# Patient Record
Sex: MALE | Race: WHITE | NOT HISPANIC OR LATINO | Employment: FULL TIME | URBAN - METROPOLITAN AREA
[De-identification: names, ages, dates, MRNs, and addresses within clinical notes are randomized per-mention and may not be internally consistent; named-entity substitution may affect disease eponyms.]

---

## 2017-01-16 ENCOUNTER — GENERIC CONVERSION - ENCOUNTER (OUTPATIENT)
Dept: OTHER | Facility: OTHER | Age: 52
End: 2017-01-16

## 2017-02-02 ENCOUNTER — GENERIC CONVERSION - ENCOUNTER (OUTPATIENT)
Dept: OTHER | Facility: OTHER | Age: 52
End: 2017-02-02

## 2017-02-21 ENCOUNTER — TRANSCRIBE ORDERS (OUTPATIENT)
Dept: ADMINISTRATIVE | Facility: HOSPITAL | Age: 52
End: 2017-02-21

## 2017-02-21 ENCOUNTER — HOSPITAL ENCOUNTER (OUTPATIENT)
Dept: NON INVASIVE DIAGNOSTICS | Facility: HOSPITAL | Age: 52
Discharge: HOME | End: 2017-02-21
Payer: COMMERCIAL

## 2017-02-21 ENCOUNTER — APPOINTMENT (OUTPATIENT)
Dept: LAB | Facility: HOSPITAL | Age: 52
End: 2017-02-21
Payer: COMMERCIAL

## 2017-02-21 DIAGNOSIS — M17.12 PRIMARY OSTEOARTHRITIS OF LEFT KNEE: ICD-10-CM

## 2017-02-21 DIAGNOSIS — M25.561 RIGHT KNEE PAIN, UNSPECIFIED CHRONICITY: ICD-10-CM

## 2017-02-21 DIAGNOSIS — S83.232A COMPLEX TEAR OF MEDIAL MENISCUS OF LEFT KNEE AS CURRENT INJURY, INITIAL ENCOUNTER: Primary | ICD-10-CM

## 2017-02-21 DIAGNOSIS — S83.232A COMPLEX TEAR OF MEDIAL MENISCUS OF LEFT KNEE AS CURRENT INJURY, INITIAL ENCOUNTER: ICD-10-CM

## 2017-02-21 LAB
ANION GAP SERPL CALCULATED.3IONS-SCNC: 11 MMOL/L (ref 4–13)
ATRIAL RATE: 70 BPM
BASOPHILS # BLD AUTO: 0.08 THOUSANDS/ΜL (ref 0–0.1)
BASOPHILS NFR BLD AUTO: 1 % (ref 0–1)
BUN SERPL-MCNC: 17 MG/DL (ref 5–25)
CALCIUM SERPL-MCNC: 9.7 MG/DL (ref 8.3–10.1)
CHLORIDE SERPL-SCNC: 102 MMOL/L (ref 100–108)
CO2 SERPL-SCNC: 27 MMOL/L (ref 21–32)
CREAT SERPL-MCNC: 0.92 MG/DL (ref 0.6–1.3)
EOSINOPHIL # BLD AUTO: 0.26 THOUSAND/ΜL (ref 0–0.61)
EOSINOPHIL NFR BLD AUTO: 3 % (ref 0–6)
ERYTHROCYTE [DISTWIDTH] IN BLOOD BY AUTOMATED COUNT: 13.9 % (ref 11.6–15.1)
GFR SERPL CREATININE-BSD FRML MDRD: >60 ML/MIN/1.73SQ M
GLUCOSE SERPL-MCNC: 113 MG/DL (ref 65–140)
HCT VFR BLD AUTO: 48.8 % (ref 36.5–49.3)
HGB BLD-MCNC: 16.7 G/DL (ref 12–17)
LYMPHOCYTES # BLD AUTO: 1.64 THOUSANDS/ΜL (ref 0.6–4.47)
LYMPHOCYTES NFR BLD AUTO: 21 % (ref 14–44)
MCH RBC QN AUTO: 31.3 PG (ref 26.8–34.3)
MCHC RBC AUTO-ENTMCNC: 34.2 G/DL (ref 31.4–37.4)
MCV RBC AUTO: 92 FL (ref 82–98)
MONOCYTES # BLD AUTO: 0.91 THOUSAND/ΜL (ref 0.17–1.22)
MONOCYTES NFR BLD AUTO: 12 % (ref 4–12)
NEUTROPHILS # BLD AUTO: 4.93 THOUSANDS/ΜL (ref 1.85–7.62)
NEUTS SEG NFR BLD AUTO: 63 % (ref 43–75)
P AXIS: 39 DEGREES
PLATELET # BLD AUTO: 233 THOUSANDS/UL (ref 149–390)
PMV BLD AUTO: 11.4 FL (ref 8.9–12.7)
POTASSIUM SERPL-SCNC: 4.5 MMOL/L (ref 3.5–5.3)
PR INTERVAL: 170 MS
QRS AXIS: 6 DEGREES
QRSD INTERVAL: 80 MS
QT INTERVAL: 394 MS
QTC INTERVAL: 425 MS
RBC # BLD AUTO: 5.33 MILLION/UL (ref 3.88–5.62)
SODIUM SERPL-SCNC: 140 MMOL/L (ref 136–145)
T WAVE AXIS: -7 DEGREES
VENTRICULAR RATE: 70 BPM
WBC # BLD AUTO: 7.82 THOUSAND/UL (ref 4.31–10.16)

## 2017-02-21 PROCEDURE — 80048 BASIC METABOLIC PNL TOTAL CA: CPT

## 2017-02-21 PROCEDURE — 93005 ELECTROCARDIOGRAM TRACING: CPT

## 2017-02-21 PROCEDURE — 85025 COMPLETE CBC W/AUTO DIFF WBC: CPT

## 2017-02-21 PROCEDURE — 36415 COLL VENOUS BLD VENIPUNCTURE: CPT

## 2017-02-28 ENCOUNTER — ALLSCRIPTS OFFICE VISIT (OUTPATIENT)
Dept: OTHER | Facility: OTHER | Age: 52
End: 2017-02-28

## 2017-03-09 ENCOUNTER — GENERIC CONVERSION - ENCOUNTER (OUTPATIENT)
Dept: OTHER | Facility: OTHER | Age: 52
End: 2017-03-09

## 2017-04-18 ENCOUNTER — GENERIC CONVERSION - ENCOUNTER (OUTPATIENT)
Dept: OTHER | Facility: OTHER | Age: 52
End: 2017-04-18

## 2017-05-01 ENCOUNTER — GENERIC CONVERSION - ENCOUNTER (OUTPATIENT)
Dept: OTHER | Facility: OTHER | Age: 52
End: 2017-05-01

## 2017-07-01 ENCOUNTER — OFFICE VISIT (OUTPATIENT)
Dept: URGENT CARE | Facility: CLINIC | Age: 52
End: 2017-07-01
Payer: COMMERCIAL

## 2017-07-01 PROCEDURE — 99213 OFFICE O/P EST LOW 20 MIN: CPT

## 2017-07-07 ENCOUNTER — ALLSCRIPTS OFFICE VISIT (OUTPATIENT)
Dept: OTHER | Facility: OTHER | Age: 52
End: 2017-07-07

## 2017-09-13 ENCOUNTER — GENERIC CONVERSION - ENCOUNTER (OUTPATIENT)
Dept: OTHER | Facility: OTHER | Age: 52
End: 2017-09-13

## 2017-09-14 ENCOUNTER — GENERIC CONVERSION - ENCOUNTER (OUTPATIENT)
Dept: OTHER | Facility: OTHER | Age: 52
End: 2017-09-14

## 2017-10-09 ENCOUNTER — APPOINTMENT (OUTPATIENT)
Dept: PHYSICAL THERAPY | Facility: CLINIC | Age: 52
End: 2017-10-09
Payer: COMMERCIAL

## 2017-10-09 PROCEDURE — G8990 OTHER PT/OT CURRENT STATUS: HCPCS

## 2017-10-09 PROCEDURE — 97162 PT EVAL MOD COMPLEX 30 MIN: CPT

## 2017-10-09 PROCEDURE — G8991 OTHER PT/OT GOAL STATUS: HCPCS

## 2017-10-09 PROCEDURE — 97140 MANUAL THERAPY 1/> REGIONS: CPT

## 2017-10-09 PROCEDURE — 97010 HOT OR COLD PACKS THERAPY: CPT

## 2017-10-09 PROCEDURE — 97110 THERAPEUTIC EXERCISES: CPT

## 2017-10-11 ENCOUNTER — APPOINTMENT (OUTPATIENT)
Dept: PHYSICAL THERAPY | Facility: CLINIC | Age: 52
End: 2017-10-11
Payer: COMMERCIAL

## 2017-10-11 PROCEDURE — 97140 MANUAL THERAPY 1/> REGIONS: CPT

## 2017-10-11 PROCEDURE — 97110 THERAPEUTIC EXERCISES: CPT

## 2017-10-12 ENCOUNTER — APPOINTMENT (OUTPATIENT)
Dept: PHYSICAL THERAPY | Facility: CLINIC | Age: 52
End: 2017-10-12
Payer: COMMERCIAL

## 2017-10-12 PROCEDURE — 97140 MANUAL THERAPY 1/> REGIONS: CPT

## 2017-10-12 PROCEDURE — 97110 THERAPEUTIC EXERCISES: CPT

## 2017-10-13 ENCOUNTER — APPOINTMENT (OUTPATIENT)
Dept: PHYSICAL THERAPY | Facility: CLINIC | Age: 52
End: 2017-10-13
Payer: COMMERCIAL

## 2017-10-16 ENCOUNTER — APPOINTMENT (OUTPATIENT)
Dept: PHYSICAL THERAPY | Facility: CLINIC | Age: 52
End: 2017-10-16
Payer: COMMERCIAL

## 2017-10-16 PROCEDURE — 97110 THERAPEUTIC EXERCISES: CPT

## 2017-10-16 PROCEDURE — 97140 MANUAL THERAPY 1/> REGIONS: CPT

## 2017-10-18 ENCOUNTER — APPOINTMENT (OUTPATIENT)
Dept: PHYSICAL THERAPY | Facility: CLINIC | Age: 52
End: 2017-10-18
Payer: COMMERCIAL

## 2017-10-18 PROCEDURE — 97140 MANUAL THERAPY 1/> REGIONS: CPT

## 2017-10-18 PROCEDURE — 97110 THERAPEUTIC EXERCISES: CPT

## 2017-10-20 ENCOUNTER — APPOINTMENT (OUTPATIENT)
Dept: PHYSICAL THERAPY | Facility: CLINIC | Age: 52
End: 2017-10-20
Payer: COMMERCIAL

## 2017-10-20 PROCEDURE — 97110 THERAPEUTIC EXERCISES: CPT

## 2017-10-30 ENCOUNTER — APPOINTMENT (OUTPATIENT)
Dept: PHYSICAL THERAPY | Facility: CLINIC | Age: 52
End: 2017-10-30
Payer: COMMERCIAL

## 2017-10-30 PROCEDURE — 97140 MANUAL THERAPY 1/> REGIONS: CPT

## 2017-10-30 PROCEDURE — 97110 THERAPEUTIC EXERCISES: CPT

## 2017-11-01 ENCOUNTER — APPOINTMENT (OUTPATIENT)
Dept: PHYSICAL THERAPY | Facility: CLINIC | Age: 52
End: 2017-11-01
Payer: COMMERCIAL

## 2017-11-03 ENCOUNTER — GENERIC CONVERSION - ENCOUNTER (OUTPATIENT)
Dept: OTHER | Facility: OTHER | Age: 52
End: 2017-11-03

## 2017-11-03 ENCOUNTER — APPOINTMENT (OUTPATIENT)
Dept: PHYSICAL THERAPY | Facility: CLINIC | Age: 52
End: 2017-11-03
Payer: COMMERCIAL

## 2017-11-03 PROCEDURE — 97110 THERAPEUTIC EXERCISES: CPT

## 2017-11-22 ENCOUNTER — ALLSCRIPTS OFFICE VISIT (OUTPATIENT)
Dept: OTHER | Facility: OTHER | Age: 52
End: 2017-11-22

## 2017-11-25 NOTE — PROGRESS NOTES
Assessment    1  Acute upper respiratory infection (465 9) (J06 9)   2  Headache (784 0) (R51)    Plan  Acute upper respiratory infection    · Amoxicillin 500 MG Oral Capsule; TAKE 1 CAPSULE 3 TIMES DAILY  Colon cancer screening    · COLONOSCOPY; Status:Temporary Deferral - Pt requests deferral;    11/22/2018  Headache    · SUMAtriptan Succinate 50 MG Oral Tablet; TAKE 1 TABLET FOR MIGRAINERELIEF  MAY REPEAT EVERY 2 HOURS  MAX 200MG/DAY  Increased BMI    · We recommend that you bring your body mass index down to 26 ; Status:Complete;  Done: [id number]  Need for immunization against influenza    · Fluzone Quadrivalent Intramuscular Suspension    Discussion/Summary  The patient was counseled regarding instructions for management,-- risk factor reductions,-- prognosis,-- patient and family education,-- risks and benefits of treatment options,-- importance of compliance with treatment  Possible side effects of new medications were reviewed with the patient/guardian today  The treatment plan was reviewed with the patient/guardian  The patient/guardian understands and agrees with the treatment plan      Chief Complaint    1  Cold Symptoms  Luann Rashid is here today with cold symptoms x 1 week  He also has noticed an increase in his migraine headaches  He used to take OTC Excedrin migraine, but it doesn't offer him the same relief as it used to  He is asking for an Rx for sumatriptan  History of Present Illness  HPI: See chief compliant  Review of Systems   Constitutional: no fever-- and-- no chills  ENT: sore throat,-- nasal discharge-- and-- hoarseness, but-- no earache  Cardiovascular: no chest pain-- and-- no palpitations  Respiratory: cough-- and-- PND  Gastrointestinal: no abdominal pain,-- no constipation-- and-- no diarrhea  Neurological: headache, but-- as noted in HPI  ROS reviewed  Active Problems  1  Colon cancer screening (V76 51) (Z12 11)   2  Hyperlipidemia (272 4) (E78 5)   3   Increased BMI (783 9) (R63 8)   4  Need for immunization against influenza (V04 81) (Z23)    Past Medical History    1  History of Abdominal pain, epigastric (789 06) (R10 13)   2  History of Abdominal pain, left upper quadrant (789 02) (R10 12)   3  History of Bee allergy status (V15 06) (Z91 030)   4  History of Bee Sting (E905 3)   5  History of Benign lipomatous neoplasm (229 9) (D17 9)   6  History of Cellulitis of mid back region (682 2) (L03 312)   7  History of Common cold (460) (J00)   8  History of Encounter for general adult medical examination without abnormal findings (V70 9) (Z00 00)   9  History of Hand pain, unspecified laterality   10  History of High risk medication use (V58 69) (Z79 899)   11  History of Hip pain, unspecified laterality   12  History of acute sinusitis (V12 69) (Z87 09)   13  History of low back pain (V13 59) (Z87 39)   14  History of sebaceous cyst (V13 3) (Z87 2)   15  History of Internal derangement of left knee (717 9) (M23 92)   16  History of Nerve pain (729 2) (M79 2)   17  History of Pain in wrist, unspecified laterality (719 43) (M25 539)   18  History of Pelvic cramping (625 9) (R10 2)   19  History of Prostatism (600 90) (N40 0)   20  History of Screening for colon cancer (V76 51) (Z12 11)   21  History of Screening for depression (V79 0) (Z13 89)   22  History of Sinus pain (478 19) (J34 89)   23  History of Sinusitis (473 9) (J32 9)   24  Wrist Pain Elicited By Motion  Active Problems And Past Medical History Reviewed: The active problems and past medical history were reviewed and updated today  Family History  Mother    1  Family history of asthma (V17 5) (Z82 5)  Maternal Grandfather    2  Family history of Type 2 diabetes mellitus (250 00) (E11 9)  Family History Reviewed: The family history was reviewed and updated today  Social History   · Being A Social Drinker   · Never a smoker   · No living will  The social history was reviewed and updated today   The social history was reviewed and is unchanged  Surgical History    1  History of Arthroscopy Elbow   2  History of Arthroscopy Knee Right   3  History of Arthroscopy Shoulder Left   4  History of Arthroscopy Shoulder Right  Surgical History Reviewed: The surgical history was reviewed and updated today  Current Meds   1  EpiPen 2-Compa 0 3 MG/0 3ML Injection Solution Auto-injector; USE AS DIRECTED; Therapy: [id number] to (21 [phone])  Requested for: 86MMM4011; Last Rx:20Oct2017 Ordered   2  Multivitamins TABS; Take 1 tablet daily Recorded    The medication list was reviewed and updated today  Allergies  1  Iodinated Contrast Media  2  Bee sting    Vitals   Recorded: 22Nov2017 02:32PM   Temperature 51 9 F   Systolic 330, LUE, Sitting   Diastolic 84, LUE, Sitting   Height 5 ft 10 in   Weight 269 lb 4 0 oz   BMI Calculated 38 63   BSA Calculated 2 37       Physical Exam   Constitutional  General appearance: No acute distress, well appearing and well nourished  Ears, Nose, Mouth, and Throat  External inspection of ears and nose: Normal    Otoscopic examination: Tympanic membrance translucent with normal light reflex  Canals patent without erythema  Oropharynx: Abnormal   The posterior pharynx was erythematous, but-- did not have an exudate  Pulmonary  Respiratory effort: No increased work of breathing or signs of respiratory distress  Auscultation of lungs: Abnormal  -- coarse BS  Cardiovascular  Auscultation of heart: Normal rate and rhythm, normal S1 and S2, without murmurs           Signatures   Electronically signed by : Gavino Connolly, [facility]; Nov 22 2017  2:51PM EST                       (Author)    Electronically signed by : Ar Shoemaker DO; Nov 24 2017  6:44AM EST                       (Author)

## 2018-01-10 NOTE — RESULT NOTES
Verified Results  * CT ABDOMEN PELVIS W CONTRAST [id number] 10:54AM Katelynn Lux   TW Order Number: [id number]    - Patient Instructions: To schedule this appointment, please contact Central Scheduling at [phone]  TW Order Number: [id number]    - Patient Instructions: To schedule this appointment, please contact Central Scheduling at [phone]  Test Name Result Flag Reference   CT ABDOMEN PELVIS W CONTRAST (Report)     CT ABDOMEN AND PELVIS WITH IV CONTRAST     INDICATION: Enlarged prostate  Suprapubic pain x2 months with disruption while urinating  Microhematuria  Status post umbilical herniorrhaphy and Nissen fundoplication  COMPARISON: April 6, 2012     TECHNIQUE: CT examination of the abdomen and pelvis was performed after the administration of intravenous contrast  This examination, like all CT scans performed in the [facility], was performed utilizing techniques to minimize    radiation dose exposure, including the use of iterative reconstruction and automated exposure control  Axial, sagittal, and coronal reformatted images were submitted for interpretation  100 cc of intravenous Omnipaque 350 was administered for this    examination  Enteric contrast was not given  FINDINGS:     ABDOMEN     LOWER CHEST: No significant abnormalities identified in the lower chest      LIVER/BILIARY TREE: The liver is mildly enlarged measuring 17 cm in height, moderately and diffusely diminished in attenuation without focal masses or dilated biliary ducts  GALLBLADDER: No calcified gallstones  No pericholecystic inflammatory change  SPLEEN: Normal in size and contour  Small accessory splenules noted in the stomach inferior splenic hilum  PANCREAS: Unremarkable  ADRENAL GLANDS: Unremarkable  KIDNEYS/URETERS: Unremarkable  No hydronephrosis  STOMACH AND BOWEL: Unremarkable  APPENDIX: No findings to suggest appendicitis       ABDOMINOPELVIC CAVITY: No ascites or free intraperitoneal air  No lymphadenopathy  VESSELS: Unremarkable for patient's age  PELVIS     REPRODUCTIVE ORGANS: The prostate measures 4 7 x 4 6 x 3 0 cm  URINARY BLADDER: Unremarkable  ABDOMINAL WALL/INGUINAL REGIONS: Fat is noted in the umbilical hernia with numerous periumbilical anterior abdominal wall surgical clips, similar in position to prior study  OSSEOUS STRUCTURES: Old left S1 laminectomy  IMPRESSION:     Mild hepatomegaly and moderate hepatic steatosis  Stable fat-containing umbilical hernia status post abdominoplasty  Mildly enlarged prostate gland  Workstation performed: [id number]     Signed by:   Claribel Cueto MD   9/7/16     (1) CBC/PLT/DIFF [id number] 07:56AM Lisandra Neither   TW Order Number: [id number]     Test Name Result Flag Reference   WBC COUNT 6 43 Thousand/uL  4 31-10 16   RBC COUNT 5 16 Million/uL  3 88-5 62   HEMOGLOBIN 16 4 g/dL  12 0-17 0   HEMATOCRIT 47 5 %  36 5-49 3   MCV 92 fL  82-98   MCH 31 8 pg  26 8-34 3   MCHC 34 5 g/dL  31 4-37 4   RDW 13 9 %  11 6-15 1   MPV 12 6 fL  8 9-12 7   PLATELET COUNT 136 Thousands/uL  149-390   nRBC AUTOMATED 0 /100 WBCs     NEUTROPHILS RELATIVE PERCENT 61 %  43-75   LYMPHOCYTES RELATIVE PERCENT 28 %  14-44   MONOCYTES RELATIVE PERCENT 8 %  4-12   EOSINOPHILS RELATIVE PERCENT 2 %  0-6   BASOPHILS RELATIVE PERCENT 1 %  0-1   NEUTROPHILS ABSOLUTE COUNT 3 94 Thousands/?L  1 85-7 62   LYMPHOCYTES ABSOLUTE COUNT 1 77 Thousands/?L  0 60-4 47   MONOCYTES ABSOLUTE COUNT 0 52 Thousand/?L  0 17-1 22   EOSINOPHILS ABSOLUTE COUNT 0 13 Thousand/?L  0 00-0 61   BASOPHILS ABSOLUTE COUNT 0 06 Thousands/?L  0 00-0 10   - Patient Instructions: This bloodwork is non-fasting  Please drink two glasses of water morning of bloodwork  - Patient Instructions: This bloodwork is non-fasting  Please drink two glasses of water morning of bloodwork       (1) URINALYSIS w URINE C/S REFLEX (will reflex a microscopy if leukocytes, occult blood, or nitrites are not within normal limits) [id number] 07:56AM Darold Blizzard Order Number: [id number]     Test Name Result Flag Reference   COLOR Yellow     CLARITY Clear     PH UA 5 5  4 5-8 0   LEUKOCYTE ESTERASE UA Negative  Negative   NITRITE UA Negative  Negative   PROTEIN UA Negative mg/dl  Negative   GLUCOSE  (1/10%) mg/dl A Negative   KETONES UA Negative mg/dl  Negative   UROBILINOGEN UA 0 2 E U /dl  0 2, 1 0 E U /dl   BILIRUBIN UA Negative  Negative   BLOOD UA Negative  Negative   SPECIFIC GRAVITY UA 1 029  1 003-1 030     (1) PSA FREE & TOTAL 06Sep2016 07:56AM Michelle VALENZUELA Order Number: [id number]     Test Name Result Flag Reference   PSA, FREE 0 12 ng/mL  N/A   Roche ECLIA methodology  % FREE PSA 15 0 %     The table below lists the probability of prostate cancer for  men with non-suspicious CADENCE results and total PSA between  4 and 10 ng/mL, by patient age Tremaine Luna, [address] [place] [address],  [id number])  % Free PSA       50-64 yr        65-75 yr                    0 00-10 00%        56%             55%                   10 01-15 00%        24%             35%                   15 01-20 00%        17%             23%                   20 01-25 00%        10%             20%                        >25 00%         5%              9%  Please note:  Son et al did not make specific                recommendations regarding the use of                percent free PSA for any other population                of men  Performed at:  [address] [place] [address]  [id number]  : Edda Lr MD, Phone:  [phone]   PROSTATE SPECIFIC ANTIGEN TOTAL 0 8 ng/mL  0 0 - 4 0   Roche ECLIA methodology  According to the American Urological Association, Serum PSA should  decrease and remain at undetectable levels after radical  prostatectomy   The AUA defines biochemical recurrence as an initial  PSA value 0 2 ng/mL or greater followed by a subsequent confirmatory  PSA value 0 2 ng/mL or greater  Values obtained with different assay methods or kits cannot be used  interchangeably  Results cannot be interpreted as absolute evidence  of the presence or absence of malignant disease  (1) COMPREHENSIVE METABOLIC PANEL [id number] 80:27VT Arnaldo Crook    Order Number: [id number]     Test Name Result Flag Reference   GLUCOSE,RANDM 124 mg/dL     If the patient is fasting, the ADA then defines impaired fasting glucose as > 100 mg/dL and diabetes as > or equal to 123 mg/dL  SODIUM 140 mmol/L  136-145   POTASSIUM 4 0 mmol/L  3 5-5 3   CHLORIDE 107 mmol/L  100-108   CARBON DIOXIDE 24 mmol/L  21-32   ANION GAP (CALC) 9 mmol/L  4-13   BLOOD UREA NITROGEN 20 mg/dL  5-25   CREATININE 1 03 mg/dL  0 60-1 30   Standardized to IDMS reference method   CALCIUM 9 6 mg/dL  8 3-10 1   BILI, TOTAL 0 49 mg/dL  0 20-1 00   ALK PHOSPHATAS 92 U/L     ALT (SGPT) 36 U/L  12-78   AST(SGOT) 20 U/L  5-45   ALBUMIN 3 8 g/dL  3 5-5 0   TOTAL PROTEIN 7 1 g/dL  6 4-8 2   eGFR Non-African American      >60 0 ml/min/1 73sq m   [facility] Disease Education Program recommendations are as follows:  GFR calculation is accurate only with a steady state creatinine  Chronic Kidney disease less than 60 ml/min/1 73 sq  meters  Kidney failure less than 15 ml/min/1 73 sq  meters

## 2018-01-10 NOTE — RESULT NOTES
Verified Results  (1) LIPID PANEL, FASTING [id number] 07:52AM Arnaldo Crook    Order Number: [id number]  TW Order Number: [id number]     Test Name Result Flag Reference   CHOLESTEROL 206 mg/dL H    HDL,DIRECT 48 mg/dL  40-60   Specimen collection should occur prior to Metamizole administration due to the potential for falsely depressed results  LDL CHOLESTEROL CALCULATED 130 mg/dL H 0-100   - Patient Instructions: This is a fasting blood test  Water,black tea or black  coffee only after 9:00pm the night before test   Drink 2 glasses of water the morning of test    - Patient Instructions: This is a fasting blood test  Water,black tea or black  coffee only after 9:00pm the night before test   Drink 2 glasses of water the morning of test     - Patient Instructions: This is a fasting blood test  Water,black tea or black  coffee only after 9:00pm the night before test Drink 2 glasses of water the morning of test - Patient Instructions: This is a fasting blood test  Water,black tea or black    coffee only after 9:00pm the night before test Drink 2 glasses of water the morning of test   Triglyceride:         Normal              <150 mg/dl       Borderline High    150-199 mg/dl       High               200-499 mg/dl       Very High          >499 mg/dl  Cholesterol:         Desirable        <200 mg/dl      Borderline High  200-239 mg/dl      High             >239 mg/dl  HDL Cholesterol:        High    >59 mg/dL      Low     <41 mg/dL  LDL CALCULATED:    This screening LDL is a calculated result  It does not have the accuracy of the Direct Measured LDL in the monitoring of patients with hyperlipidemia and/or statin therapy  Direct Measure LDL (QRX046) must be ordered separately in these patients  TRIGLYCERIDES 142 mg/dL  <=150   Specimen collection should occur prior to N-Acetylcysteine or Metamizole administration due to the potential for falsely depressed results       (1) AST (SGOT) [id number] 07:52AM Rekha Woodson     Test Name Result Flag Reference   AST(SGOT) 22 U/L  5-45   - Patient Instructions: This is a fasting blood test  Water,black tea or black  coffee only after 9:00pm the night before test Drink 2 glasses of water the morning of test - Patient Instructions: This is a fasting blood test  Water,black tea or black    coffee only after 9:00pm the night before test Drink 2 glasses of water the morning of test      (1) ALT (SGPT) [id number] 07:52AM Rekha Woodson     Test Name Result Flag Reference   ALT (SGPT) 34 U/L  12-78   - Patient Instructions: This is a fasting blood test  Water,black tea or black  coffee only after 9:00pm the night before test Drink 2 glasses of water the morning of test - Patient Instructions:  This is a fasting blood test  Water,black tea or black    coffee only after 9:00pm the night before test Drink 2 glasses of water the morning of test

## 2018-01-12 VITALS
WEIGHT: 268 LBS | SYSTOLIC BLOOD PRESSURE: 100 MMHG | HEIGHT: 70 IN | DIASTOLIC BLOOD PRESSURE: 76 MMHG | BODY MASS INDEX: 38.37 KG/M2

## 2018-01-13 VITALS
HEIGHT: 70 IN | SYSTOLIC BLOOD PRESSURE: 132 MMHG | DIASTOLIC BLOOD PRESSURE: 88 MMHG | WEIGHT: 276 LBS | TEMPERATURE: 97.1 F | HEART RATE: 88 BPM | BODY MASS INDEX: 39.51 KG/M2

## 2018-01-14 VITALS
BODY MASS INDEX: 38.55 KG/M2 | HEIGHT: 70 IN | WEIGHT: 269.25 LBS | TEMPERATURE: 96.8 F | DIASTOLIC BLOOD PRESSURE: 84 MMHG | SYSTOLIC BLOOD PRESSURE: 124 MMHG

## 2018-01-14 NOTE — MISCELLANEOUS
Message  Return to work or school:   Brodie Chavis is under my professional care  He was seen in my office on 11/22/2017       Patient was seen in my office today          Signatures   Electronically signed by : Mena Schwab, [facility]; Nov 22 2017  2:53PM EST                       (Author)

## 2018-01-17 NOTE — MISCELLANEOUS
Message  Return to work or school:        Please excuse from work on 5/11/2016 and 5/12/2016 for gastroenteritis          Signatures   Electronically signed by : Jeni Cervantes DO; May 12 2016  5:09PM EST                       (Author)

## 2018-02-28 ENCOUNTER — TRANSCRIBE ORDERS (OUTPATIENT)
Dept: ADMINISTRATIVE | Facility: HOSPITAL | Age: 53
End: 2018-02-28

## 2018-02-28 ENCOUNTER — HOSPITAL ENCOUNTER (OUTPATIENT)
Dept: NON INVASIVE DIAGNOSTICS | Facility: HOSPITAL | Age: 53
Discharge: HOME | End: 2018-02-28
Payer: OTHER MISCELLANEOUS

## 2018-02-28 DIAGNOSIS — Z01.812 PRE-OPERATIVE LABORATORY EXAMINATION: Primary | ICD-10-CM

## 2018-02-28 DIAGNOSIS — Z01.812 PRE-OPERATIVE LABORATORY EXAMINATION: ICD-10-CM

## 2018-02-28 PROCEDURE — 93005 ELECTROCARDIOGRAM TRACING: CPT

## 2018-03-01 LAB
ATRIAL RATE: 70 BPM
P AXIS: 24 DEGREES
PR INTERVAL: 166 MS
QRS AXIS: -4 DEGREES
QRSD INTERVAL: 82 MS
QT INTERVAL: 384 MS
QTC INTERVAL: 414 MS
T WAVE AXIS: 0 DEGREES
VENTRICULAR RATE: 70 BPM

## 2018-03-01 PROCEDURE — 93010 ELECTROCARDIOGRAM REPORT: CPT | Performed by: INTERNAL MEDICINE

## 2018-03-06 ENCOUNTER — OFFICE VISIT (OUTPATIENT)
Dept: FAMILY MEDICINE CLINIC | Facility: CLINIC | Age: 53
End: 2018-03-06
Payer: OTHER MISCELLANEOUS

## 2018-03-06 VITALS
OXYGEN SATURATION: 98 % | WEIGHT: 278.8 LBS | DIASTOLIC BLOOD PRESSURE: 88 MMHG | TEMPERATURE: 97.7 F | HEART RATE: 88 BPM | HEIGHT: 70 IN | SYSTOLIC BLOOD PRESSURE: 132 MMHG | BODY MASS INDEX: 39.91 KG/M2 | RESPIRATION RATE: 16 BRPM

## 2018-03-06 DIAGNOSIS — J20.9 ACUTE TRACHEOBRONCHITIS: Primary | ICD-10-CM

## 2018-03-06 PROCEDURE — 99213 OFFICE O/P EST LOW 20 MIN: CPT | Performed by: FAMILY MEDICINE

## 2018-03-06 RX ORDER — MULTIVIT-MINERALS/FA/LYCOPENE 0.4 MG-6
1 TABLET ORAL DAILY
COMMUNITY

## 2018-03-06 RX ORDER — AMOXICILLIN AND CLAVULANATE POTASSIUM 875; 125 MG/1; MG/1
1 TABLET, FILM COATED ORAL EVERY 12 HOURS SCHEDULED
Qty: 20 TABLET | Refills: 0 | Status: SHIPPED | OUTPATIENT
Start: 2018-03-06 | End: 2018-03-16

## 2018-03-06 RX ORDER — SUMATRIPTAN 50 MG/1
1 TABLET, FILM COATED ORAL
COMMUNITY
Start: 2017-11-22 | End: 2018-06-13 | Stop reason: SDUPTHER

## 2018-03-06 RX ORDER — OSELTAMIVIR PHOSPHATE 75 MG/1
75 CAPSULE ORAL EVERY 12 HOURS SCHEDULED
Qty: 10 CAPSULE | Refills: 0 | Status: SHIPPED | OUTPATIENT
Start: 2018-03-06 | End: 2018-03-11

## 2018-03-06 RX ORDER — EPINEPHRINE 0.3 MG/.3ML
INJECTION SUBCUTANEOUS 2 TIMES DAILY
COMMUNITY
Start: 2013-04-09 | End: 2020-07-08 | Stop reason: SDUPTHER

## 2018-03-06 NOTE — PROGRESS NOTES
Assessment/Plan:    No problem-specific Assessment & Plan notes found for this encounter  Diagnoses and all orders for this visit:    Acute tracheobronchitis    Other orders  -     EPINEPHrine (EPIPEN 2-HONAG) 0 3 mg/0 3 mL SOAJ; Inject as directed Twice daily  -     Multiple Vitamins-Minerals (PX MENS MULTIVITAMINS) TABS; Take 1 tablet by mouth daily  -     SUMAtriptan (IMITREX) 50 mg tablet; Take 1 tablet by mouth          Subjective:      Patient ID: Leeann Forrest  is a 46 y o  male  Patient supposed to have knee replacement surgery on Friday and he is sick and has upper respiratory and lower respiratory symptoms he had some wheezing he has a very gravelly voice is family have all come down with a respiratory symptoms I think he is in the on there early stages of this        The following portions of the patient's history were reviewed and updated as appropriate:   He  has a past medical history of Bee allergy status; Benign lipomatous neoplasm; Cellulitis of mid back region; High risk medication use; Internal derangement of knee, left; Pelvic cramping; Prostatism; and Sebaceous cyst   He There are no active problems to display for this patient  He  has a past surgical history that includes ARTHROSCOPY ELBOW (Left, 2003); ARTHROSCOPY KNEE (Right); Shoulder arthroscopy (Left, 2003); Shoulder arthroscopy (Right, 04/2012); TONSILLECTOMY; Wrist surgery (Left); Knee surgery; and Hernia repair  His family history includes Asthma in his mother; Diabetes type II in his maternal grandfather  He  reports that he has never smoked  He quit smokeless tobacco use about 8 years ago  He reports that he drinks alcohol  He reports that he does not use drugs    Current Outpatient Prescriptions   Medication Sig Dispense Refill    EPINEPHrine (EPIPEN 2-HOANG) 0 3 mg/0 3 mL SOAJ Inject as directed Twice daily      Multiple Vitamins-Minerals (PX MENS MULTIVITAMINS) TABS Take 1 tablet by mouth daily      SUMAtriptan (IMITREX) 50 mg tablet Take 1 tablet by mouth       No current facility-administered medications for this visit  No current outpatient prescriptions on file prior to visit  No current facility-administered medications on file prior to visit  He is allergic to bee venom and contrast  [iodinated diagnostic agents]       Review of Systems   Constitutional: Negative for activity change, appetite change, diaphoresis, fatigue and fever  HENT: Negative  Eyes: Negative  Respiratory: Positive for cough, shortness of breath and wheezing  Negative for apnea and chest tightness  Cardiovascular: Negative for chest pain, palpitations and leg swelling  Gastrointestinal: Negative for abdominal distention, abdominal pain, anal bleeding, constipation, diarrhea, nausea and vomiting  Endocrine: Negative for cold intolerance, heat intolerance, polydipsia, polyphagia and polyuria  Genitourinary: Negative for difficulty urinating, dysuria, flank pain, hematuria and urgency  Musculoskeletal: Negative for arthralgias, back pain, gait problem, joint swelling and myalgias  Skin: Negative for color change, rash and wound  Allergic/Immunologic: Negative for environmental allergies, food allergies and immunocompromised state  Neurological: Negative for dizziness, seizures, syncope, speech difficulty, numbness and headaches  Hematological: Negative for adenopathy  Does not bruise/bleed easily  Psychiatric/Behavioral: Negative for agitation, behavioral problems, hallucinations, sleep disturbance and suicidal ideas  Objective:      /88 (BP Location: Left arm, Patient Position: Sitting, Cuff Size: Standard)   Pulse 88   Temp 97 7 °F (36 5 °C)   Resp 16   Ht 5' 10" (1 778 m)   Wt 126 kg (278 lb 12 8 oz)   SpO2 98%   BMI 40 00 kg/m²          Physical Exam   Constitutional: He is oriented to person, place, and time  He appears well-developed and well-nourished  No distress     HENT:   Head: Normocephalic  Right Ear: External ear normal    Left Ear: External ear normal    Mouth/Throat: Oropharyngeal exudate present  Nasal passages red hyperemic throat is getting red   Eyes: Conjunctivae and EOM are normal  Pupils are equal, round, and reactive to light  Right eye exhibits no discharge  Left eye exhibits no discharge  No scleral icterus  Neck: Normal range of motion  No tracheal deviation present  No thyromegaly present  Cardiovascular: Normal rate, regular rhythm and normal heart sounds  Exam reveals no gallop and no friction rub  No murmur heard  Pulmonary/Chest: Effort normal  No respiratory distress  He has no wheezes  Course bronchial breath sounds   Abdominal: Soft  Bowel sounds are normal  He exhibits no mass  There is no tenderness  There is no guarding  Musculoskeletal: He exhibits no edema or deformity  Lymphadenopathy:     He has no cervical adenopathy  Neurological: He is alert and oriented to person, place, and time  No cranial nerve deficit  Skin: Skin is warm and dry  No rash noted  He is not diaphoretic  No erythema  Psychiatric: He has a normal mood and affect   Thought content normal

## 2018-03-08 ENCOUNTER — TELEPHONE (OUTPATIENT)
Dept: FAMILY MEDICINE CLINIC | Facility: CLINIC | Age: 53
End: 2018-03-08

## 2018-03-08 ENCOUNTER — DOCUMENTATION (OUTPATIENT)
Dept: FAMILY MEDICINE CLINIC | Facility: CLINIC | Age: 53
End: 2018-03-08

## 2018-04-23 ENCOUNTER — TRANSCRIBE ORDERS (OUTPATIENT)
Dept: PHYSICAL THERAPY | Facility: CLINIC | Age: 53
End: 2018-04-23

## 2018-04-23 ENCOUNTER — EVALUATION (OUTPATIENT)
Dept: PHYSICAL THERAPY | Facility: CLINIC | Age: 53
End: 2018-04-23
Payer: OTHER MISCELLANEOUS

## 2018-04-23 DIAGNOSIS — Z98.890 STATUS POST ARTHROSCOPIC SURGERY OF LEFT KNEE: Primary | ICD-10-CM

## 2018-04-23 DIAGNOSIS — S83.242D ACUTE MEDIAL MENISCUS TEAR, LEFT, SUBSEQUENT ENCOUNTER: ICD-10-CM

## 2018-04-23 PROCEDURE — G8979 MOBILITY GOAL STATUS: HCPCS | Performed by: PHYSICAL THERAPIST

## 2018-04-23 PROCEDURE — G8978 MOBILITY CURRENT STATUS: HCPCS | Performed by: PHYSICAL THERAPIST

## 2018-04-23 PROCEDURE — 97110 THERAPEUTIC EXERCISES: CPT | Performed by: PHYSICAL THERAPIST

## 2018-04-23 PROCEDURE — 97161 PT EVAL LOW COMPLEX 20 MIN: CPT | Performed by: PHYSICAL THERAPIST

## 2018-04-23 PROCEDURE — 97535 SELF CARE MNGMENT TRAINING: CPT | Performed by: PHYSICAL THERAPIST

## 2018-04-23 PROCEDURE — 97140 MANUAL THERAPY 1/> REGIONS: CPT | Performed by: PHYSICAL THERAPIST

## 2018-04-23 NOTE — PROGRESS NOTES
PT Evaluation     Today's date: 2018  Patient name: Myesha Castaneda  : 1965  MRN: [id number]  Referring provider: Fili Lzaaro MD  Dx:   Encounter Diagnosis     ICD-10-CM    1  Status post arthroscopic surgery of left knee Z98 890    2  Acute medial meniscus tear, left, subsequent encounter E16 860D                   Assessment  Understanding of Dx/Px/POC: good   Prognosis: good    Goals  STGs: To be complete within 4 weeks  - Decrease pain to < 2/10 at worst  - Increase AROM to WNL  - Increase strength to > 4+/5  - Improve gait to WNL for distances < 6 blocks    LTGs: To be complete within 6 weeks  - Able to walk for any extended amount of time/distance without pain or limitation for increased safety and functional capacity with ADLs and work-related duty  - Able to repetitively squat without pain or limitation for increased safety and functional capacity with ADLs and work-related duty  - Able to repetitively ascend/descend a full flight of stairs without pain or limitation for increased safety and functional capacity with ADLs and work-related duty  - Able to repetitively complete transfers without pain or limitation for increased safety and functional capacity with ADLs and work-related duty  - Able to keel for any extended amount of time without pain or limitation for increased safety and functional capacity with ADLs and work-related duty    Plan  Frequency: 3x week  Duration in weeks: 4     Pt is a 46 y o  male with L knee pain and is s/p L knee meniscectomy 18, who presents with functional deficits including decreased capacity with walking, squatting, kneeling, stairs, and transfers  Upon completion of today's initial evaluation, Ancelmo's sx remain consistent with being s/p L knee meniscectomy  Pt will benefit from skilled physical therapy to address current deficits      Subjective Evaluation    Pain  Current pain ratin  At best pain ratin  At worst pain ratin  Location: L knee       Pt reports he is s/p L knee arthroscopic meniscectomy 4/6/18      Objective Tests: Isa's (-)  Pain level ranges 2-8/10  AROM: L knee -2 - 110 degrees; R knee 0-125 degrees  Strength: L quad and hamstring 3-/5  Gait: Mod lateral limp, L knee extension lag  Incision: Clean and heeling well, no signs/sx of infection (continue to monitor)  Swelling: Mod (continue to monitor)  Unable to walk without pain and limitation  Unable to squat without pain and limitation  Unable complete transfers without pain and limitation  Unable to ascend/descend stairs without pain and limitation  Unable to kneel without pain and limitation    Flowsheet Rows    Flowsheet Row Most Recent Value   PT/OT G-Codes   Current Score  30   Projected Score  56   FOTO information reviewed  Yes   Assessment Type  Evaluation   G code set  Mobility: Walking & Moving Around   Mobility: Walking and Moving Around Current Status ()  CL   Mobility: Walking and Moving Around Goal Status ()  CH          Precautions: MD protocol for L knee meniscectomy    Daily Treatment Diary    HEP: Sheet provided and discussed    Manual  4/23/18            ART x25'            IASTM             JM             PROM and LE stretch             STM               Exercise Diary  4/23/18            TM             Bike             NuStep             Standing 1/2 Roll calf stretch 6x20''            SL Ecc HR             HR/TR 3x10            TB TKE 3x10 L4            TB Heel to Toes             Forward/backward heel to toe walk             Sit to stand             No shoe AE [employer] with Add squeeze             SL Bridge             Clam shells             SL Sit to Stand             BOSU SLB             Upside down BOSU SL squat holds             TB Lat Walks             Step ups/downs             HS into QS 3x10            Ankle pumps 3x10            QS back and forth 3x10            Prone hip ext 3x10            Prone hip IR with TB 3x10 L3            Prone hip ER with ball 3x10            Standing HS stretch with Ant pelvic tilt 4x20''                Modalities  4/23/18            MHP             CP x12'            Stim

## 2018-04-23 NOTE — LETTER
2018    Abhay Alves MD  [address]  [facility] [address] [place] [address]    Patient: Brea Garcia  YOB: 1965   Date of Visit: 2018     Encounter Diagnosis     ICD-10-CM    1  Status post arthroscopic surgery of left knee Z98 890    2  Acute medial meniscus tear, left, subsequent encounter O34 116M        Dear Dr Story Argue:    Please review the attached Plan of Care from [facility] recent visit  Please verify that you agree therapy should continue by signing the attached document and sending it back to our office  If you have any questions or concerns, please don't hesitate to call  Sincerely,    Umu Forrest, PT, DPT, ATC, ART      Referring Provider:      I certify that I have read the below Plan of Care and certify the need for these services furnished under this plan of treatment while under my care  Abhay Alves MD  [address]  [place] [address] [id number]  VIA Facsimile: [phone]          PT Evaluation     Today's date: 2018  Patient name: Brea Garcia  : 1965  MRN: [id number]  Referring provider: Sophia Trejo MD  Dx:   Encounter Diagnosis     ICD-10-CM    1  Status post arthroscopic surgery of left knee Z98 890    2   Acute medial meniscus tear, left, subsequent encounter S87 028D                   Assessment  Understanding of Dx/Px/POC: good   Prognosis: good    Goals  STGs: To be complete within 4 weeks  - Decrease pain to < 2/10 at worst  - Increase AROM to WNL  - Increase strength to > 4+/5  - Improve gait to WNL for distances < 6 blocks    LTGs: To be complete within 6 weeks  - Able to walk for any extended amount of time/distance without pain or limitation for increased safety and functional capacity with ADLs and work-related duty  - Able to repetitively squat without pain or limitation for increased safety and functional capacity with ADLs and work-related duty  - Able to repetitively ascend/descend a full flight of stairs without pain or limitation for increased safety and functional capacity with ADLs and work-related duty  - Able to repetitively complete transfers without pain or limitation for increased safety and functional capacity with ADLs and work-related duty  - Able to keel for any extended amount of time without pain or limitation for increased safety and functional capacity with ADLs and work-related duty    Plan  Frequency: 3x week  Duration in weeks: 4     Pt is a 46 y o  male with L knee pain and is s/p L knee meniscectomy 18, who presents with functional deficits including decreased capacity with walking, squatting, kneeling, stairs, and transfers  Upon completion of today's initial evaluation, Ancelmo's sx remain consistent with being s/p L knee meniscectomy  Pt will benefit from skilled physical therapy to address current deficits  Subjective Evaluation    Pain  Current pain ratin  At best pain ratin  At worst pain ratin  Location: L knee       Pt reports he is s/p L knee arthroscopic meniscectomy 18      Objective Tests: Isa's (-)  Pain level ranges 2-8/10  AROM: L knee -2 - 110 degrees; R knee 0-125 degrees  Strength: L quad and hamstring 3-/5  Gait: Mod lateral limp, L knee extension lag  Incision: Clean and heeling well, no signs/sx of infection (continue to monitor)  Swelling: Mod (continue to monitor)  Unable to walk without pain and limitation  Unable to squat without pain and limitation  Unable complete transfers without pain and limitation  Unable to ascend/descend stairs without pain and limitation  Unable to kneel without pain and limitation    Flowsheet Rows    Flowsheet Row Most Recent Value   PT/OT G-Codes   Current Score  30   Projected Score  56   FOTO information reviewed  Yes   Assessment Type  Evaluation   G code set  Mobility: Walking & Moving Around   Mobility: Walking and Moving Around Current Status ()  CL   Mobility: Walking and Moving Around Goal Status ()  CH          Precautions: MD protocol for L knee meniscectomy    Daily Treatment Diary    HEP: Sheet provided and discussed    Manual  4/23/18            ART x25'            IASTM             JM             PROM and LE stretch             STM               Exercise Diary  4/23/18            TM             Bike             NuStep             Standing 1/2 Roll calf stretch 6x20''            SL Ecc HR             HR/TR 3x10            TB TKE 3x10 L4            TB Heel to Toes             Forward/backward heel to toe walk             Sit to stand             No shoe AE [employer] with Add squeeze             SL Bridge             Clam shells             SL Sit to Stand             BOSU SLB             Upside down BOSU SL squat holds             TB Lat Walks             Step ups/downs             HS into QS 3x10            Ankle pumps 3x10            QS back and forth 3x10            Prone hip ext 3x10            Prone hip IR with TB 3x10 L3            Prone hip ER with ball 3x10            Standing HS stretch with Ant pelvic tilt 4x20''                Modalities  4/23/18            MHP             CP x12'            Stim

## 2018-04-25 ENCOUNTER — OFFICE VISIT (OUTPATIENT)
Dept: PHYSICAL THERAPY | Facility: CLINIC | Age: 53
End: 2018-04-25
Payer: OTHER MISCELLANEOUS

## 2018-04-25 DIAGNOSIS — S83.242D ACUTE MEDIAL MENISCUS TEAR, LEFT, SUBSEQUENT ENCOUNTER: ICD-10-CM

## 2018-04-25 DIAGNOSIS — Z98.890 STATUS POST ARTHROSCOPIC SURGERY OF LEFT KNEE: Primary | ICD-10-CM

## 2018-04-25 PROCEDURE — 97530 THERAPEUTIC ACTIVITIES: CPT | Performed by: PHYSICAL THERAPIST

## 2018-04-25 PROCEDURE — 97112 NEUROMUSCULAR REEDUCATION: CPT | Performed by: PHYSICAL THERAPIST

## 2018-04-25 PROCEDURE — 97140 MANUAL THERAPY 1/> REGIONS: CPT | Performed by: PHYSICAL THERAPIST

## 2018-04-25 PROCEDURE — 97110 THERAPEUTIC EXERCISES: CPT | Performed by: PHYSICAL THERAPIST

## 2018-04-25 NOTE — PROGRESS NOTES
Daily Note     Today's date: 2018  Patient name: Corie Deal  : 1965  MRN: [id number]  Referring provider: Ramakrishna Sales MD  Dx:   Encounter Diagnosis     ICD-10-CM    1  Status post arthroscopic surgery of left knee Z98 890    2  Acute medial meniscus tear, left, subsequent encounter T66 402Z                   Subjective: Pt reports some improvement in L knee pain since IE  Reports a history of L hip issues and had an injection in L hip yesterday and feeling sore from that  Anxious to continue making progress  Objective: See treatment diary below      Assessment: Tolerated treatment well  Patient demonstrated fatigue post treatment, exhibited good technique with therapeutic exercises and would benefit from continued PT      Plan: Continue per plan of care  Progress treatment as tolerated        Precautions: MD protocol for L knee meniscectomy    Daily Treatment Diary    HEP: Sheet provided and discussed    Manual  18            ART x20'            IASTM             JM             PROM and LE stretch             STM               Exercise Diary  18            TM x10'            Bike             NuStep             Standing 1/2 Roll calf stretch 6x20''            SL Ecc HR             HR/TR 3x10            TB TKE 3x10 L4            TB Heel to Toes 3x10 L3            Forward/backward heel to toe walk P Bars 3x10            Sit to stand             No shoe AE [employer] 3x10            Bridge with Add squeeze             SL Bridge             Clam shells             SL Sit to Stand             BOSU SLB             Upside down BOSU SL squat holds             TB Lat Walks 2x10 L4 P bars            Step ups/downs             HS into QS 3x10            Ankle pumps 3x10            QS back and forth 3x10            Prone hip ext 3x10            Prone hip IR with TB 3x10 L3            Prone hip ER with ball 3x10            Standing HS stretch with Ant pelvic tilt 4x20'' Modalities  4/25/18            P             CP x12'            Stim

## 2018-04-26 ENCOUNTER — OFFICE VISIT (OUTPATIENT)
Dept: PHYSICAL THERAPY | Facility: CLINIC | Age: 53
End: 2018-04-26
Payer: OTHER MISCELLANEOUS

## 2018-04-26 DIAGNOSIS — Z98.890 STATUS POST ARTHROSCOPIC SURGERY OF LEFT KNEE: Primary | ICD-10-CM

## 2018-04-26 DIAGNOSIS — S83.242D ACUTE MEDIAL MENISCUS TEAR, LEFT, SUBSEQUENT ENCOUNTER: ICD-10-CM

## 2018-04-26 PROCEDURE — 97110 THERAPEUTIC EXERCISES: CPT | Performed by: PHYSICAL THERAPIST

## 2018-04-26 PROCEDURE — 97140 MANUAL THERAPY 1/> REGIONS: CPT | Performed by: PHYSICAL THERAPIST

## 2018-04-26 PROCEDURE — 97112 NEUROMUSCULAR REEDUCATION: CPT | Performed by: PHYSICAL THERAPIST

## 2018-04-26 PROCEDURE — 97530 THERAPEUTIC ACTIVITIES: CPT | Performed by: PHYSICAL THERAPIST

## 2018-04-26 NOTE — PROGRESS NOTES
Daily Note     Today's date: 2018  Patient name: Porter Sanchez  : 1965  MRN: [id number]  Referring provider: Jerilyn Nova MD  Dx:   Encounter Diagnosis     ICD-10-CM    1  Status post arthroscopic surgery of left knee Z98 890    2  Acute medial meniscus tear, left, subsequent encounter S86 372D                   Subjective: Pt reports some improvement in L knee pain since IE, but feeling increased stiffness today  L hip feeling better today  Anxious to continue making progress  Objective: See treatment diary below      Assessment: Tolerated treatment well  Patient demonstrated fatigue post treatment, exhibited good technique with therapeutic exercises and would benefit from continued PT      Plan: Continue per plan of care  Progress treatment as tolerated        Precautions: MD protocol for L knee meniscectomy    Daily Treatment Diary    HEP: Sheet provided and discussed    Manual  18            ART x20'            IASTM             JM             PROM and LE stretch             STM               Exercise Diary  18            TM x10'            Bike             DD SLB 6x20''            Standing 1/2 Roll calf stretch 6x20''            SL Ecc HR             HR/TR 3x10            TB TKE 3x10 L4            TB Heel to Toes 3x10 L3            Forward/backward heel to toe walk P Bars 3x10            Sit to stand             No shoe AE [employer] 3x10            Bridge with Add squeeze             SL Bridge             Clam shells             SL Sit to Stand             BOSU SLB             Upside down BOSU SL squat holds             TB Lat Walks 2x10 L4 P bars            Step ups/downs             HS into QS 3x10            Ankle pumps 3x10            QS back and forth 3x10            Prone hip ext 3x10            Prone hip IR with TB 3x10 L3            Prone hip ER with ball 3x10            Standing HS stretch with Ant pelvic tilt 4x20''                Modalities  18 MANOJ             CP x12'            Stim

## 2018-04-27 ENCOUNTER — OFFICE VISIT (OUTPATIENT)
Dept: FAMILY MEDICINE CLINIC | Facility: CLINIC | Age: 53
End: 2018-04-27
Payer: COMMERCIAL

## 2018-04-27 VITALS
DIASTOLIC BLOOD PRESSURE: 86 MMHG | BODY MASS INDEX: 38.94 KG/M2 | HEIGHT: 70 IN | WEIGHT: 272 LBS | SYSTOLIC BLOOD PRESSURE: 118 MMHG

## 2018-04-27 DIAGNOSIS — J32.9 SINUSITIS, UNSPECIFIED CHRONICITY, UNSPECIFIED LOCATION: Primary | ICD-10-CM

## 2018-04-27 DIAGNOSIS — J40 BRONCHITIS: ICD-10-CM

## 2018-04-27 PROBLEM — M25.559 HIP PAIN: Status: ACTIVE | Noted: 2018-04-27

## 2018-04-27 PROBLEM — S69.90XA INJURY OF WRIST: Status: ACTIVE | Noted: 2018-04-27

## 2018-04-27 PROBLEM — M77.8 ENTHESOPATHY OF WRIST: Status: ACTIVE | Noted: 2018-04-27

## 2018-04-27 PROBLEM — M25.519 SHOULDER JOINT PAIN: Status: ACTIVE | Noted: 2018-04-27

## 2018-04-27 PROBLEM — M25.9 DISORDER OF WRIST JOINT: Status: ACTIVE | Noted: 2018-04-27

## 2018-04-27 PROBLEM — M94.20 CHONDROMALACIA: Status: ACTIVE | Noted: 2018-04-27

## 2018-04-27 PROBLEM — M17.10 OSTEOARTHRITIS OF KNEE: Status: ACTIVE | Noted: 2018-04-27

## 2018-04-27 PROBLEM — M16.9 OSTEOARTHRITIS OF HIP: Status: ACTIVE | Noted: 2018-04-27

## 2018-04-27 PROBLEM — M25.569 KNEE PAIN: Status: ACTIVE | Noted: 2018-04-27

## 2018-04-27 PROBLEM — M71.9 DISORDER OF BURSAE OF SHOULDER REGION: Status: ACTIVE | Noted: 2018-04-27

## 2018-04-27 PROBLEM — M65.9 TENOSYNOVITIS: Status: ACTIVE | Noted: 2018-04-27

## 2018-04-27 PROBLEM — M65.90 TENOSYNOVITIS: Status: ACTIVE | Noted: 2018-04-27

## 2018-04-27 PROBLEM — M24.139 ARTICULAR CARTILAGE DISORDER OF WRIST: Status: ACTIVE | Noted: 2018-04-27

## 2018-04-27 PROBLEM — M25.9 DISORDER OF SHOULDER: Status: ACTIVE | Noted: 2018-04-27

## 2018-04-27 PROBLEM — M93.1 KIENBOCK'S DISEASE OF ADULTS: Status: ACTIVE | Noted: 2018-04-27

## 2018-04-27 PROBLEM — M25.529 PAIN IN ELBOW: Status: ACTIVE | Noted: 2018-04-27

## 2018-04-27 PROBLEM — M17.9 OSTEOARTHRITIS OF KNEE: Status: ACTIVE | Noted: 2018-04-27

## 2018-04-27 PROBLEM — M19.90 OSTEOARTHRITIS: Status: ACTIVE | Noted: 2018-04-27

## 2018-04-27 PROBLEM — S83.289A TEAR OF LATERAL CARTILAGE OR MENISCUS OF KNEE, CURRENT: Status: ACTIVE | Noted: 2018-04-27

## 2018-04-27 PROCEDURE — 99214 OFFICE O/P EST MOD 30 MIN: CPT | Performed by: PHYSICIAN ASSISTANT

## 2018-04-27 RX ORDER — BENZONATATE 100 MG/1
100 CAPSULE ORAL 3 TIMES DAILY PRN
Qty: 30 CAPSULE | Refills: 0 | Status: SHIPPED | OUTPATIENT
Start: 2018-04-27 | End: 2018-08-10 | Stop reason: ALTCHOICE

## 2018-04-27 RX ORDER — ALBUTEROL SULFATE 90 UG/1
2 AEROSOL, METERED RESPIRATORY (INHALATION) EVERY 4 HOURS PRN
Qty: 1 INHALER | Refills: 0 | Status: SHIPPED | OUTPATIENT
Start: 2018-04-27 | End: 2019-05-15 | Stop reason: SDUPTHER

## 2018-04-27 RX ORDER — LEVOFLOXACIN 500 MG/1
500 TABLET, FILM COATED ORAL EVERY 24 HOURS
Qty: 10 TABLET | Refills: 0 | Status: SHIPPED | OUTPATIENT
Start: 2018-04-27 | End: 2018-05-07

## 2018-04-27 NOTE — PROGRESS NOTES
Assessment/Plan:         Diagnoses and all orders for this visit:    Sinusitis, unspecified chronicity, unspecified location  -     levofloxacin (LEVAQUIN) 500 mg tablet; Take 1 tablet (500 mg total) by mouth every 24 hours for 10 days    Bronchitis  -     levofloxacin (LEVAQUIN) 500 mg tablet; Take 1 tablet (500 mg total) by mouth every 24 hours for 10 days  -     benzonatate (TESSALON PERLES) 100 mg capsule; Take 1 capsule (100 mg total) by mouth 3 (three) times a day as needed for cough  -     albuterol (VENTOLIN HFA) 90 mcg/act inhaler; Inhale 2 puffs every 4 (four) hours as needed for wheezing or shortness of breath          Subjective:      Patient ID: Chinmay Stanley  is a 46 y o  male  URI    This is a new problem  The current episode started in the past 7 days  The problem has been unchanged  There has been no fever  Associated symptoms include congestion, coughing, rhinorrhea, sinus pain and wheezing  Pertinent negatives include no abdominal pain, chest pain, diarrhea, ear pain, headaches, nausea, plugged ear sensation, rash, sneezing, sore throat, swollen glands or vomiting  Treatments tried: Delsym  The treatment provided mild relief  The following portions of the patient's history were reviewed and updated as appropriate:   He  has a past medical history of Bee allergy status; Benign lipomatous neoplasm; Cellulitis of mid back region; High risk medication use;  Internal derangement of knee, left; Pelvic cramping; Prostatism; and Sebaceous cyst   He   Patient Active Problem List    Diagnosis Date Noted    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    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    Knee pain 04/27/2018    Osteoarthritis 04/27/2018    Osteoarthritis of hip 04/27/2018    Osteoarthritis of knee 04/27/2018    Tenosynovitis 04/27/2018    Shoulder joint pain 04/27/2018    Pain in elbow 04/27/2018    Injury of wrist 04/27/2018    Hip pain 04/27/2018    Enthesopathy of wrist 04/27/2018    Disorder of wrist joint 04/27/2018    Disorder of shoulder 04/27/2018    Tear of lateral cartilage or meniscus of knee, current 04/27/2018    Disorder of bursae of shoulder region 04/27/2018    Articular cartilage disorder of wrist 04/27/2018     He  has a past surgical history that includes ARTHROSCOPY ELBOW (Left, 2003); ARTHROSCOPY KNEE (Right); Shoulder arthroscopy (Left, 2003); Shoulder arthroscopy (Right, 04/2012); TONSILLECTOMY; Wrist surgery (Left); Knee surgery; and Hernia repair  His family history includes Asthma in his mother; Diabetes type II in his maternal grandfather  He  reports that he has never smoked  He quit smokeless tobacco use about 8 years ago  He reports that he drinks alcohol  He reports that he does not use drugs  Current Outpatient Prescriptions   Medication Sig Dispense Refill    EPINEPHrine (EPIPEN 2-HOANG) 0 3 mg/0 3 mL SOAJ Inject as directed Twice daily      Multiple Vitamins-Minerals (PX MENS MULTIVITAMINS) TABS Take 1 tablet by mouth daily      SUMAtriptan (IMITREX) 50 mg tablet Take 1 tablet by mouth      albuterol (VENTOLIN HFA) 90 mcg/act inhaler Inhale 2 puffs every 4 (four) hours as needed for wheezing or shortness of breath 1 Inhaler 0    benzonatate (TESSALON PERLES) 100 mg capsule Take 1 capsule (100 mg total) by mouth 3 (three) times a day as needed for cough 30 capsule 0    levofloxacin (LEVAQUIN) 500 mg tablet Take 1 tablet (500 mg total) by mouth every 24 hours for 10 days 10 tablet 0     No current facility-administered medications for this visit  Current Outpatient Prescriptions on File Prior to Visit   Medication Sig    EPINEPHrine (EPIPEN 2-HOANG) 0 3 mg/0 3 mL SOAJ Inject as directed Twice daily    Multiple Vitamins-Minerals (PX MENS MULTIVITAMINS) TABS Take 1 tablet by mouth daily    SUMAtriptan (IMITREX) 50 mg tablet Take 1 tablet by mouth     No current facility-administered medications on file prior to visit        He is allergic to bee venom; other; and contrast  [iodinated diagnostic agents]       Review of Systems   Constitutional: Positive for fatigue  Negative for chills and fever  HENT: Positive for congestion, postnasal drip, rhinorrhea, sinus pain and sinus pressure  Negative for ear pain, sneezing, sore throat and voice change  Respiratory: Positive for cough and wheezing  Negative for shortness of breath  Cardiovascular: Negative for chest pain, palpitations and leg swelling  Gastrointestinal: Negative for abdominal pain, constipation, diarrhea, nausea and vomiting  Musculoskeletal: Negative for arthralgias and myalgias  Skin: Negative for rash  Neurological: Negative for dizziness, light-headedness and headaches  Objective:      /86 (BP Location: Left arm, Patient Position: Sitting)   Ht 5' 10" (1 778 m)   Wt 123 kg (272 lb)   BMI 39 03 kg/m²          Physical Exam   Constitutional: He is oriented to person, place, and time  He appears well-developed and well-nourished  No distress  HENT:   Head: Normocephalic and atraumatic  Right Ear: Hearing, external ear and ear canal normal  Tympanic membrane is injected  Left Ear: Hearing, external ear and ear canal normal  Tympanic membrane is injected  Mouth/Throat: Uvula is midline and mucous membranes are normal  Posterior oropharyngeal erythema present  No oropharyngeal exudate, posterior oropharyngeal edema or tonsillar abscesses  Eyes: Conjunctivae are normal  Right eye exhibits no discharge  Left eye exhibits no discharge  No scleral icterus  Neck: Neck supple  No thyromegaly present  Cardiovascular: Normal rate, regular rhythm and normal heart sounds  Pulmonary/Chest: Effort normal  No respiratory distress  He has wheezes  Lymphadenopathy:     He has cervical adenopathy  Neurological: He is alert and oriented to person, place, and time  Skin: Skin is warm  No rash noted  He is not diaphoretic  No erythema     Psychiatric: He has a normal mood and affect  His behavior is normal  Judgment and thought content normal    Vitals reviewed

## 2018-05-01 ENCOUNTER — OFFICE VISIT (OUTPATIENT)
Dept: PHYSICAL THERAPY | Facility: CLINIC | Age: 53
End: 2018-05-01
Payer: OTHER MISCELLANEOUS

## 2018-05-01 DIAGNOSIS — S83.242D ACUTE MEDIAL MENISCUS TEAR, LEFT, SUBSEQUENT ENCOUNTER: ICD-10-CM

## 2018-05-01 DIAGNOSIS — Z98.890 STATUS POST ARTHROSCOPIC SURGERY OF LEFT KNEE: Primary | ICD-10-CM

## 2018-05-01 PROCEDURE — 97530 THERAPEUTIC ACTIVITIES: CPT | Performed by: PHYSICAL THERAPIST

## 2018-05-01 PROCEDURE — 97140 MANUAL THERAPY 1/> REGIONS: CPT | Performed by: PHYSICAL THERAPIST

## 2018-05-01 PROCEDURE — 97110 THERAPEUTIC EXERCISES: CPT | Performed by: PHYSICAL THERAPIST

## 2018-05-01 PROCEDURE — 97112 NEUROMUSCULAR REEDUCATION: CPT | Performed by: PHYSICAL THERAPIST

## 2018-05-01 NOTE — PROGRESS NOTES
Daily Note     Today's date: 2018  Patient name: Uma Chandler  : 1965  MRN: [id number]  Referring provider: Ezequiel Palacios MD  Dx:   Encounter Diagnosis     ICD-10-CM    1  Status post arthroscopic surgery of left knee Z98 890    2  Acute medial meniscus tear, left, subsequent encounter J21 606D                   Subjective: Pt reports some improvement in L knee pain since IE, but still progressing much slower than expected in regards to pain level  Anxious to continue making progress  Objective: See treatment diary below      Assessment: Tolerated treatment well  Patient demonstrated fatigue post treatment, exhibited good technique with therapeutic exercises and would benefit from continued PT      Plan: Continue per plan of care  Progress treatment as tolerated        Precautions: MD protocol for L knee meniscectomy    Daily Treatment Diary    HEP: Sheet provided and discussed    Manual  18            ART x20'            IASTM             JM             PROM and LE stretch             STM               Exercise Diary  18            TM x10'            Stool Skoots 4x30'            DD SLB 6x20''            Standing 1/2 Roll calf stretch 6x20''            SL Ecc HR 3x10            HR/TR 3x10            TB TKE 3x10 L4            TB Heel to Toes 3x10 L3            Forward/backward heel to toe walk P Bars 3x10            Sit to stand 3x10            No shoe AE [employer] 3x10            Bridge with Add squeeze             SL Bridge             Clam shells             SL Sit to Stand             BOSU SLB             Upside down BOSU SL squat holds             TB Lat Walks 2x10 L4 P bars            Step ups/downs 8''/6'' 3x10 ea            HS into QS 3x10            Ankle pumps 3x10            QS back and forth 3x10            Prone hip ext 3x10            Prone hip IR with TB 3x10 L3            Prone hip ER with ball 3x10            Standing HS stretch with Ant pelvic tilt 4x20'' Modalities  5/1/18            KATELYNN             CP x12'            Stim

## 2018-05-02 ENCOUNTER — OFFICE VISIT (OUTPATIENT)
Dept: PHYSICAL THERAPY | Facility: CLINIC | Age: 53
End: 2018-05-02
Payer: OTHER MISCELLANEOUS

## 2018-05-02 DIAGNOSIS — Z98.890 STATUS POST ARTHROSCOPIC SURGERY OF LEFT KNEE: Primary | ICD-10-CM

## 2018-05-02 PROCEDURE — 97140 MANUAL THERAPY 1/> REGIONS: CPT

## 2018-05-02 PROCEDURE — 97010 HOT OR COLD PACKS THERAPY: CPT

## 2018-05-02 PROCEDURE — 97110 THERAPEUTIC EXERCISES: CPT

## 2018-05-02 NOTE — PROGRESS NOTES
Daily Note     Today's date: 2018  Patient name: Dandre Menard  : 1965  MRN: [id number]  Referring provider: Jean Paul Jamison MD  Dx:   Encounter Diagnosis     ICD-10-CM    1  Status post arthroscopic surgery of left knee Z98 890                   Subjective: Pt reports he cont to have pain along lateral L knee  Reports pain with bending and straightening  Objective: See treatment diary below      Assessment: Tolerated treatment well  Patient demonstrated fatigue post treatment  Pt cont to c/o pain with knee ext and flx  Reports pain along lateral portion of L knee  Pt fabián program with mild antalgic gait on treadmill  Pt cont to be challenged with current program        Plan: Continue per plan of care       Precautions: MD protocol for L knee meniscectomy     Daily Treatment Diary     HEP: Sheet provided and discussed     Manual  18                   ART x20'                     IASTM                       JM                       PROM and LE stretch    15'                   STM                          Exercise Diary  18                   TM x10'  10'                   Stool Skoots 4x30'  430"                   DD SLB 6x20''  620"                   Standing 1/2 Roll calf stretch 6x20''  620"                   SL Ecc HR 3x10  3/10                   HR/TR 3x10  3/10                   TB TKE 3x10 L4  3/10 L4                   TB Heel to Toes 3x10 L3  3/10 L4                   Forward/backward heel to toe walk P Bars 3x10  3/10                   Sit to stand 3x10  3/10                   No shoe AE Ste[employer]ats 3x10  3/10                   Bridge with Add squeeze                       SL Bridge                       Clam shells                       SL Sit to Stand                       BOSU SLB                       Upside down BOSU SL squat holds                       TB Lat Walks 2x10 L4 P bars  2/10 L4                   Step ups/downs 8''/6'' 3x10 ea  3/10                   HS into QS 3x10  3/10 into SLR                   Ankle pumps 3x10  3/10                   QS back and forth 3x10  3/10                   Prone hip ext 3x10  3/10                   Prone hip IR with TB 3x10 L3  3/10 L3                   Prone hip ER with ball 3x10 3/10                   Standing HS stretch with Ant pelvic tilt 4x20'' 4/20"                         Modalities  5/1/18 5/2/18                   MHP                       CP x12'  10'                   Stim

## 2018-05-03 ENCOUNTER — OFFICE VISIT (OUTPATIENT)
Dept: PHYSICAL THERAPY | Facility: CLINIC | Age: 53
End: 2018-05-03
Payer: OTHER MISCELLANEOUS

## 2018-05-03 DIAGNOSIS — Z98.890 STATUS POST ARTHROSCOPIC SURGERY OF LEFT KNEE: Primary | ICD-10-CM

## 2018-05-03 DIAGNOSIS — S83.242D ACUTE MEDIAL MENISCUS TEAR, LEFT, SUBSEQUENT ENCOUNTER: ICD-10-CM

## 2018-05-03 PROCEDURE — 97112 NEUROMUSCULAR REEDUCATION: CPT | Performed by: PHYSICAL THERAPIST

## 2018-05-03 PROCEDURE — 97530 THERAPEUTIC ACTIVITIES: CPT | Performed by: PHYSICAL THERAPIST

## 2018-05-03 PROCEDURE — 97110 THERAPEUTIC EXERCISES: CPT | Performed by: PHYSICAL THERAPIST

## 2018-05-03 PROCEDURE — G8979 MOBILITY GOAL STATUS: HCPCS | Performed by: PHYSICAL THERAPIST

## 2018-05-03 PROCEDURE — G8978 MOBILITY CURRENT STATUS: HCPCS | Performed by: PHYSICAL THERAPIST

## 2018-05-03 PROCEDURE — 97140 MANUAL THERAPY 1/> REGIONS: CPT | Performed by: PHYSICAL THERAPIST

## 2018-05-03 NOTE — LETTER
May 3, 2018    Ayse Marshall MD  [address] [place] [address]    Patient: Melba Olivo  YOB: 1965   Date of Visit: 5/3/2018     Encounter Diagnosis     ICD-10-CM    1  Status post arthroscopic surgery of left knee Z98 890    2  Acute medial meniscus tear, left, subsequent encounter K96 902Y        Dear Dr Rene Peter:    Please review the attached Plan of Care from [facility] recent visit  Please verify that you agree therapy should continue by signing the attached document and sending it back to our office  If you have any questions or concerns, please don't hesitate to call  Sincerely,    Tierra Drummond, PT, DPT, ATC, ART      Referring Provider:      I certify that I have read the below Plan of Care and certify the need for these services furnished under this plan of treatment while under my care  Ayse Marshall MD  [address] [place] [address]  VIA Facsimile: [phone]          PT Re-Evaluation     Today's date: 5/3/2018  Patient name: Melba Olivo  : 1965  MRN: [id number]  Referring provider: Hunter Cowart MD  Dx:   Encounter Diagnosis     ICD-10-CM    1  Status post arthroscopic surgery of left knee Z98 890    2   Acute medial meniscus tear, left, subsequent encounter S85 076D                   Assessment  Understanding of Dx/Px/POC: good   Prognosis: good    Goals  STGs: To be complete within 4 weeks  - Decrease pain to < 2/10 at worst (partially met)  - Increase AROM to WNL (partially met)  - Increase strength to 5/5 (partially met)  - Improve gait to WNL for distances < 6 blocks (partially met)    LTGs: To be complete within 6 weeks  - Able to walk for any extended amount of time/distance without pain or limitation for increased safety and functional capacity with ADLs and work-related duty (partially met)  - Able to repetitively squat without pain or limitation for increased safety and functional capacity with ADLs and work-related duty (partially met)  - Able to repetitively ascend/descend a full flight of stairs without pain or limitation for increased safety and functional capacity with ADLs and work-related duty (partially met)  - Able to repetitively complete transfers without pain or limitation for increased safety and functional capacity with ADLs and work-related duty (partially met)  - Able to keel for any extended amount of time without pain or limitation for increased safety and functional capacity with ADLs and work-related duty (partially met)    Plan  Frequency: 3x week  Duration in weeks: 4     Upon completion of today's re-evaluation, as evidenced by present objective and subjective measures, Yolies sx remain consistent with continued, but slow progress from being s/p L knee meniscectomy 18  Pt will benefit from continued skilled physical therapy to address current deficits  Subjective Evaluation    Pain  Current pain ratin  At best pain ratin  At worst pain ratin  Location: L knee       Pt reports he feels he is making progress with strength, ROM, and overall pain level  However, reports the knee is not as stable as he needs it to be and the pain level continue to hinder his overall progress  Reports he continues to see benefit from from physical therapy and remains anxious to continue making progress      Objective Tests: Isa's (-)  Pain level ranges 1-6/10  AROM: L knee 0 - 120 degrees (with pain); R knee 0-125 degrees  Strength: L quad and hamstring 4-/5  Gait: Mild lateral limp that worsens with increased pain, L knee extension lag  Incision: Clean and heeling well, no signs/sx of infection (continue to monitor)  Swelling: Mild to mod (continue to monitor)  Unable to walk without pain and limitation, but improving  Unable to squat without pain and limitation, but improving  Unable complete transfers without pain and limitation, but improving  Unable to ascend/descend stairs without pain and limitation, but improving  Unable to kneel without pain and limitation, but improving        Precautions: MD protocol for L knee meniscectomy    Daily Treatment Diary    HEP: Sheet provided and discussed    Manual  5/3/18            ART x20'            IASTM             JM             PROM and LE stretch             STM               Exercise Diary  5/3/18            TM x10'            Stool Skoots 4x30'            DD SLB 6x20''            Standing 1/2 Roll calf stretch 6x20''            SL Ecc HR 3x10            HR/TR 3x10            TB TKE 3x10 L4            TB Heel to Toes 3x10 L3            Forward/backward heel to toe walk P Bars 3x10            Sit to stand 3x10            No shoe AE Steamboats 3x10            Bridge with Add squeeze             SL Bridge             Clam shells             SL Sit to Stand             BOSU SLB             Upside down BOSU SL squat holds             TB Lat Walks 2x10 L4 P bars            Step ups/downs 8''/6'' 3x10 ea            HS into QS 3x10            Ankle pumps 3x10            QS back and forth 3x10            Prone hip ext 3x10            Prone hip IR with TB 3x10 L3            Prone hip ER with ball 3x10            Standing HS stretch with Ant pelvic tilt 4x20''                Modalities  5/3/18            MHP             CP x12'            Stim

## 2018-05-03 NOTE — PROGRESS NOTES
PT Re-Evaluation     Today's date: 5/3/2018  Patient name: Suzi Gavin  : 1965  MRN: [id number]  Referring provider: Federica Rodriguez MD  Dx:   Encounter Diagnosis     ICD-10-CM    1  Status post arthroscopic surgery of left knee Z98 890    2  Acute medial meniscus tear, left, subsequent encounter E48 253D                   Assessment  Understanding of Dx/Px/POC: good   Prognosis: good    Goals  STGs: To be complete within 4 weeks  - Decrease pain to < 2/10 at worst (partially met)  - Increase AROM to WNL (partially met)  - Increase strength to 5/5 (partially met)  - Improve gait to WNL for distances < 6 blocks (partially met)    LTGs: To be complete within 6 weeks  - Able to walk for any extended amount of time/distance without pain or limitation for increased safety and functional capacity with ADLs and work-related duty (partially met)  - Able to repetitively squat without pain or limitation for increased safety and functional capacity with ADLs and work-related duty (partially met)  - Able to repetitively ascend/descend a full flight of stairs without pain or limitation for increased safety and functional capacity with ADLs and work-related duty (partially met)  - Able to repetitively complete transfers without pain or limitation for increased safety and functional capacity with ADLs and work-related duty (partially met)  - Able to keel for any extended amount of time without pain or limitation for increased safety and functional capacity with ADLs and work-related duty (partially met)    Plan  Frequency: 3x week  Duration in weeks: 4     Upon completion of today's re-evaluation, as evidenced by present objective and subjective measures, Ancelmo's sx remain consistent with continued, but slow progress from being s/p L knee meniscectomy 18  Pt will benefit from continued skilled physical therapy to address current deficits      Subjective Evaluation    Pain  Current pain ratin  At best pain ratin  At worst pain ratin  Location: L knee       Pt reports he feels he is making progress with strength, ROM, and overall pain level  However, reports the knee is not as stable as he needs it to be and the pain level continue to hinder his overall progress  Reports he continues to see benefit from from physical therapy and remains anxious to continue making progress      Objective Tests: Isa's (-)  Pain level ranges 1-6/10  AROM: L knee 0 - 120 degrees (with pain); R knee 0-125 degrees  Strength: L quad and hamstring 4-/5  Gait: Mild lateral limp that worsens with increased pain, L knee extension lag  Incision: Clean and heeling well, no signs/sx of infection (continue to monitor)  Swelling: Mild to mod (continue to monitor)  Unable to walk without pain and limitation, but improving  Unable to squat without pain and limitation, but improving  Unable complete transfers without pain and limitation, but improving  Unable to ascend/descend stairs without pain and limitation, but improving  Unable to kneel without pain and limitation, but improving        Precautions: MD protocol for L knee meniscectomy    Daily Treatment Diary    HEP: Sheet provided and discussed    Manual  5/3/18            ART x20'            IASTM             JM             PROM and LE stretch             STM               Exercise Diary  5/3/18            TM x10'            Stool Skoots 4x30'            DD SLB 6x20''            Standing 1/2 Roll calf stretch 6x20''            SL Ecc HR 3x10            HR/TR 3x10            TB TKE 3x10 L4            TB Heel to Toes 3x10 L3            Forward/backward heel to toe walk P Bars 3x10            Sit to stand 3x10            No shoe AE Steamboats 3x10            Bridge with Add squeeze             SL Bridge             Clam shells             SL Sit to Stand             BOSU SLB             Upside down BOSU SL squat holds             TB Lat Walks 2x10 L4 P bars            Step ups/downs 8''/6'' 3x10 ea            HS into QS 3x10            Ankle pumps 3x10            QS back and forth 3x10            Prone hip ext 3x10            Prone hip IR with TB 3x10 L3            Prone hip ER with ball 3x10            Standing HS stretch with Ant pelvic tilt 4x20''                Modalities  5/3/18            MHP             CP x12'            Stim

## 2018-05-04 ENCOUNTER — APPOINTMENT (OUTPATIENT)
Dept: PHYSICAL THERAPY | Facility: CLINIC | Age: 53
End: 2018-05-04
Payer: OTHER MISCELLANEOUS

## 2018-05-08 ENCOUNTER — OFFICE VISIT (OUTPATIENT)
Dept: PHYSICAL THERAPY | Facility: CLINIC | Age: 53
End: 2018-05-08
Payer: OTHER MISCELLANEOUS

## 2018-05-08 DIAGNOSIS — Z98.890 STATUS POST ARTHROSCOPIC SURGERY OF LEFT KNEE: Primary | ICD-10-CM

## 2018-05-08 PROCEDURE — 97110 THERAPEUTIC EXERCISES: CPT

## 2018-05-08 PROCEDURE — 97140 MANUAL THERAPY 1/> REGIONS: CPT

## 2018-05-08 PROCEDURE — 97010 HOT OR COLD PACKS THERAPY: CPT

## 2018-05-08 NOTE — PROGRESS NOTES
Daily Note     Today's date: 2018  Patient name: Scotty Villatoro  : 1965  MRN: [id number]  Referring provider: Meredith Salcido MD  Dx:   Encounter Diagnosis     ICD-10-CM    1  Status post arthroscopic surgery of left knee Z98 890                   Subjective: Pt reports he cont to have pain along knee joint and under patella  Reports increased pain with activity  Will see MD on Thursday  Objective: See treatment diary below      Assessment: Tolerated treatment fair  Patient demonstrated fatigue post treatment  Pt cont to c/o increased pain with current program  Pt progressed program with added Leg press and HS curl machine  Pt with cont joint line pain and patellar pain with all activity  Pt will cont to benefit from tx to address cont deficits and sx  Plan: Continue per plan of care       Precautions: MD protocol for L knee meniscectomy     Daily Treatment Diary     HEP: Sheet provided and discussed     Manual  5/3/18                     ART x20'                     IASTM                       JM                       PROM and LE stretch                       STM                          Exercise Diary  5/3/18  5/8/18                   TM x10'  10'                   Stool Skoots 4x30'  30'                   DD SLB 6x20''  20"                   Standing 1/2 Roll calf stretch 6x20''  20"                   SL Ecc HR 3x10  3/10                   HR/TR 3x10  3/10                   TB TKE 3x10 L4  3/10 L5                   TB Heel to Toes 3x10 L3  3/10 L5                   Forward/backward heel to toe walk P Bars 3x10  3/10                   Sit to stand 3x10  3/10                   No shoe AE Steamboats 3x10  3/10                   Bridge with Add squeeze                       SL Bridge                       Clam shells                       Leg press   3pl 3/10                   HS curl machine    6pl 3/10                   Upside down BOSU SL squat holds                       TB Lat Walks 2x10 L4 P bars  2/10 L4                   Step ups/downs 8''/6'' 3x10 ea  8" 3/10 ea                    HS into QS 3x10  3/10                   Ankle pumps 3x10  3/10                   QS back and forth 3x10  3/10                   Prone hip ext 3x10  3/10                   Prone hip IR with TB 3x10 L3  3/10 L3                   Prone hip ER with ball 3x10  3/10                   Standing HS stretch with Ant pelvic tilt 4x20''                           Modalities  5/3/18  5/8/18                   MHP                       CP x12' 10'                   Stim

## 2018-05-09 ENCOUNTER — OFFICE VISIT (OUTPATIENT)
Dept: PHYSICAL THERAPY | Facility: CLINIC | Age: 53
End: 2018-05-09
Payer: OTHER MISCELLANEOUS

## 2018-05-09 DIAGNOSIS — Z98.890 STATUS POST ARTHROSCOPIC SURGERY OF LEFT KNEE: Primary | ICD-10-CM

## 2018-05-09 PROCEDURE — 97140 MANUAL THERAPY 1/> REGIONS: CPT

## 2018-05-09 PROCEDURE — 97110 THERAPEUTIC EXERCISES: CPT

## 2018-05-09 PROCEDURE — 97010 HOT OR COLD PACKS THERAPY: CPT

## 2018-05-09 NOTE — PROGRESS NOTES
Daily Note     Today's date: 2018  Patient name: Saundra Rios  : 1965  MRN: [id number]  Referring provider: Gely Rodas MD  Dx:   Encounter Diagnosis     ICD-10-CM    1  Status post arthroscopic surgery of left knee Z98 890                   Subjective: Reports he will see MD tomorrow  Reports cont increased pain with exercises  Objective: See treatment diary below      Assessment: Tolerated treatment fair  Patient demonstrated fatigue post treatment  Pt with cont c/o pain along lateral joint line and patella  Pt cont to complete current program with c/o pain  Pt also with pain during manual stretching into knee ext  Plan: Continue per plan of care       Precautions: MD protocol for L knee meniscectomy     Daily Treatment Diary     HEP: Sheet provided and discussed     Manual  5/3/18                     ART x20'                     IASTM                       JM                       PROM and LE stretch                       STM                          Exercise Diary  5/3/18  5/8/18  5/9/18                 TM x10'  10'  10'                 Stool Skoots 4x30'  '  "                 DD SLB 6x20''  "  "                 Standing 1/2 Roll calf stretch 6x20''  "  "                 SL Ecc HR 3x10  3/10  3/10                 HR/TR 3x10  3/10  3/10                 TB TKE 3x10 L4  3/10 L5  3/10 L5                 TB Heel to Toes 3x10 L3  3/10 L5  3/10 L5                 Forward/backward heel to toe walk P Bars 3x10  3/10                   Sit to stand 3x10  3/10  3/10                 No shoe AE Steamboats 3x10  3/10  3/10                 Bridge with Add squeeze                       SL Bridge                       Clam shells                       Leg press   3pl 3/10  3pl 3/10                 HS curl machine    6pl 3/10  6pl 3/10                 Upside down BOSU SL squat holds                       TB Lat Walks 2x10 L4 P bars  2/10 L4  2/10 L4                 Step ups/downs 8''/6'' 3x10 ea  8" 3/10 ea   8" 3/10                 HS into QS 3x10  3/10  3/10 into SLR                 Ankle pumps 3x10  3/10                   QS back and forth 3x10  3/10  3/10                 Prone hip ext 3x10  3/10                   Prone hip IR with TB 3x10 L3  3/10 L3                   Prone hip ER with ball 3x10  3/10                   Standing HS stretch with Ant pelvic tilt 4x20''    4/20"                       Modalities  5/3/18  5/8/18  5/9/18                 MHP                       CP x12' 10'  10'                 Stim

## 2018-05-14 ENCOUNTER — OFFICE VISIT (OUTPATIENT)
Dept: PHYSICAL THERAPY | Facility: CLINIC | Age: 53
End: 2018-05-14
Payer: OTHER MISCELLANEOUS

## 2018-05-14 DIAGNOSIS — Z98.890 STATUS POST ARTHROSCOPIC SURGERY OF LEFT KNEE: Primary | ICD-10-CM

## 2018-05-14 PROCEDURE — 97110 THERAPEUTIC EXERCISES: CPT

## 2018-05-14 PROCEDURE — 97140 MANUAL THERAPY 1/> REGIONS: CPT

## 2018-05-14 NOTE — PROGRESS NOTES
Daily Note     Today's date: 2018  Patient name: Roni River  : 1965  MRN: [id number]  Referring provider: Ruddy Webster MD  Dx:   Encounter Diagnosis     ICD-10-CM    1  Status post arthroscopic surgery of left knee Z98 890                   Subjective: Pt reports he saw MD and will cont with PT x 4 weeks  States he cont to be off of work  Pt states he cont to have pain with increased activity  Objective: See treatment diary below      Assessment: Tolerated treatment well  Patient exhibited good technique with therapeutic exercises  Pt with cont patellar pain and joint line pain  PT attempted [place] taping today to help reduce patellar sx  Pt educated to wear taping today  Pt was able to complete progressions with increased weights  Plan: Continue per plan of care  Monitor taping      Precautions: MD protocol for L knee meniscectomy     Daily Treatment Diary     HEP: Sheet provided and discussed     Manual  5/3/18  5/14/18                   ART x20'                     IASTM                       JM                       PROM and LE stretch    15'                   STM                          Exercise Diary  5/3/18  5/8/18  5/9/18  5/14/18             TM x10'  10'  10'  10'             Stool Skoots 4x30'  '  "  "             DD SLB 6x20''  "  "  "             Standing 1/2 Roll calf stretch 6x20''  "  "  "             SL Ecc HR 3x10  3/10  3/10  3/10             HR/TR 3x10  3/10  3/10  3/10             TB TKE 3x10 L4  3/10 L5  3/10 L5  3/10 L5             TB Heel to Toes 3x10 L3  3/10 L5  3/10 L5  3/10 L5             Forward/backward heel to toe walk P Bars 3x10  3/10                 Sit to stand 3x10  3/10  3/10  SL 3/10             No shoe AE Steamboats 3x10  3/10  3/10  3/10             Bridge with Add squeeze                     SL Bridge                     Clam shells                     Leg press   3pl 3/10  3pl 3/10  5pl 3/10             HS curl machine    6pl 3/10  6pl 3/10  7pl 3/10             Upside down BOSU SL squat holds                     TB Lat Walks 2x10 L4 P bars  2/10 L4  2/10 L4  2/10 L4             Step ups/downs 8''/6'' 3x10 ea  8" 3/10 ea   8" 3/10  8" 3/10             HS into QS 3x10  3/10  3/10 into SLR  3/10 into slr             Ankle pumps 3x10  3/10                 QS back and forth 3x10  3/10  3/10  3/10             Prone hip ext 3x10  3/10    3/10             Prone hip IR with TB 3x10 L3  3/10 L3    3/10 L3             Prone hip ER with ball 3x10  3/10    3/10             Standing HS stretch with Ant pelvic tilt 4x20''    4/20"  4/20"                   Modalities  5/3/18  5/8/18  5/9/18  5/14/18               MHP                       CP x12' 10'  10'  10'               Stim

## 2018-05-15 ENCOUNTER — OFFICE VISIT (OUTPATIENT)
Dept: PHYSICAL THERAPY | Facility: CLINIC | Age: 53
End: 2018-05-15
Payer: OTHER MISCELLANEOUS

## 2018-05-15 DIAGNOSIS — S83.242D ACUTE MEDIAL MENISCUS TEAR, LEFT, SUBSEQUENT ENCOUNTER: Primary | ICD-10-CM

## 2018-05-15 PROCEDURE — 97110 THERAPEUTIC EXERCISES: CPT

## 2018-05-15 PROCEDURE — 97140 MANUAL THERAPY 1/> REGIONS: CPT

## 2018-05-15 PROCEDURE — 97010 HOT OR COLD PACKS THERAPY: CPT

## 2018-05-15 NOTE — PROGRESS NOTES
Daily Note     Today's date: 5/15/2018  Patient name: Dandre Menard  : 1965  MRN: [id number]  Referring provider: Jean Paul Jamison MD  Dx:   Encounter Diagnosis     ICD-10-CM    1  Acute medial meniscus tear, left, subsequent encounter U26 667L                   Subjective: Pt reports pain of 2/10 entering clinic  CC is soreness of the knee during session  Pt reports steady discomfort in patella during session  Objective: See treatment diary below        Assessment: Tolerated treatment well  Patient exhibited good technique with therapeutic exercises  Pt with cont patellar pain   Pt felt improved symptoms after yesterdays session  Pt deferred taping today to see if any change in soreness was noted    Pt was able to complete progressions with increased weights          Plan: Continue per plan of care      Precautions: MD protocol for L knee meniscectomy     Daily Treatment Diary     HEP: Sheet provided and discussed     Manual  5/3/18  5/14/18  5/15                 ART x20'                     IASTM                       JM                       PROM and LE stretch    15'  20'                 STM                          Exercise Diary  5/3/18  5/8/18  5/9/18  5/14/18 5/15           TM x10'  10'  10'  10'  8'           Stool Skoots 4x30'  '  "  " "           DD SLB 6x20''  "  "  " "           Standing 1/2 Roll calf stretch 6x20''  20"  "  " "           SL Ecc HR 3x10  3/10  3/10  3/10  3/10           HR/TR 3x10  3/10  3/10  3/10 3/10           TB TKE 3x10 L4  3/10 L5  3/10 L5  3/10 L5 3/10 LV 5           TB Heel to Toes 3x10 L3  3/10 L5  3/10 L5  3/10 L5 3/10 L5           Forward/backward heel to toe walk P Bars 3x10  3/10      3/10           Sit to stand 3x10  3/10  3/10  SL 3/10 SL 3/10           No shoe AE Steamboats 3x10  3/10  3/10  3/10  3/10           Bridge with Add squeeze                     SL Bridge                     Clam shells                     Leg press   3pl 3/10  3pl 3/10  5pl 3/10 5PL 3/10           HS curl machine    6pl 3/10  6pl 3/10  7pl 3/10 7PL /10           Upside down BOSU SL squat holds                     TB Lat Walks 2x10 L4 P bars  2/10 L4  2/10 L4  2/10 L4 2/10 LV 4           Step ups/downs 8''/6'' 3x10 ea  8" 3/10 ea   8" 3/10  8" 3/10 8" 3/10           HS into QS 3x10  3/10  3/10 into SLR  3/10 into slr  3/10 into SLR           Ankle pumps 3x10  3/10     3/10           QS back and forth 3x10  3/10  3/10  3/10  3/10           Prone hip ext 3x10  3/10    3/10  3/10           Prone hip IR with TB 3x10 L3  3/10 L3    3/10 L3  3/10 L3           Prone hip ER with ball 3x10  3/10    3/10  3/10           Standing HS stretch with Ant pelvic tilt 4x20''    4/20"  4/20"  4/20"                 Modalities  5/3/18  5/8/18  5/9/18 5/14/18 5/15             MHP                       CP x12' 10'  10'  10' 10'             Stim

## 2018-05-16 ENCOUNTER — OFFICE VISIT (OUTPATIENT)
Dept: PHYSICAL THERAPY | Facility: CLINIC | Age: 53
End: 2018-05-16
Payer: OTHER MISCELLANEOUS

## 2018-05-23 ENCOUNTER — OFFICE VISIT (OUTPATIENT)
Dept: PHYSICAL THERAPY | Facility: CLINIC | Age: 53
End: 2018-05-23
Payer: OTHER MISCELLANEOUS

## 2018-05-23 DIAGNOSIS — Z98.890 STATUS POST ARTHROSCOPIC SURGERY OF LEFT KNEE: ICD-10-CM

## 2018-05-23 DIAGNOSIS — S83.242D ACUTE MEDIAL MENISCUS TEAR, LEFT, SUBSEQUENT ENCOUNTER: Primary | ICD-10-CM

## 2018-05-23 PROCEDURE — 97140 MANUAL THERAPY 1/> REGIONS: CPT | Performed by: PHYSICAL THERAPIST

## 2018-05-23 PROCEDURE — 97112 NEUROMUSCULAR REEDUCATION: CPT | Performed by: PHYSICAL THERAPIST

## 2018-05-23 PROCEDURE — 97110 THERAPEUTIC EXERCISES: CPT | Performed by: PHYSICAL THERAPIST

## 2018-05-23 PROCEDURE — 97530 THERAPEUTIC ACTIVITIES: CPT | Performed by: PHYSICAL THERAPIST

## 2018-05-23 PROCEDURE — 97010 HOT OR COLD PACKS THERAPY: CPT | Performed by: PHYSICAL THERAPIST

## 2018-05-23 NOTE — PROGRESS NOTES
Daily Note     Today's date: 2018  Patient name: Peter Merlin  : 1965  MRN: [id number]  Referring provider: Red Perry MD  Dx:   Encounter Diagnosis     ICD-10-CM    1  Acute medial meniscus tear, left, subsequent encounter S83 242D    2  Status post arthroscopic surgery of left knee Z98 890                   Subjective: Pt reports today is the best he has felt since starting physical therapy  Reports he continues to have deficits that physical therapy is helping, but feels he is on the right track to hopefully return to work within a few weeks  Objective: See treatment diary below      Assessment: Tolerated treatment well  Patient exhibited good technique with therapeutic exercises  Pt was able to complete progressions of exercises with increased weights  Plan: Continue per plan of care       Precautions: MD protocol for L knee meniscectomy     Daily Treatment Diary     HEP: Sheet provided and discussed     Manual  5/3/18  5/14/18  5/15/18  5/23/18               ART x20'      x15'               IASTM                       JM                       PROM and LE stretch    15'                   STM                          Exercise Diary  5/3/18  5/8/18  5/9/18  5/14/18  5/15/18  5/23/18         TM x10'  10'  10'  10'    10'         Stool Skoots 4x30'  '  "  "    6x30'         DD SLB 6x20''  "  "  "            Standing 1/2 Roll calf stretch 6x20''  20"  "  "    6x20''         SL Ecc HR 3x10  3/10  3/10  3/10    3x10         HR/TR 3x10  3/10  3/10  3/10             TB TKE 3x10 L4  3/10 L5  3/10 L5  3/10 L5   4x10 L5         TB Heel to Toes 3x10 L3  3/10 L5  3/10 L5  3/10 L5             Forward/backward heel to toe walk P Bars 3x10  3/10                 Sit to stand 3x10  3/10  3/10  SL 3/10             No shoe AE Steamboats 3x10  3/10  3/10  3/10             SL Sit to stand            3x10 high table         SL Bridge            3x10         DL Bosu squat holds            6x25''         Leg press   3pl 3/10  3pl 3/10  5pl 3/10    8pl 3x10         HS curl machine    6pl 3/10  6pl 3/10  7pl 3/10             Upside down BOSU SL squat holds            4x25''         TB Lat Walks 2x10 L4 P bars  2/10 L4  2/10 L4  2/10 L4    3x 60' L4         Step ups/downs 8''/6'' 3x10 ea  8" 3/10 ea   8" 3/10  8" 3/10             HS into QS 3x10  3/10  3/10 into SLR  3/10 into slr             Ankle pumps 3x10  3/10                 QS back and forth 3x10  3/10  3/10  3/10             Prone hip ext 3x10  3/10    3/10             Prone hip IR with TB 3x10 L3  3/10 L3    3/10 L3             Prone hip ER with ball 3x10  3/10    3/10             SLS BOSU      6x25''      Standing HS stretch with Ant pelvic tilt 4x20''    4/20"  4/20"                   Modalities  5/3/18  5/8/18  5/9/18  5/14/18  5/15/18  5/23/18           MHP                       CP x12' 10'  10'  10'    x10'           Stim

## 2018-05-24 ENCOUNTER — OFFICE VISIT (OUTPATIENT)
Dept: PHYSICAL THERAPY | Facility: CLINIC | Age: 53
End: 2018-05-24
Payer: OTHER MISCELLANEOUS

## 2018-05-24 DIAGNOSIS — Z98.890 STATUS POST ARTHROSCOPIC SURGERY OF LEFT KNEE: ICD-10-CM

## 2018-05-24 DIAGNOSIS — S83.242D ACUTE MEDIAL MENISCUS TEAR, LEFT, SUBSEQUENT ENCOUNTER: Primary | ICD-10-CM

## 2018-05-24 PROCEDURE — 97112 NEUROMUSCULAR REEDUCATION: CPT | Performed by: PHYSICAL THERAPIST

## 2018-05-24 PROCEDURE — 97010 HOT OR COLD PACKS THERAPY: CPT | Performed by: PHYSICAL THERAPIST

## 2018-05-24 PROCEDURE — 97140 MANUAL THERAPY 1/> REGIONS: CPT | Performed by: PHYSICAL THERAPIST

## 2018-05-24 PROCEDURE — 97530 THERAPEUTIC ACTIVITIES: CPT | Performed by: PHYSICAL THERAPIST

## 2018-05-24 PROCEDURE — 97110 THERAPEUTIC EXERCISES: CPT | Performed by: PHYSICAL THERAPIST

## 2018-05-24 NOTE — PROGRESS NOTES
Daily Note     Today's date: 2018  Patient name: Peter Merlin  : 1965  MRN: [id number]  Referring provider: Red Perry MD  Dx:   Encounter Diagnosis     ICD-10-CM    1  Acute medial meniscus tear, left, subsequent encounter S83 242D    2  Status post arthroscopic surgery of left knee Z98 890                   Subjective: Pt reports he continues to have deficits that physical therapy is helping, but feels he is on the right track to hopefully return to work within a few weeks  Objective: See treatment diary below      Assessment: Tolerated treatment well  Patient exhibited good technique with therapeutic exercises  Pt was able to complete progressions of exercises with increased weights  Plan: Continue per plan of care       Precautions: MD protocol for L knee meniscectomy     Daily Treatment Diary     HEP: Sheet provided and discussed     Manual  5/3/18  5/14/18  5/15/18  5/24/18               ART x20'      x15'               IASTM                       JM                       PROM and LE stretch    15'                   STM                          Exercise Diary  5/3/18  5/8/18  5/9/18  5/14/18  5/15/18  5/24/18         TM x10'  10'  10'  10'    10'         Stool Skoots 4x30'  '  "  "    6x30'         DD SLB 6x20''  "  "  "            Standing 1/2 Roll calf stretch 6x20''  20"  20"  "    6x20''         SL Ecc HR 3x10  3/10  3/10  3/10    3x10         HR/TR 3x10  3/10  3/10  3/10             TB TKE 3x10 L4  3/10 L5  3/10 L5  3/10 L5   4x10 L5         TB Heel to Toes 3x10 L3  3/10 L5  3/10 L5  3/10 L5             Forward/backward heel to toe walk P Bars 3x10  3/10                 Sit to stand 3x10  3/10  3/10  SL 3/10             No shoe AE Steamboats 3x10  3/10  3/10  3/10             SL Sit to stand            3x10 high table         SL Bridge            3x10         DL Bosu squat holds            6x25''         Leg press   3pl 3/10  3pl 3/10  5pl 3/10    8pl 3x10         HS curl machine    6pl 3/10  6pl 3/10  7pl 3/10             Upside down BOSU SL squat holds            4x25''         TB Lat Walks 2x10 L4 P bars  2/10 L4  2/10 L4  2/10 L4    3x 60' L4         Step ups/downs 8''/6'' 3x10 ea  8" 3/10 ea   8" 3/10  8" 3/10             HS into QS 3x10  3/10  3/10 into SLR  3/10 into slr             Ankle pumps 3x10  3/10                 QS back and forth 3x10  3/10  3/10  3/10             Prone hip ext 3x10  3/10    3/10             Prone hip IR with TB 3x10 L3  3/10 L3    3/10 L3             Prone hip ER with ball 3x10  3/10    3/10             SLS BOSU      6x25''      Standing HS stretch with Ant pelvic tilt 4x20''    4/20"  4/20"                   Modalities  5/3/18  5/8/18  5/9/18  5/14/18  5/15/18  5/24/18           MHP                       CP x12' 10'  10'  10'    x10'           Stim

## 2018-05-25 ENCOUNTER — OFFICE VISIT (OUTPATIENT)
Dept: PHYSICAL THERAPY | Facility: CLINIC | Age: 53
End: 2018-05-25
Payer: OTHER MISCELLANEOUS

## 2018-05-25 DIAGNOSIS — S83.242D ACUTE MEDIAL MENISCUS TEAR, LEFT, SUBSEQUENT ENCOUNTER: Primary | ICD-10-CM

## 2018-05-25 DIAGNOSIS — Z98.890 STATUS POST ARTHROSCOPIC SURGERY OF LEFT KNEE: ICD-10-CM

## 2018-05-25 PROCEDURE — G8978 MOBILITY CURRENT STATUS: HCPCS | Performed by: PHYSICAL THERAPIST

## 2018-05-25 PROCEDURE — 97110 THERAPEUTIC EXERCISES: CPT | Performed by: PHYSICAL THERAPIST

## 2018-05-25 PROCEDURE — 97530 THERAPEUTIC ACTIVITIES: CPT | Performed by: PHYSICAL THERAPIST

## 2018-05-25 PROCEDURE — 97140 MANUAL THERAPY 1/> REGIONS: CPT | Performed by: PHYSICAL THERAPIST

## 2018-05-25 PROCEDURE — 97010 HOT OR COLD PACKS THERAPY: CPT | Performed by: PHYSICAL THERAPIST

## 2018-05-25 PROCEDURE — G8979 MOBILITY GOAL STATUS: HCPCS | Performed by: PHYSICAL THERAPIST

## 2018-05-25 PROCEDURE — 97112 NEUROMUSCULAR REEDUCATION: CPT | Performed by: PHYSICAL THERAPIST

## 2018-05-25 NOTE — PROGRESS NOTES
PT Re-Evaluation     Today's date: 2018  Patient name: Maureen Sosa  : 1965  MRN: [id number]  Referring provider: Chester Dewey MD  Dx:   Encounter Diagnosis     ICD-10-CM    1  Acute medial meniscus tear, left, subsequent encounter S83 242D    2  Status post arthroscopic surgery of left knee Z98 890                   Assessment  Understanding of Dx/Px/POC: good   Prognosis: good    Goals  STGs: To be complete within 4 weeks  - Decrease pain to < 2/10 at worst (partially met)  - Increase AROM to WNL (met)  - Increase strength to 5/5 (partially met)  - Improve gait to WNL for distances > 1 mile (partially met)    LTGs: To be complete within 6 weeks  - Able to walk for any extended amount of time/distance without pain or limitation for increased safety and functional capacity with ADLs and work-related duty (partially met)  - Able to repetitively squat without pain or limitation for increased safety and functional capacity with ADLs and work-related duty (partially met)  - Able to repetitively ascend/descend a full flight of stairs without pain or limitation for increased safety and functional capacity with ADLs and work-related duty (partially met)  - Able to repetitively complete transfers without pain or limitation for increased safety and functional capacity with ADLs and work-related duty (partially met)  - Able to keel for any extended amount of time without pain or limitation for increased safety and functional capacity with ADLs and work-related duty (partially met)    Plan  Frequency: 3x week  Duration in weeks: 4     Upon completion of today's re-evaluation, as evidenced by present objective and subjective measures, Ancelmo's sx remain consistent with continued, improved paced progress from being s/p L knee meniscectomy 18  Pt will benefit from continued skilled physical therapy to address current deficits      Subjective Evaluation    Pain  Current pain ratin  At best pain ratin  At worst pain ratin  Location: L knee       Pt reports he feels he is making progress with strength, ROM, and overall pain level  Reports his pain level over the past week has been great  Continues to feels he is still making progress with physical therapy  Hoping to return to work within 2 weeks      Objective Tests: Isa's (-)  Pain level ranges 0-4/10  AROM: L knee 0 - 125 degrees (with pain); R knee 0-125 degrees  Strength: L quad and hamstring 4+/5  Gait: Mild to no lateral limp  Incision: Clean and heeling well, no signs/sx of infection (continue to monitor)  Swelling: Mild to none (continue to monitor)  Able to walk at > 75% capacity with mild to no pain and limitation  Able to squat at > 75% capacity with mild to no pain and limitation  Able complete transfers at > 75% capacity with mild to no pain and limitation  Able to ascend/descend stairs at > 75% capacity with mild to pain and limitation  Able to kneel on soft surfaces for 2 min with mild pain and limitation    Flowsheet Rows      Most Recent Value   PT/OT G-Codes   Current Score  70   Projected Score  64   FOTO information reviewed  Yes   Assessment Type  Re-evaluation   G code set  Mobility: Walking & Moving Around   Mobility: Walking and Moving Around Current Status ()  CI          Precautions: MD protocol for L knee meniscectomy     Daily Treatment Diary     HEP: Sheet provided and discussed     Manual  5/3/18  5/14/18  5/15/18  5/25/18               ART x20'      x15'               IASTM                       JM                       PROM and LE stretch    15'                   STM                          Exercise Diary  5/3/18  5/8/18  5/9/18  5/14/18  5/15/18  5/25/18         TM x10'  10'  10'  10'    10'         Stool Skoots 4x30'  4/30'  430"  430"    6x30'         DD SLB 6x20''  /20"  "  "            Standing 1/2 Roll calf stretch 6x20''  /20"  6/20"  20"    6x20''         SL Ecc HR 3x10  3/10  3/10  3/10    3x10         HR/TR 3x10  3/10  3/10  3/10             TB TKE 3x10 L4  3/10 L5  3/10 L5  3/10 L5   4x10 L5         TB Heel to Toes 3x10 L3  3/10 L5  3/10 L5  3/10 L5             Forward/backward heel to toe walk P Bars 3x10  3/10                 Sit to stand 3x10  3/10  3/10  SL 3/10             No shoe AE Steamboats 3x10  3/10  3/10  3/10             SL Sit to stand            3x10 high table         SL Bridge            3x10         DL Bosu squat holds            6x25''         Leg press   3pl 3/10  3pl 3/10  5pl 3/10    8pl 3x10         HS curl machine    6pl 3/10  6pl 3/10  7pl 3/10             Upside down BOSU SL squat holds            4x25''         TB Lat Walks 2x10 L4 P bars  2/10 L4  2/10 L4  2/10 L4    3x 60' L4         Step ups/downs 8''/6'' 3x10 ea  8" 3/10 ea   8" 3/10  8" 3/10             HS into QS 3x10  3/10  3/10 into SLR  3/10 into slr             PB Pull ins          3x10         QS back and forth 3x10  3/10  3/10  3/10             Prone hip ext 3x10  3/10    3/10             Prone hip IR with TB 3x10 L3  3/10 L3    3/10 L3             Prone hip ER with ball 3x10  3/10    3/10             SLS BOSU      6x25''      Standing HS stretch with Ant pelvic tilt 4x20''    4/20"  4/20"                   Modalities  5/3/18  5/8/18  5/9/18  5/14/18  5/15/18  5/25/18           MHP                       CP x12' 10'  10'  10'    x10'           Stim

## 2018-05-25 NOTE — LETTER
May 25, 2018    Lizz Cornelius MD  [address] [place] [address]    Patient: Suzi Gavin  YOB: 1965   Date of Visit: 2018     Encounter Diagnosis     ICD-10-CM    1  Acute medial meniscus tear, left, subsequent encounter S83 242D    2  Status post arthroscopic surgery of left knee Z98 890        Dear Dr Kandy Paz:    Please review the attached Plan of Care from [facility] recent visit  Please verify that you agree therapy should continue by signing the attached document and sending it back to our office  If you have any questions or concerns, please don't hesitate to call  Sincerely,    Agusto Stewart, PT, DPT, ATC, ART      Referring Provider:      I certify that I have read the below Plan of Care and certify the need for these services furnished under this plan of treatment while under my care  Lizz Cornelius MD  [address]: [phone]          PT Re-Evaluation     Today's date: 2018  Patient name: Suzi Gavin  : 1965  MRN: [id number]  Referring provider: Federica Rodriguez MD  Dx:   Encounter Diagnosis     ICD-10-CM    1  Acute medial meniscus tear, left, subsequent encounter S83 242D    2   Status post arthroscopic surgery of left knee Z98 890                   Assessment  Understanding of Dx/Px/POC: good   Prognosis: good    Goals  STGs: To be complete within 4 weeks  - Decrease pain to < 2/10 at worst (partially met)  - Increase AROM to WNL (met)  - Increase strength to 5/5 (partially met)  - Improve gait to WNL for distances > 1 mile (partially met)    LTGs: To be complete within 6 weeks  - Able to walk for any extended amount of time/distance without pain or limitation for increased safety and functional capacity with ADLs and work-related duty (partially met)  - Able to repetitively squat without pain or limitation for increased safety and functional capacity with ADLs and work-related duty (partially met)  - Able to repetitively ascend/descend a full flight of stairs without pain or limitation for increased safety and functional capacity with ADLs and work-related duty (partially met)  - Able to repetitively complete transfers without pain or limitation for increased safety and functional capacity with ADLs and work-related duty (partially met)  - Able to keel for any extended amount of time without pain or limitation for increased safety and functional capacity with ADLs and work-related duty (partially met)    Plan  Frequency: 3x week  Duration in weeks: 4     Upon completion of today's re-evaluation, as evidenced by present objective and subjective measures, Yolies sx remain consistent with continued, improved paced progress from being s/p L knee meniscectomy 18  Pt will benefit from continued skilled physical therapy to address current deficits  Subjective Evaluation    Pain  Current pain ratin  At best pain ratin  At worst pain ratin  Location: L knee       Pt reports he feels he is making progress with strength, ROM, and overall pain level  Reports his pain level over the past week has been great  Continues to feels he is still making progress with physical therapy  Hoping to return to work within 2 weeks      Objective Tests: Isa's (-)  Pain level ranges 0-4/10  AROM: L knee 0 - 125 degrees (with pain); R knee 0-125 degrees  Strength: L quad and hamstring 4+/5  Gait: Mild to no lateral limp  Incision: Clean and heeling well, no signs/sx of infection (continue to monitor)  Swelling: Mild to none (continue to monitor)  Able to walk at > 75% capacity with mild to no pain and limitation  Able to squat at > 75% capacity with mild to no pain and limitation  Able complete transfers at > 75% capacity with mild to no pain and limitation  Able to ascend/descend stairs at > 75% capacity with mild to pain and limitation  Able to kneel on soft surfaces for 2 min with mild pain and limitation    Flowsheet Rows      Most Recent Value   PT/OT G-Codes   Current Score  70   Projected Score  64   FOTO information reviewed  Yes   Assessment Type  Re-evaluation   G code set  Mobility: Walking & Moving Around   Mobility: Walking and Moving Around Current Status ()  CI          Precautions: MD protocol for L knee meniscectomy     Daily Treatment Diary     HEP: Sheet provided and discussed     Manual  5/3/18  5/14/18  5/15/18  5/25/18               ART x20'      x15'               IASTM                       JM                       PROM and LE stretch    15'                   STM                          Exercise Diary  5/3/18  5/8/18  5/9/18  5/14/18  5/15/18  5/25/18         TM x10'  10'  10'  10'    10'         Stool Skoots 4x30'  4/30'  4/30"  4/30"    6x30'         DD SLB 6x20''  6/20"  6/20"  6/20"            Standing 1/2 Roll calf stretch 6x20''  6/20"  6/20"  6/20"    6x20''         SL Ecc HR 3x10  3/10  3/10  3/10    3x10         HR/TR 3x10  3/10  3/10  3/10             TB TKE 3x10 L4  3/10 L5  3/10 L5  3/10 L5   4x10 L5         TB Heel to Toes 3x10 L3  3/10 L5  3/10 L5  3/10 L5             Forward/backward heel to toe walk P Bars 3x10  3/10                 Sit to stand 3x10  3/10  3/10  SL 3/10             No shoe AE Steamboats 3x10  3/10  3/10  3/10             SL Sit to stand            3x10 high table         SL Bridge            3x10         DL Bosu squat holds            6x25''         Leg press   3pl 3/10  3pl 3/10  5pl 3/10    8pl 3x10         HS curl machine    6pl 3/10  6pl 3/10  7pl 3/10             Upside down BOSU SL squat holds            4x25''         TB Lat Walks 2x10 L4 P bars  2/10 L4  2/10 L4  2/10 L4    3x 60' L4         Step ups/downs 8''/6'' 3x10 ea  8" 3/10 ea   8" 3/10  8" 3/10             HS into QS 3x10  3/10  3/10 into SLR  3/10 into slr             PB Pull ins          3x10         QS back and forth 3x10  3/10  3/10  3/10             Prone hip ext 3x10  3/10    3/10             Prone hip IR with TB 3x10 L3  3/10 L3    3/10 L3             Prone hip ER with ball 3x10  3/10    3/10             SLS BOSU      6x25''      Standing HS stretch with Ant pelvic tilt 4x20''    4/20"  4/20"                   Modalities  5/3/18  5/8/18  5/9/18  5/14/18  5/15/18  5/25/18           MHP                       CP x12' 10'  10'  10'    x10'           Stim

## 2018-05-29 ENCOUNTER — OFFICE VISIT (OUTPATIENT)
Dept: PHYSICAL THERAPY | Facility: CLINIC | Age: 53
End: 2018-05-29
Payer: OTHER MISCELLANEOUS

## 2018-05-29 DIAGNOSIS — S83.242D ACUTE MEDIAL MENISCUS TEAR, LEFT, SUBSEQUENT ENCOUNTER: Primary | ICD-10-CM

## 2018-05-29 PROCEDURE — 97112 NEUROMUSCULAR REEDUCATION: CPT

## 2018-05-29 PROCEDURE — 97140 MANUAL THERAPY 1/> REGIONS: CPT

## 2018-05-29 PROCEDURE — 97110 THERAPEUTIC EXERCISES: CPT

## 2018-05-29 NOTE — PROGRESS NOTES
Daily Note     Today's date: 2018  Patient name: Juan Vines  : 1965  MRN: [id number]  Referring provider: Santa Cohen MD  Dx:   Encounter Diagnosis     ICD-10-CM    1  Acute medial meniscus tear, left, subsequent encounter S83 988D                   Subjective: Pt reports he has decreased medication and has had an increase in pain c/o pain along patella  Objective: See treatment diary below      Assessment: Tolerated treatment well  Patient demonstrated fatigue post treatment  Pt able to complete weight progressions with leg press machine completing with notable fatigue however no c/o pain  Pt cont to have c/o pain along patella of L knee and difficulty with kneeling  Plan: Continue per plan of care       Precautions: MD protocol for L knee meniscectomy     Daily Treatment Diary     HEP: Sheet provided and discussed     Manual  5/3/18  5/14/18  5/15/18  5/25/18  5/29/18             ART x20'      x15'               IASTM                       JM                       PROM and LE stretch    15'      15'             STM                          Exercise Diary  5/3/18  5/8/18  5/9/18  5/14/18  5/15/18  5/25/18  5/29/18     TM x10'  10'  10'  10'    10'  10'     Stool Skoots 4x30'  30'  "  "    6x30'  30'     DD SLB 6x20''  20"  20"  620"           Standing 1/2 Roll calf stretch 6x20''  20"  20"  20"    6x20''  20"     SL Ecc HR 3x10  3/10  3/10  3/10    3x10  3/10     HR/TR 3x10  3/10  3/10  3/10      3/10     TB TKE 3x10 L4  3/10 L5  3/10 L5  3/10 L5   4x10 L5 4/10 L5     TB Heel to Toes 3x10 L3  3/10 L5  3/10 L5  3/10 L5      3/10 L5     Forward/backward heel to toe walk P Bars 3x10  3/10               Sit to stand 3x10  3/10  3/10  SL 3/10      SL 3/10     No shoe AE Steamboats 3x10  3/10  3/10  3/10           SL Sit to stand            3x10 high table  3/10     SL Bridge            3x10  3/10     DL Bosu squat holds            6x25''  "     Leg press   3pl 3/10  3pl 3/10  5pl 3/10    8pl 3x10  8pl 3/10     HS curl machine    6pl 3/10  6pl 3/10  7pl 3/10      8 pl 3/10     Upside down BOSU SL squat holds            4x25''  4/25"     TB Lat Walks 2x10 L4 P bars  2/10 L4  2/10 L4  2/10 L4    3x 60' L4  3/60' L4     Step ups/downs 8''/6'' 3x10 ea  8" 3/10 ea   8" 3/10  8" 3/10           HS into QS 3x10  3/10  3/10 into SLR  3/10 into slr           PB Pull ins            3x10  3/10     QS back and forth 3x10  3/10  3/10  3/10           Prone hip ext 3x10  3/10    3/10           Prone hip IR with TB 3x10 L3  3/10 L3    3/10 L3           Prone hip ER with ball 3x10  3/10    3/10           SLS BOSU           6x25''  6/25"     Standing HS stretch with Ant pelvic tilt 4x20''    4/20"  4/20"                 Modalities  5/3/18  5/8/18  5/9/18  5/14/18  5/15/18  5/25/18  5/29/18         MHP                       CP x12' 10'  10'  10'    x10'  10'         Stim

## 2018-05-30 ENCOUNTER — APPOINTMENT (OUTPATIENT)
Dept: PHYSICAL THERAPY | Facility: CLINIC | Age: 53
End: 2018-05-30
Payer: OTHER MISCELLANEOUS

## 2018-05-31 ENCOUNTER — OFFICE VISIT (OUTPATIENT)
Dept: PHYSICAL THERAPY | Facility: CLINIC | Age: 53
End: 2018-05-31
Payer: OTHER MISCELLANEOUS

## 2018-05-31 DIAGNOSIS — Z98.890 STATUS POST ARTHROSCOPIC SURGERY OF LEFT KNEE: ICD-10-CM

## 2018-05-31 DIAGNOSIS — S83.242D ACUTE MEDIAL MENISCUS TEAR, LEFT, SUBSEQUENT ENCOUNTER: Primary | ICD-10-CM

## 2018-05-31 PROCEDURE — 97110 THERAPEUTIC EXERCISES: CPT

## 2018-05-31 PROCEDURE — 97112 NEUROMUSCULAR REEDUCATION: CPT

## 2018-05-31 PROCEDURE — 97140 MANUAL THERAPY 1/> REGIONS: CPT

## 2018-05-31 NOTE — PROGRESS NOTES
Daily Note     Today's date: 2018  Patient name: Linda Nails  : 1965  MRN: [id number]  Referring provider: Nicolette Zavala MD  Dx:   Encounter Diagnosis     ICD-10-CM    1  Acute medial meniscus tear, left, subsequent encounter Z55 162D    2  Status post arthroscopic surgery of left knee Z98 890                   Subjective: Pt reports cont pain underneath patella  Reports he had reduced medicine and cont to have sx  Reports he will see MD next week  Objective: See treatment diary below      Assessment: Tolerated treatment well  Patient demonstrated fatigue post treatment  Pt cont to demo improved strength and rom however cont to have pain in patellar region  Pt cont to progress with current program        Plan: Continue per plan of care  MD next week          Precautions: MD protocol for L knee meniscectomy     Daily Treatment Diary     HEP: Sheet provided and discussed     Manual  5/3/18  5/14/18  5/15/18  5/25/18  5/29/18 5/31/18    ART x20'      x15'       IASTM               JM               PROM and LE stretch    15'      15'  15'   STM                  Exercise Diary  5/3/18  5/8/18  5/9/18  5/14/18  5/15/18  5/25/18  5/31/18   TM x10'  10'  10'  10'    10'  10'   Stool Skoots 4x30'  '  "  "    6x30'  '   DD SLB 6x20''  "  "  "      20"   Standing 1/2 Roll calf stretch 6x20''  20"  "  "    6x20''  "   SL Ecc HR 3x10  3/10  3/10  3/10    3x10  3/10   HR/TR 3x10  3/10  3/10  3/10      3/10   TB TKE 3x10 L4  3/10 L5  3/10 L5  3/10 L5   4x10 L5 4/10 L5   TB Heel to Toes 3x10 L3  3/10 L5  3/10 L5  3/10 L5      3/10 L5   Forward/backward heel to toe walk P Bars 3x10  3/10             Sit to stand 3x10  3/10  3/10  SL 3/10      SL 3/10   No shoe AE Steamboats 3x10  3/10  3/10  3/10         SL Sit to stand            3x10 high table  3/10   SL Bridge            3x10  3/10   DL Bosu squat holds            6x25''  "   Leg press   3pl 3/10  3pl 3/10  5pl 3/10    8pl 3x10  8pl 3/10   HS curl machine    6pl 3/10  6pl 3/10  7pl 3/10      10 pl 3/10   Upside down BOSU SL squat holds            4x25''  4/25"   TB Lat Walks 2x10 L4 P bars  2/10 L4  2/10 L4  2/10 L4    3x 60' L4  3/60' L4   Step ups/downs 8''/6'' 3x10 ea  8" 3/10 ea   8" 3/10  8" 3/10         HS into QS 3x10  3/10  3/10 into SLR  3/10 into slr         PB Pull ins            3x10  3/10   QS back and forth 3x10  3/10  3/10  3/10         Prone hip ext 3x10  3/10    3/10         Prone hip IR with TB 3x10 L3  3/10 L3    3/10 L3         Prone hip ER with ball 3x10  3/10    3/10         SLS BOSU           6x25''  6/25"   Standing HS stretch with Ant pelvic tilt 4x20''    4/20"  4/20"               Modalities  5/3/18  5/8/18  5/9/18  5/14/18  5/15/18  5/25/18  5/29/18         MHP                       CP x12' 10'  10'  10'    x10'  10'         Stim

## 2018-06-04 ENCOUNTER — OFFICE VISIT (OUTPATIENT)
Dept: PHYSICAL THERAPY | Facility: CLINIC | Age: 53
End: 2018-06-04
Payer: OTHER MISCELLANEOUS

## 2018-06-04 DIAGNOSIS — Z98.890 STATUS POST ARTHROSCOPIC SURGERY OF LEFT KNEE: ICD-10-CM

## 2018-06-04 DIAGNOSIS — S83.242D ACUTE MEDIAL MENISCUS TEAR, LEFT, SUBSEQUENT ENCOUNTER: Primary | ICD-10-CM

## 2018-06-04 PROCEDURE — 97140 MANUAL THERAPY 1/> REGIONS: CPT

## 2018-06-04 PROCEDURE — 97112 NEUROMUSCULAR REEDUCATION: CPT

## 2018-06-04 PROCEDURE — 97110 THERAPEUTIC EXERCISES: CPT

## 2018-06-04 NOTE — PROGRESS NOTES
Daily Note     Today's date: 2018  Patient name: Nalini Denton  : 1965  MRN: [id number]  Referring provider: Arnoldo Card MD  Dx:   Encounter Diagnosis     ICD-10-CM    1  Acute medial meniscus tear, left, subsequent encounter S83 242D    2  Status post arthroscopic surgery of left knee Z98 890                   Subjective: Pt reports he is doing fair with cont pain around patella  Reports both good and bad days  States he will see MD on Thursday with possible RTW  Objective: See treatment diary below      Assessment: Tolerated treatment well  Patient demonstrated fatigue post treatment  Pt cont to demonstrate overall good demonstration of strength and rom with current program  Pt with only c/o fatigue and pain intermittently around L patellar area  Plan: Continue per plan of care         Precautions: MD protocol for L knee meniscectomy     Daily Treatment Diary     HEP: Sheet provided and discussed     Manual  6/4/18  5/14/18  5/15/18  5/25/18  5/29/18 5/31/18    ART       x15'       IASTM               JM               PROM and LE stretch  15'  15'      15'  15'   STM                  Exercise Diary  6/4/18  5/8/18  5/9/18  5/14/18  5/15/18  5/25/18  5/31/18   TM x10'  10'  10'  10'    10'  10'   Stool Skoots 4x30'  '  "  "    6x30'  '   DD SLB 6x20''  "  "  "      "   Standing 1/2 Roll calf stretch 6x20''  20"  "  "    6x20''  "   SL Ecc HR 3x10  3/10  3/10  3/10    3x10  3/10   HR/TR 3x10  3/10  3/10  3/10      3/10   TB TKE 3x10 L5  3/10 L5  3/10 L5  3/10 L5   4x10 L5 4/10 L5   TB Heel to Toes 3x10 L5  3/10 L5  3/10 L5  3/10 L5      3/10 L5   Forward/backward heel to toe walk P Bars   3/10             Sit to stand SL 3/10  3/10  3/10  SL 3/10      SL 3/10   No shoe AE Steamboats   3/10  3/10  3/10         SL Sit to stand  3/10          3x10 high table  3/10   SL Bridge  3/10          3x10  3/10   DL Bosu squat holds  "          7L20''  6/25"   Leg press  8pl 3/10 3pl 3/10  3pl 3/10  5pl 3/10    8pl 3x10  8pl 3/10   HS curl machine 10pl 3/10  6pl 3/10  6pl 3/10  7pl 3/10      10 pl 3/10   Upside down BOSU SL squat holds  4/25''          4x25''  4/25"   TB Lat Walks L4 3/60'  2/10 L4  2/10 L4  2/10 L4    3x 61' L4  3/60' L4   Step ups/downs   8" 3/10 ea   8" 3/10  8" 3/10         HS into QS   3/10  3/10 into SLR  3/10 into slr         PB Pull ins  3/10          3x10  3/10   QS back and forth   3/10  3/10  3/10         Prone hip ext   3/10    3/10         Prone hip IR with TB   3/10 L3    3/10 L3         Prone hip ER with ball   3/10    3/10         SLS BOSU  6/25"         6x25''  6/25"   Standing HS stretch with Ant pelvic tilt     4/20"  4/20"               Modalities  6/4/18  5/8/18  5/9/18  5/14/18  5/15/18  5/25/18  5/29/18   MHP                 CP  10'  10'  10'    x10'  10'   Stim

## 2018-06-06 ENCOUNTER — OFFICE VISIT (OUTPATIENT)
Dept: PHYSICAL THERAPY | Facility: CLINIC | Age: 53
End: 2018-06-06
Payer: OTHER MISCELLANEOUS

## 2018-06-06 DIAGNOSIS — S83.242D ACUTE MEDIAL MENISCUS TEAR, LEFT, SUBSEQUENT ENCOUNTER: Primary | ICD-10-CM

## 2018-06-06 DIAGNOSIS — Z98.890 STATUS POST ARTHROSCOPIC SURGERY OF LEFT KNEE: ICD-10-CM

## 2018-06-06 PROCEDURE — 97112 NEUROMUSCULAR REEDUCATION: CPT | Performed by: PHYSICAL THERAPIST

## 2018-06-06 PROCEDURE — 97110 THERAPEUTIC EXERCISES: CPT | Performed by: PHYSICAL THERAPIST

## 2018-06-06 PROCEDURE — 97140 MANUAL THERAPY 1/> REGIONS: CPT | Performed by: PHYSICAL THERAPIST

## 2018-06-06 PROCEDURE — 97530 THERAPEUTIC ACTIVITIES: CPT | Performed by: PHYSICAL THERAPIST

## 2018-06-06 PROCEDURE — 97010 HOT OR COLD PACKS THERAPY: CPT | Performed by: PHYSICAL THERAPIST

## 2018-06-06 NOTE — PROGRESS NOTES
Daily Note     Today's date: 2018  Patient name: Rebecca Jimenez  : 1965  MRN: [id number]  Referring provider: Fatmata Flynn MD  Dx:   Encounter Diagnosis     ICD-10-CM    1  Acute medial meniscus tear, left, subsequent encounter S83 242D    2  Status post arthroscopic surgery of left knee Z98 [id number]                   Subjective: Pt reports he continues to see progress  Pain level will intermittently increase with increased use, however feels he has a good handle on how to manage that  Reports he is becoming more and more independent with HEP  Hoping to transition to HEP soon  Objective: See treatment diary below      Assessment: Tolerated treatment well  Patient demonstrated fatigue post treatment  Pt cont to demonstrate overall good demonstration of strength and rom with current program  Pt with only c/o fatigue and pain intermittently around L patellar area  Plan: Continue per plan of care         Precautions: MD protocol for L knee meniscectomy     Daily Treatment Diary     HEP: Sheet provided and discussed     Manual  6/6/18  5/14/18  5/15/18  5/25/18  5/29/18 5/31/18    ART       x15'       IASTM               JM               PROM and LE stretch  15'  15'      15'  15'   STM                  Exercise Diary  6/6/18  5/8/18  5/9/18  5/14/18  5/15/18  5/25/18  5/31/18   TM x10'  10'  10'  10'    10'  10'   Stool Skoots 4x30'  '  "  "    6x30'  '   DD SLB 6x20''  "  "  "      "   Standing 1/2 Roll calf stretch 6x20''  "  "  "    6x20''  "   SL Ecc HR 3x10  3/10  3/10  3/10    3x10  3/10   HR/TR 3x10  3/10  3/10  3/10      3/10   TB TKE 3x10 L5  3/10 L5  3/10 L5  3/10 L5   4x10 L5 4/10 L5   TB Heel to Toes 3x10 L5  3/10 L5  3/10 L5  3/10 L5      3/10 L5   Forward/backward heel to toe walk P Bars   3/10             Sit to stand SL 3/10  3/10  3/10  SL 3/10      SL 3/10   No shoe AE Steamboats   3/10  3/10  3/10         SL Sit to stand  3/10          3x10 high table  3/10   SL Bridge  3/10          3x10  3/10   DL Bosu squat holds  6/25"          6x25''  6/25"   Leg press  8pl 3/10 3pl 3/10  3pl 3/10  5pl 3/10    8pl 3x10  8pl 3/10   HS curl machine 10pl 3/10  6pl 3/10  6pl 3/10  7pl 3/10      10 pl 3/10   Upside down BOSU SL squat holds  4/25''          4x25''  4/25"   TB Lat Walks L4 3/60'  2/10 L4  2/10 L4  2/10 L4    3x 61' L4  3/60' L4   Step ups/downs   8" 3/10 ea   8" 3/10  8" 3/10         HS into QS   3/10  3/10 into SLR  3/10 into slr         PB Pull ins  3/10          3x10  3/10   QS back and forth   3/10  3/10  3/10         Prone hip ext   3/10    3/10         Prone hip IR with TB   3/10 L3    3/10 L3         Prone hip ER with ball   3/10    3/10         SLS BOSU  6/25"         6x25''  6/25"   Standing HS stretch with Ant pelvic tilt     4/20"  4/20"               Modalities  6/6/18  5/8/18  5/9/18  5/14/18  5/15/18  5/25/18  5/29/18   MHP                 CP  10'  10'  10'    x10'  10'   Stim

## 2018-06-08 ENCOUNTER — APPOINTMENT (OUTPATIENT)
Dept: PHYSICAL THERAPY | Facility: CLINIC | Age: 53
End: 2018-06-08
Payer: OTHER MISCELLANEOUS

## 2018-06-11 ENCOUNTER — APPOINTMENT (OUTPATIENT)
Dept: PHYSICAL THERAPY | Facility: CLINIC | Age: 53
End: 2018-06-11
Payer: OTHER MISCELLANEOUS

## 2018-06-12 ENCOUNTER — APPOINTMENT (OUTPATIENT)
Dept: PHYSICAL THERAPY | Facility: CLINIC | Age: 53
End: 2018-06-12
Payer: OTHER MISCELLANEOUS

## 2018-06-13 DIAGNOSIS — G43.909 MIGRAINE WITHOUT STATUS MIGRAINOSUS, NOT INTRACTABLE, UNSPECIFIED MIGRAINE TYPE: Primary | ICD-10-CM

## 2018-06-13 RX ORDER — SUMATRIPTAN 50 MG/1
TABLET, FILM COATED ORAL
Qty: 9 TABLET | Refills: 0 | Status: SHIPPED | OUTPATIENT
Start: 2018-06-13 | End: 2019-01-16 | Stop reason: SDUPTHER

## 2018-08-10 ENCOUNTER — OFFICE VISIT (OUTPATIENT)
Dept: FAMILY MEDICINE CLINIC | Facility: CLINIC | Age: 53
End: 2018-08-10
Payer: COMMERCIAL

## 2018-08-10 VITALS
BODY MASS INDEX: 37.82 KG/M2 | SYSTOLIC BLOOD PRESSURE: 124 MMHG | DIASTOLIC BLOOD PRESSURE: 68 MMHG | WEIGHT: 264.2 LBS | HEIGHT: 70 IN

## 2018-08-10 DIAGNOSIS — R31.21 ASYMPTOMATIC MICROSCOPIC HEMATURIA: Primary | ICD-10-CM

## 2018-08-10 PROCEDURE — 99213 OFFICE O/P EST LOW 20 MIN: CPT | Performed by: FAMILY MEDICINE

## 2018-08-10 RX ORDER — MELOXICAM 7.5 MG/1
7.5 TABLET ORAL DAILY
COMMUNITY
End: 2019-02-08

## 2018-08-10 NOTE — PROGRESS NOTES
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 asymptomatic needs a workup for same      Blood in Urine   Irritative symptoms do not include urgency  Pertinent negatives include no abdominal pain, dysuria, fever, flank pain, nausea or vomiting  The following portions of the patient's history were reviewed and updated as appropriate:   He  has a past medical history of Bee allergy status; Benign lipomatous neoplasm; Cellulitis of mid back region; High risk medication use; Internal derangement of knee, left; Pelvic cramping; Prostatism; and Sebaceous cyst   He   Patient Active Problem List    Diagnosis Date Noted    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    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    Knee pain 04/27/2018    Osteoarthritis 04/27/2018    Osteoarthritis of hip 04/27/2018    Osteoarthritis of knee 04/27/2018    Tenosynovitis 04/27/2018    Shoulder joint pain 04/27/2018    Pain in elbow 04/27/2018    Injury of wrist 04/27/2018    Hip pain 04/27/2018    Enthesopathy of wrist 04/27/2018    Disorder of wrist joint 04/27/2018    Disorder of shoulder 04/27/2018    Tear of lateral cartilage or meniscus of knee, current 04/27/2018    Disorder of bursae of shoulder region 04/27/2018    Articular cartilage disorder of wrist 04/27/2018     He  has a past surgical history that includes ARTHROSCOPY ELBOW (Left, 2003); ARTHROSCOPY KNEE (Right); Shoulder arthroscopy (Left, 2003);  Shoulder arthroscopy (Right, 04/2012); TONSILLECTOMY; Wrist surgery (Left); Knee surgery; and Hernia repair  His family history includes Asthma in his mother; Diabetes type II in his maternal grandfather  He  reports that he has never smoked  He quit smokeless tobacco use about 8 years ago  He reports that he drinks alcohol  He reports that he does not use drugs  Current Outpatient Prescriptions   Medication Sig Dispense Refill    albuterol (VENTOLIN HFA) 90 mcg/act inhaler Inhale 2 puffs every 4 (four) hours as needed for wheezing or shortness of breath 1 Inhaler 0    EPINEPHrine (EPIPEN 2-HOANG) 0 3 mg/0 3 mL SOAJ Inject as directed Twice daily      meloxicam (MOBIC) 7 5 mg tablet Take 7 5 mg by mouth daily      Multiple Vitamins-Minerals (PX MENS MULTIVITAMINS) TABS Take 1 tablet by mouth daily      SUMAtriptan (IMITREX) 50 mg tablet Take 1 tablet by mouth for migraine relief, may repeat every 2 hours max 200mg 9 tablet 0     No current facility-administered medications for this visit  Current Outpatient Prescriptions on File Prior to Visit   Medication Sig    albuterol (VENTOLIN HFA) 90 mcg/act inhaler Inhale 2 puffs every 4 (four) hours as needed for wheezing or shortness of breath    EPINEPHrine (EPIPEN 2-HOANG) 0 3 mg/0 3 mL SOAJ Inject as directed Twice daily    Multiple Vitamins-Minerals (PX MENS MULTIVITAMINS) TABS Take 1 tablet by mouth daily    SUMAtriptan (IMITREX) 50 mg tablet Take 1 tablet by mouth for migraine relief, may repeat every 2 hours max 200mg    [DISCONTINUED] benzonatate (TESSALON PERLES) 100 mg capsule Take 1 capsule (100 mg total) by mouth 3 (three) times a day as needed for cough     No current facility-administered medications on file prior to visit  He is allergic to bee venom; other; and contrast  [iodinated diagnostic agents]       Review of Systems   Constitutional: Negative for activity change, appetite change, diaphoresis, fatigue and fever  HENT: Negative  Eyes: Negative      Respiratory: Negative for apnea, cough, chest tightness, shortness of breath and wheezing  Cardiovascular: Negative for chest pain, palpitations and leg swelling  Gastrointestinal: Negative for abdominal distention, abdominal pain, anal bleeding, constipation, diarrhea, nausea and vomiting  Endocrine: Negative for cold intolerance, heat intolerance, polydipsia, polyphagia and polyuria  Genitourinary: Positive for hematuria  Negative for difficulty urinating, dysuria, flank pain and urgency  Musculoskeletal: Negative for arthralgias, back pain, gait problem, joint swelling and myalgias  Skin: Negative for color change, rash and wound  Allergic/Immunologic: Negative for environmental allergies, food allergies and immunocompromised state  Neurological: Negative for dizziness, seizures, syncope, speech difficulty, numbness and headaches  Hematological: Negative for adenopathy  Does not bruise/bleed easily  Psychiatric/Behavioral: Negative for agitation, behavioral problems, hallucinations, sleep disturbance and suicidal ideas  Objective:      /68   Ht 5' 10" (1 778 m)   Wt 120 kg (264 lb 3 2 oz)   BMI 37 91 kg/m²          Physical Exam   Constitutional: He is oriented to person, place, and time  He appears well-developed and well-nourished  No distress  HENT:   Head: Normocephalic  Right Ear: External ear normal    Left Ear: External ear normal    Nose: Nose normal    Mouth/Throat: Oropharynx is clear and moist    Eyes: Conjunctivae and EOM are normal  Pupils are equal, round, and reactive to light  Right eye exhibits no discharge  Left eye exhibits no discharge  No scleral icterus  Neck: Normal range of motion  No tracheal deviation present  No thyromegaly present  Cardiovascular: Normal rate, regular rhythm and normal heart sounds  Exam reveals no gallop and no friction rub  No murmur heard  Pulmonary/Chest: Effort normal and breath sounds normal  No respiratory distress  He has no wheezes     Abdominal: Soft  Bowel sounds are normal  He exhibits no mass  There is no tenderness  There is no guarding  Musculoskeletal: He exhibits no edema or deformity  Lymphadenopathy:     He has no cervical adenopathy  Neurological: He is alert and oriented to person, place, and time  No cranial nerve deficit  Skin: Skin is warm and dry  No rash noted  He is not diaphoretic  No erythema  Psychiatric: He has a normal mood and affect   Thought content normal

## 2018-08-13 ENCOUNTER — TRANSCRIBE ORDERS (OUTPATIENT)
Dept: LAB | Facility: MEDICAL CENTER | Age: 53
End: 2018-08-13

## 2018-08-13 ENCOUNTER — LAB (OUTPATIENT)
Dept: LAB | Facility: MEDICAL CENTER | Age: 53
End: 2018-08-13
Payer: COMMERCIAL

## 2018-08-13 ENCOUNTER — CLINICAL SUPPORT (OUTPATIENT)
Dept: FAMILY MEDICINE CLINIC | Facility: CLINIC | Age: 53
End: 2018-08-13
Payer: COMMERCIAL

## 2018-08-13 DIAGNOSIS — R31.21 ASYMPTOMATIC MICROSCOPIC HEMATURIA: Primary | ICD-10-CM

## 2018-08-13 DIAGNOSIS — Z11.1 PPD SCREENING TEST: Primary | ICD-10-CM

## 2018-08-13 LAB
BACTERIA UR QL AUTO: NORMAL /HPF
BILIRUB UR QL STRIP: NEGATIVE
CLARITY UR: ABNORMAL
COLOR UR: ABNORMAL
GLUCOSE UR STRIP-MCNC: NEGATIVE MG/DL
HGB UR QL STRIP.AUTO: ABNORMAL
HYALINE CASTS #/AREA URNS LPF: NORMAL /LPF
KETONES UR STRIP-MCNC: NEGATIVE MG/DL
LEUKOCYTE ESTERASE UR QL STRIP: NEGATIVE
NITRITE UR QL STRIP: NEGATIVE
NON-SQ EPI CELLS URNS QL MICRO: NORMAL /HPF
PH UR STRIP.AUTO: 5.5 [PH] (ref 4.5–8)
PROT UR STRIP-MCNC: NEGATIVE MG/DL
RBC #/AREA URNS AUTO: NORMAL /HPF
SP GR UR STRIP.AUTO: 1.02 (ref 1–1.03)
UROBILINOGEN UR QL STRIP.AUTO: 0.2 E.U./DL
WBC #/AREA URNS AUTO: NORMAL /HPF

## 2018-08-13 PROCEDURE — 88112 CYTOPATH CELL ENHANCE TECH: CPT | Performed by: PATHOLOGY

## 2018-08-13 PROCEDURE — 86580 TB INTRADERMAL TEST: CPT

## 2018-08-13 PROCEDURE — 81001 URINALYSIS AUTO W/SCOPE: CPT | Performed by: FAMILY MEDICINE

## 2018-08-14 ENCOUNTER — TELEPHONE (OUTPATIENT)
Dept: UROLOGY | Facility: MEDICAL CENTER | Age: 53
End: 2018-08-14

## 2018-08-14 DIAGNOSIS — R31.29 MICROSCOPIC HEMATURIA: Primary | ICD-10-CM

## 2018-08-14 NOTE — TELEPHONE ENCOUNTER
Preferred Location- [place] (No appts available), Carlos Manuel  Complaint/Diagnosis- Microscopic Hematuria  Insurance- HBS  History of Cancer- No  Previous Urologist- Pt stated when he was 4yo he went to urologist for same problem, did not remember name/location  Outside testing- Not for situation  Records Request- No    KT

## 2018-08-14 NOTE — PROGRESS NOTES
Please call the patient regarding his abnormal result   Patient still has a small amount of blood in the urine recommend following through on the CT scan of the kidneys ureter and bladder and also setting patient up with Dr Luanne lira for an evaluation of his bladder due to his history of tobacco use which is a risk factor for urinary bladder cancer but the consultation on with Dr Luanne lira

## 2018-08-15 LAB
INDURATION: 0 MM
TB SKIN TEST: NEGATIVE

## 2018-08-16 ENCOUNTER — OFFICE VISIT (OUTPATIENT)
Dept: UROLOGY | Facility: CLINIC | Age: 53
End: 2018-08-16
Payer: COMMERCIAL

## 2018-08-16 VITALS
BODY MASS INDEX: 37.22 KG/M2 | HEIGHT: 70 IN | WEIGHT: 260 LBS | SYSTOLIC BLOOD PRESSURE: 128 MMHG | DIASTOLIC BLOOD PRESSURE: 80 MMHG

## 2018-08-16 DIAGNOSIS — Z12.5 ENCOUNTER FOR PROSTATE CANCER SCREENING: ICD-10-CM

## 2018-08-16 DIAGNOSIS — R39.89 BLADDER PAIN: Primary | ICD-10-CM

## 2018-08-16 DIAGNOSIS — R31.29 MICROSCOPIC HEMATURIA: ICD-10-CM

## 2018-08-16 LAB
SL AMB  POCT GLUCOSE, UA: NEGATIVE
SL AMB LEUKOCYTE ESTERASE,UA: ABNORMAL
SL AMB POCT BILIRUBIN,UA: NEGATIVE
SL AMB POCT BLOOD,UA: NEGATIVE
SL AMB POCT CLARITY,UA: CLEAR
SL AMB POCT COLOR,UA: YELLOW
SL AMB POCT KETONES,UA: NEGATIVE
SL AMB POCT NITRITE,UA: NEGATIVE
SL AMB POCT PH,UA: 6
SL AMB POCT SPECIFIC GRAVITY,UA: 1.02
SL AMB POCT URINE PROTEIN: NEGATIVE
SL AMB POCT UROBILINOGEN: 0.2

## 2018-08-16 PROCEDURE — 99204 OFFICE O/P NEW MOD 45 MIN: CPT | Performed by: UROLOGY

## 2018-08-16 PROCEDURE — 81003 URINALYSIS AUTO W/O SCOPE: CPT | Performed by: UROLOGY

## 2018-08-16 RX ORDER — CIPROFLOXACIN 500 MG/1
500 TABLET, FILM COATED ORAL EVERY 12 HOURS SCHEDULED
Qty: 14 TABLET | Refills: 0 | Status: SHIPPED | OUTPATIENT
Start: 2018-08-16 | End: 2018-08-23

## 2018-08-16 NOTE — PROGRESS NOTES
[address] [facility]  [facility]  [address] [facility]  [place], [address] [facility]  [phone]  www  [facility]  org      NAME: Sho Valdes  AGE: 46 y o  SEX: male  : 1965   MRN: [id number]    DATE: 2018  TIME: 10:02 AM    Assessment and Plan:  Suprapubic pain, unknown etiology  Episode of microscopic hematuria, but the last couple of urinalyses have had no blood  Cytology negative  Will see what shows up on the CT scan and check a PSA blood test   Since that work last time, we will prescribe him Cipro 500 mg p o  b i d  for 7 days  We will see him for cystoscopy in about 4 weeks  Chief Complaint     Chief Complaint   Patient presents with    Microhematuria       History of Present Illness   New patient office visit:  Here for microscopic hematuria and bladder pain  He saw [facility] and [address] [place] [address] for a similar episode 2 years ago  The episode is described as pain in the bladder region and he had microscopic hematuria at that time 2 years ago when he had a similar episode  He was prescribed an antibiotic at that time and helped  Little over 2 weeks ago, he had started developing some bladder discomfort which was not very bad and he had C dL physical   Microscopic hematuria was seen on the urinalysis and he is due for a CT scan on Monday  Since that physical the pain is become worse  His urinalysis today is negative  He is having no voiding symptoms  Urine cytology was ordered and this shows no cancer cells  He served in the [employer] as a    He just retired as a   The following portions of the patient's history were reviewed and updated as appropriate: allergies, current medications, past family history, past medical history, past social history, past surgical history and problem list     Review of Systems   Review of Systems   Genitourinary: Negative  Active Problem List     Patient Active Problem List   Diagnosis    Chondromalacia    Kienbock's disease of adults    Knee pain    Osteoarthritis    Osteoarthritis of hip    Osteoarthritis of knee    Tenosynovitis    Shoulder joint pain    Pain in elbow    Injury of wrist    Hip pain    Enthesopathy of wrist    Disorder of wrist joint    Disorder of shoulder    Tear of lateral cartilage or meniscus of knee, current    Disorder of bursae of shoulder region    Articular cartilage disorder of wrist       Objective   /80   Ht 5' 10" (1 778 m)   Wt 118 kg (260 lb)   BMI 37 31 kg/m²     Physical Exam   Constitutional: He is oriented to person, place, and time  He appears well-developed and well-nourished  HENT:   Head: Normocephalic and atraumatic  Eyes: EOM are normal    Neck: Normal range of motion  Pulmonary/Chest: Effort normal    Abdominal: Soft  He exhibits no distension and no mass  There is no tenderness  There is no rebound and no guarding  Genitourinary: Rectum normal, prostate normal and penis normal    Genitourinary Comments: Rectal exam reveals normal tone no masses and his prostate is about 40 g, smooth symmetric and benign  No tenderness  Testicles are descended bilaterally and are within normal limits  No inguinal hernia palpated  There is some tenderness when you push in to the mons pubis just above the pubic bone  Musculoskeletal: Normal range of motion  Neurological: He is alert and oriented to person, place, and time  Skin: Skin is warm and dry  Psychiatric: He has a normal mood and affect   His behavior is normal  Judgment and thought content normal            Current Medications     Current Outpatient Prescriptions:     albuterol (VENTOLIN HFA) 90 mcg/act inhaler, Inhale 2 puffs every 4 (four) hours as needed for wheezing or shortness of breath, Disp: 1 Inhaler, Rfl: 0    meloxicam (MOBIC) 7 5 mg tablet, Take 7 5 mg by mouth daily, Disp: , Rfl:    Multiple Vitamins-Minerals (PX MENS MULTIVITAMINS) TABS, Take 1 tablet by mouth daily, Disp: , Rfl:     SUMAtriptan (IMITREX) 50 mg tablet, Take 1 tablet by mouth for migraine relief, may repeat every 2 hours max 200mg, Disp: 9 tablet, Rfl: 0    EPINEPHrine (EPIPEN 2-HOANG) 0 3 mg/0 3 mL SOAJ, Inject as directed Twice daily, Disp: , Rfl:         Javan Adames MD

## 2018-08-16 NOTE — PATIENT INSTRUCTIONS
prescription at pharmacy and have PSA blood test done    No sexual activity for 3 days prior to the PSA blood test

## 2018-08-16 NOTE — LETTER
2018     Chiquita Ospina, DO  [address] [facility] [id number]    Patient: Brea Garcia  YOB: 1965   Date of Visit: 2018       Dear Dr Dionna Haque: Thank you for referring David Gordon to me for evaluation  Below are my notes for this consultation  If you have questions, please do not hesitate to call me  I look forward to following your patient along with you  Sincerely,        Dianne Ramirez MD        CC: No Recipients  Dianne Ramirez MD  2018 10:18 AM  Sign at close encounter  [address] [facility]  [address], [address] [place] [id number]  www  [facility]  org      NAME: Brea Garcia  AGE: 46 y o  SEX: male  : 1965   MRN: [id number]    DATE: 2018  TIME: 10:02 AM    Assessment and Plan:  Suprapubic pain, unknown etiology  Episode of microscopic hematuria, but the last couple of urinalyses have had no blood  Cytology negative  Will see what shows up on the CT scan and check a PSA blood test   Since that work last time, we will prescribe him Cipro 500 mg p o  b i d  for 7 days  We will see him for cystoscopy in about 4 weeks  Chief Complaint     Chief Complaint   Patient presents with    Microhematuria       History of Present Illness   New patient office visit:  Here for microscopic hematuria and bladder pain  He saw [facility]u [facility] of Urology and [place] for a similar episode 2 years ago  The episode is described as pain in the bladder region and he had microscopic hematuria at that time 2 years ago when he had a similar episode  He was prescribed an antibiotic at that time and helped  Little over 2 weeks ago, he had started developing some bladder discomfort which was not very bad and he had C dL physical   Microscopic hematuria was seen on the urinalysis and he is due for a CT scan on Monday    Since that physical the pain is become worse   His urinalysis today is negative  He is having no voiding symptoms  Urine cytology was ordered and this shows no cancer cells  He served in the [employer] as a    He just retired as a   The following portions of the patient's history were reviewed and updated as appropriate: allergies, current medications, past family history, past medical history, past social history, past surgical history and problem list     Review of Systems   Review of Systems   Genitourinary: Negative  Active Problem List     Patient Active Problem List   Diagnosis    Chondromalacia    Kienbock's disease of adults    Knee pain    Osteoarthritis    Osteoarthritis of hip    Osteoarthritis of knee    Tenosynovitis    Shoulder joint pain    Pain in elbow    Injury of wrist    Hip pain    Enthesopathy of wrist    Disorder of wrist joint    Disorder of shoulder    Tear of lateral cartilage or meniscus of knee, current    Disorder of bursae of shoulder region    Articular cartilage disorder of wrist       Objective   /80   Ht 5' 10" (1 778 m)   Wt 118 kg (260 lb)   BMI 37 31 kg/m²      Physical Exam   Constitutional: He is oriented to person, place, and time  He appears well-developed and well-nourished  HENT:   Head: Normocephalic and atraumatic  Eyes: EOM are normal    Neck: Normal range of motion  Pulmonary/Chest: Effort normal    Abdominal: Soft  He exhibits no distension and no mass  There is no tenderness  There is no rebound and no guarding  Genitourinary: Rectum normal, prostate normal and penis normal    Genitourinary Comments: Rectal exam reveals normal tone no masses and his prostate is about 40 g, smooth symmetric and benign  No tenderness  Testicles are descended bilaterally and are within normal limits  No inguinal hernia palpated  There is some tenderness when you push in to the mons pubis just above the pubic bone  Musculoskeletal: Normal range of motion  Neurological: He is alert and oriented to person, place, and time  Skin: Skin is warm and dry  Psychiatric: He has a normal mood and affect   His behavior is normal  Judgment and thought content normal            Current Medications     Current Outpatient Prescriptions:     albuterol (VENTOLIN HFA) 90 mcg/act inhaler, Inhale 2 puffs every 4 (four) hours as needed for wheezing or shortness of breath, Disp: 1 Inhaler, Rfl: 0    meloxicam (MOBIC) 7 5 mg tablet, Take 7 5 mg by mouth daily, Disp: , Rfl:     Multiple Vitamins-Minerals (PX MENS MULTIVITAMINS) TABS, Take 1 tablet by mouth daily, Disp: , Rfl:     SUMAtriptan (IMITREX) 50 mg tablet, Take 1 tablet by mouth for migraine relief, may repeat every 2 hours max 200mg, Disp: 9 tablet, Rfl: 0    EPINEPHrine (EPIPEN 2-HOANG) 0 3 mg/0 3 mL SOAJ, Inject as directed Twice daily, Disp: , Rfl:         Myranda Rivers MD

## 2018-08-17 NOTE — PROGRESS NOTES
Script was dropped to [address] and never reached the pharmacy  I called the script in for Cipro 500mg 1 PO BID for 7 days #14 with NO refills  Order given to Kamla Bradford  Ph

## 2018-08-20 ENCOUNTER — HOSPITAL ENCOUNTER (OUTPATIENT)
Dept: CT IMAGING | Facility: HOSPITAL | Age: 53
Discharge: HOME | End: 2018-08-20
Attending: FAMILY MEDICINE
Payer: COMMERCIAL

## 2018-08-20 ENCOUNTER — APPOINTMENT (OUTPATIENT)
Dept: LAB | Facility: HOSPITAL | Age: 53
End: 2018-08-20
Attending: UROLOGY
Payer: COMMERCIAL

## 2018-08-20 DIAGNOSIS — R31.21 ASYMPTOMATIC MICROSCOPIC HEMATURIA: ICD-10-CM

## 2018-08-20 DIAGNOSIS — Z12.5 ENCOUNTER FOR PROSTATE CANCER SCREENING: ICD-10-CM

## 2018-08-20 LAB — PSA SERPL-MCNC: 0.7 NG/ML (ref 0–4)

## 2018-08-20 PROCEDURE — 74176 CT ABD & PELVIS W/O CONTRAST: CPT

## 2018-08-20 PROCEDURE — 36415 COLL VENOUS BLD VENIPUNCTURE: CPT

## 2018-08-20 PROCEDURE — G0103 PSA SCREENING: HCPCS

## 2018-08-22 ENCOUNTER — TELEPHONE (OUTPATIENT)
Dept: UROLOGY | Facility: MEDICAL CENTER | Age: 53
End: 2018-08-22

## 2018-08-22 DIAGNOSIS — R10.9 ABDOMINAL PAIN, UNSPECIFIED ABDOMINAL LOCATION: Primary | ICD-10-CM

## 2018-08-22 NOTE — PROGRESS NOTES
Please call the patient regarding his abnormal result   Small umbilical hernia fatty liver otherwise everything looks good

## 2018-08-22 NOTE — TELEPHONE ENCOUNTER
I've informed the patient that we have his results  Though Dr Alana Reveles is not here in the office today, I'll make him aware that his PSA and CT results are ready (the doctor is usually pretty good about answering his messages between cases) Once they've been read by him, we'll call back with his results

## 2018-09-04 ENCOUNTER — TELEPHONE (OUTPATIENT)
Dept: UROLOGY | Facility: AMBULATORY SURGERY CENTER | Age: 53
End: 2018-09-04

## 2018-09-04 NOTE — TELEPHONE ENCOUNTER
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

## 2018-09-04 NOTE — PROGRESS NOTES
Patient has not reached out to schedule since 6/6/2018  Pt will be D/C from physical therapy unless otherwise notified

## 2018-09-14 ENCOUNTER — TELEPHONE (OUTPATIENT)
Dept: FAMILY MEDICINE CLINIC | Facility: CLINIC | Age: 53
End: 2018-09-14

## 2018-09-14 DIAGNOSIS — J06.0 ACUTE LARYNGOPHARYNGITIS: Primary | ICD-10-CM

## 2018-09-14 NOTE — TELEPHONE ENCOUNTER
Per Verbal orders from Dr Arcelia Perry - call in Rx of Cipro 500 mg 1 BID x 10 days to pt's pharm - RX called to pharm

## 2018-09-14 NOTE — TELEPHONE ENCOUNTER
CC:  Bladder pain    Pt seen in office 8/10/18 for blood in urine & bladder pain Rx'd Cipro 500mg #7 - As had testing - Finished Rx    HAVING PAIN AGAIN - REQUESTING RX OF ANTIBIOTIC WITH LONGER DURATION     Allergies: Bee Venom, Iodine contrast, & seasonal allergies    Pharm: [facility]

## 2018-09-17 RX ORDER — CIPROFLOXACIN 500 MG/1
500 TABLET, FILM COATED ORAL EVERY 12 HOURS SCHEDULED
Qty: 20 TABLET | Refills: 0
Start: 2018-09-17 | End: 2018-09-27

## 2018-09-20 ENCOUNTER — TELEPHONE (OUTPATIENT)
Dept: UROLOGY | Facility: MEDICAL CENTER | Age: 53
End: 2018-09-20

## 2018-09-20 DIAGNOSIS — R30.9 PAINFUL URINATION: Primary | ICD-10-CM

## 2018-09-20 NOTE — TELEPHONE ENCOUNTER
Pt C/O burning, pain, and discomfort while urinating, wondering if he could have antibiotics, please advise    [phone]

## 2018-09-20 NOTE — TELEPHONE ENCOUNTER
Called pt  He is going for a urine culture tomorrow am   He is having the same symptoms off and on since his last visit  I will forward to Dr Misael Salmon to see if he wants to start him on an ABX before the weekend or wait until Monday when the culture will be back

## 2018-09-21 ENCOUNTER — APPOINTMENT (OUTPATIENT)
Dept: LAB | Facility: HOSPITAL | Age: 53
End: 2018-09-21
Attending: UROLOGY
Payer: COMMERCIAL

## 2018-09-21 DIAGNOSIS — N41.1 CHRONIC PROSTATITIS: Primary | ICD-10-CM

## 2018-09-21 DIAGNOSIS — R30.9 PAINFUL URINATION: ICD-10-CM

## 2018-09-21 PROCEDURE — 87086 URINE CULTURE/COLONY COUNT: CPT

## 2018-09-21 RX ORDER — CIPROFLOXACIN 500 MG/1
500 TABLET, FILM COATED ORAL EVERY 12 HOURS SCHEDULED
Qty: 14 TABLET | Refills: 0 | Status: SHIPPED | OUTPATIENT
Start: 2018-09-21 | End: 2018-09-28

## 2018-09-22 LAB — BACTERIA UR CULT: NORMAL

## 2018-10-11 ENCOUNTER — PROCEDURE VISIT (OUTPATIENT)
Dept: UROLOGY | Facility: MEDICAL CENTER | Age: 53
End: 2018-10-11
Payer: COMMERCIAL

## 2018-10-11 VITALS
DIASTOLIC BLOOD PRESSURE: 90 MMHG | SYSTOLIC BLOOD PRESSURE: 130 MMHG | HEIGHT: 70 IN | WEIGHT: 262 LBS | BODY MASS INDEX: 37.51 KG/M2

## 2018-10-11 DIAGNOSIS — R10.2 PELVIC PAIN: ICD-10-CM

## 2018-10-11 DIAGNOSIS — R31.29 MICROSCOPIC HEMATURIA: Primary | ICD-10-CM

## 2018-10-11 DIAGNOSIS — Z12.5 ENCOUNTER FOR PROSTATE CANCER SCREENING: ICD-10-CM

## 2018-10-11 LAB
SL AMB  POCT GLUCOSE, UA: ABNORMAL
SL AMB LEUKOCYTE ESTERASE,UA: ABNORMAL
SL AMB POCT BILIRUBIN,UA: ABNORMAL
SL AMB POCT BLOOD,UA: ABNORMAL
SL AMB POCT CLARITY,UA: CLEAR
SL AMB POCT COLOR,UA: YELLOW
SL AMB POCT KETONES,UA: ABNORMAL
SL AMB POCT NITRITE,UA: ABNORMAL
SL AMB POCT PH,UA: 8
SL AMB POCT SPECIFIC GRAVITY,UA: 1.01
SL AMB POCT URINE PROTEIN: ABNORMAL
SL AMB POCT UROBILINOGEN: 0.2

## 2018-10-11 PROCEDURE — 52000 CYSTOURETHROSCOPY: CPT | Performed by: UROLOGY

## 2018-10-11 PROCEDURE — 81003 URINALYSIS AUTO W/O SCOPE: CPT | Performed by: UROLOGY

## 2018-10-11 PROCEDURE — 99213 OFFICE O/P EST LOW 20 MIN: CPT | Performed by: UROLOGY

## 2018-10-11 RX ORDER — CIPROFLOXACIN 500 MG/1
500 TABLET, FILM COATED ORAL ONCE
Qty: 1 TABLET | Refills: 0 | Status: SHIPPED | OUTPATIENT
Start: 2018-10-11 | End: 2018-10-11

## 2018-10-11 NOTE — LETTER
2018     Radha Booth, DO  [address] [place] [id number]    Patient: Cailin Ivory  YOB: 1965   Date of Visit: 10/11/2018       Dear Dr Gisella Mracos: Thank you for referring Lupillo Castillo to me for evaluation  Below are my notes for this consultation  If you have questions, please do not hesitate to call me  I look forward to following your patient along with you  Sincerely,        Ashley Atkins MD        CC: No Recipients  Ashley Atkins MD  10/11/2018  9:45 AM  Sign at close encounter  [address] [facility]  [address], [address] [place] [phone]  www  [facility]  org      NAME: Cailin Ivory  AGE: 46 y o  SEX: male  : 1965   MRN: [id number]    DATE: 10/11/2018  TIME: 8:46 AM    Assessment and Plan:  Microscopic hematuria:  Negative workup  Pelvic pain:  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, and I think this is the source of his pain with intercourse and urination  The best treatment for this is pelvic floor muscle therapy / physical therapy  He wishes to try this on his own and will look up resources on the Internet to see what exercises are available  If he wishes, we can give him injections into his left groin for a genitofemoral nerve block upon his request with bupivacaine and Solu-Cortef  Chief Complaint   No chief complaint on file  History of Present Illness   Here for cystoscopy for microscopic hematuria and suprapubic pain of unknown etiology  Last time I saw him I prescribed Cipro 500 mg p o  b i d  for 7 days  CT scan 2018 shows no urinary tract abnormalities, and he has a small fat containing umbilical hernia  There is evidence of previous abdominal plasty with surgical mesh present  He persists with pain in the urethral region and the pain is worse with sex and trying to urinate    He also has constant pain in the left groin region  PSA 0 7  Cystoscopy  Date/Time: 10/11/2018 8:48 AM  Performed by: Wojciech Aldridge  Authorized by: Wojciech Aldridge     Procedure details: cystoscopy    Additional Procedure Details: Cystoscopy Procedure Note        Pre-operative Diagnosis:   Micro hematuria, suprapubic pain    Post-operative Diagnosis:   Same    Procedure: Flexible cystoscopy    Surgeon: Rodney Solitario MD    Anesthesia: 1% Xylocaine per urethra    EBL: Minimal    Complications: none    Procedure Details   The risks, benefits, complications, treatment options, and expected outcomes were discussed with the patient  The patient concurred with the proposed plan, giving informed consent  Cystoscopy was performed today under local anesthesia, using sterile technique  The patient was placed in the supine position, prepped with Betadine, and draped in the usual sterile fashion  The flexible cystocope was used to inspect both the urethra and bladder    Findings:  Urethra:  Normal without stricture  Extremely tender at the membranous urethra where the sphincter is and it was very tight  Bladder:  Smooth, not trabeculated and there were no stones tumors or other lesions  The orifices were orthotopic and intact             Specimens:   None                 Complications:  None           Disposition: To home            Condition:  Stable              The following portions of the patient's history were reviewed and updated as appropriate: allergies, current medications, past family history, past medical history, past social history, past surgical history and problem list     Review of Systems   Review of Systems    Active Problem List     Patient Active Problem List   Diagnosis    Chondromalacia    Kienbock's disease of adults    Knee pain    Osteoarthritis    Osteoarthritis of hip    Osteoarthritis of knee    Tenosynovitis    Shoulder joint pain    Pain in elbow    Injury of wrist    Hip pain    Enthesopathy of wrist    Disorder of wrist joint    Disorder of shoulder    Tear of lateral cartilage or meniscus of knee, current    Disorder of bursae of shoulder region    Articular cartilage disorder of wrist       Objective   There were no vitals taken for this visit      Physical Exam        Current Medications     Current Outpatient Prescriptions:     albuterol (VENTOLIN HFA) 90 mcg/act inhaler, Inhale 2 puffs every 4 (four) hours as needed for wheezing or shortness of breath, Disp: 1 Inhaler, Rfl: 0    EPINEPHrine (EPIPEN 2-HOANG) 0 3 mg/0 3 mL SOAJ, Inject as directed Twice daily, Disp: , Rfl:     meloxicam (MOBIC) 7 5 mg tablet, Take 7 5 mg by mouth daily, Disp: , Rfl:     Multiple Vitamins-Minerals (PX MENS MULTIVITAMINS) TABS, Take 1 tablet by mouth daily, Disp: , Rfl:     SUMAtriptan (IMITREX) 50 mg tablet, Take 1 tablet by mouth for migraine relief, may repeat every 2 hours max 200mg, Disp: 9 tablet, Rfl: 0        Brianna Pfeiffer MD

## 2018-10-11 NOTE — PROGRESS NOTES
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, and I think this is the source of his pain with intercourse and urination  The best treatment for this is pelvic floor muscle therapy / physical therapy  He wishes to try this on his own and will look up resources on the Internet to see what exercises are available  If he wishes, we can give him injections into his left groin for a genitofemoral nerve block upon his request with bupivacaine and Solu-Cortef  However he has a lot of left hip pain for which he receives injections and I think this may be the source of this  Follow-up 1 year with PSA  Chief Complaint   No chief complaint on file  History of Present Illness   Here for cystoscopy for microscopic hematuria and suprapubic pain of unknown etiology  Last time I saw him I prescribed Cipro 500 mg p o  b i d  for 7 days  CT scan 2018 shows no urinary tract abnormalities, and he has a small fat containing umbilical hernia  There is evidence of previous abdominal plasty with surgical mesh present  He persists with pain in the urethral region and the pain is worse with sex and trying to urinate  He also has constant pain in the left groin region  PSA 0 7      Cystoscopy  Date/Time: 10/11/2018 8:48 AM  Performed by: Roslyn Ellis by: Ciro Velez     Procedure details: cystoscopy    Additional Procedure Details: Cystoscopy Procedure Note        Pre-operative Diagnosis:   Micro hematuria, suprapubic pain    Post-operative Diagnosis:   Same    Procedure: Flexible cystoscopy    Surgeon: Medhat Mobley MD    Anesthesia: 1% Xylocaine per urethra    EBL: Minimal    Complications: none    Procedure Details   The risks, benefits, complications, treatment options, and expected outcomes were discussed with the patient  The patient concurred with the proposed plan, giving informed consent  Cystoscopy was performed today under local anesthesia, using sterile technique  The patient was placed in the supine position, prepped with Betadine, and draped in the usual sterile fashion  The flexible cystocope was used to inspect both the urethra and bladder    Findings:  Urethra:  Normal without stricture  Extremely tender at the membranous urethra where the sphincter is and it was very tight  Bladder:  Smooth, not trabeculated and there were no stones tumors or other lesions  The orifices were orthotopic and intact  Specimens:   None                 Complications:  None           Disposition: To home            Condition:  Stable              The following portions of the patient's history were reviewed and updated as appropriate: allergies, current medications, past family history, past medical history, past social history, past surgical history and problem list     Review of Systems   Review of Systems    Active Problem List     Patient Active Problem List   Diagnosis    Chondromalacia    Kienbock's disease of adults    Knee pain    Osteoarthritis    Osteoarthritis of hip    Osteoarthritis of knee    Tenosynovitis    Shoulder joint pain    Pain in elbow    Injury of wrist    Hip pain    Enthesopathy of wrist    Disorder of wrist joint    Disorder of shoulder    Tear of lateral cartilage or meniscus of knee, current    Disorder of bursae of shoulder region    Articular cartilage disorder of wrist       Objective   There were no vitals taken for this visit      Physical Exam        Current Medications     Current Outpatient Prescriptions:     albuterol (VENTOLIN HFA) 90 mcg/act inhaler, Inhale 2 puffs every 4 (four) hours as needed for wheezing or shortness of breath, Disp: 1 Inhaler, Rfl: 0    EPINEPHrine (EPIPEN 2-HOANG) 0 3 mg/0 3 mL SOAJ, Inject as directed Twice daily, Disp: , Rfl:     meloxicam (MOBIC) 7 5 mg tablet, Take 7 5 mg by mouth daily, Disp: , Rfl:     Multiple Vitamins-Minerals (PX MENS MULTIVITAMINS) TABS, Take 1 tablet by mouth daily, Disp: , Rfl:     SUMAtriptan (IMITREX) 50 mg tablet, Take 1 tablet by mouth for migraine relief, may repeat every 2 hours max 200mg, Disp: 9 tablet, Rfl: 0        Tiffany Vickers MD

## 2018-10-11 NOTE — PROGRESS NOTES
Patient left without his script for Cipro  Order called into [facility] in [place]  Cipro 500mg 1 PO QD #1 with NO refills was verbally given to SANTI Mendoza  Ph   Patient aware of same

## 2018-12-13 ENCOUNTER — EVALUATION (OUTPATIENT)
Dept: PHYSICAL THERAPY | Facility: CLINIC | Age: 53
End: 2018-12-13
Payer: COMMERCIAL

## 2018-12-13 DIAGNOSIS — S76.011D MUSCLE STRAIN OF GLUTEAL REGION, RIGHT, SUBSEQUENT ENCOUNTER: Primary | ICD-10-CM

## 2018-12-13 PROCEDURE — 97010 HOT OR COLD PACKS THERAPY: CPT | Performed by: PHYSICAL THERAPIST

## 2018-12-13 PROCEDURE — 97162 PT EVAL MOD COMPLEX 30 MIN: CPT | Performed by: PHYSICAL THERAPIST

## 2018-12-13 PROCEDURE — G8978 MOBILITY CURRENT STATUS: HCPCS | Performed by: PHYSICAL THERAPIST

## 2018-12-13 PROCEDURE — 97140 MANUAL THERAPY 1/> REGIONS: CPT | Performed by: PHYSICAL THERAPIST

## 2018-12-13 PROCEDURE — G8979 MOBILITY GOAL STATUS: HCPCS | Performed by: PHYSICAL THERAPIST

## 2018-12-13 PROCEDURE — 97110 THERAPEUTIC EXERCISES: CPT | Performed by: PHYSICAL THERAPIST

## 2018-12-13 PROCEDURE — 97535 SELF CARE MNGMENT TRAINING: CPT | Performed by: PHYSICAL THERAPIST

## 2018-12-13 NOTE — LETTER
2018    Gerardo Michel DO  [address]  [address] [place] [address]    Patient: Leeann Forrest  YOB: 1965   Date of Visit: 2018     Encounter Diagnosis     ICD-10-CM    1  Muscle strain of gluteal region, right, subsequent encounter S76 011D        Dear Dr Mindy Holley:    Please review the attached Plan of Care from [facility] recent visit  Please verify that you agree therapy should continue by signing the attached document and sending it back to our office  If you have any questions or concerns, please don't hesitate to call  Sincerely,    Jerry Prasad, PT, DPT, ATC, ART      Referring Provider:      I certify that I have read the below Plan of Care and certify the need for these services furnished under this plan of treatment while under my care  Gerardo Michel DO  [address]  [address] [place] [address]: [phone]          PT Evaluation     Today's date: 2018  Patient name: Leeann Forrest  : 1965  MRN: [id number]  Referring provider: Tyrel Parrish DO  Dx:   Encounter Diagnosis     ICD-10-CM    1   Muscle strain of gluteal region, right, subsequent encounter S76 011D                   Assessment  Understanding of Dx/Px/POC: good   Prognosis: good    Goals  STGs: To be complete within 4 weeks  - Decrease/centralize pain to < 2/10 at worst  - Increase lumbar extension ROM to WNL  - Increase bilateral hip flexor flexibility to WNL  - Increase core stability and posterior lateral LE chain strength to > 4+/5  - Improve postural awareness capacity to > 60min before deficit    LTGs: To be complete within 6 weeks  - Able to sit for any extended amount of time without pain or limitation for increased safety and functional capacity with ADLs and work-related duty  - Able to repetitively bend over at trunk without pain or limitation for increased safety and functional capacity with ADLs and and work-related duty  - Able to complete all lifting/carrying activity without pain or limitation for increased safety and functional capacity with ADLs and work-related duty  - Able to complete transfers repetitively without pain or limitation for increased safety and functional capacity with ADLs and work-related duty    Plan  Frequency: 3x week  Duration in weeks: 4         Pt is a 48 y o  male with chronic postero-lateral R Hip pain and R side lumbo-pelvic pain who presents with functional deficits including decreased capacity with sitting, lifting/carrying, transfers, and bending over at the trunk  Upon completion of today's initial evaluation, Patient's sx remain consistent with R Glut Med tendonitis, R SI joint dysfunction, and R side post/lat lumbar derangement secondary to postural dysfunction and improper functional movement mechanics  Pt will benefit from skilled physical therapy to address current deficits  Subjective Evaluation    Pain  Current pain ratin  At best pain ratin  At worst pain ratin  Location: Postero-lateral R Hip           Pt reports worsening postero-lateral R hip pain for > 2 months  Reports his have continued to worsen to point where they are negatively effecting his overall safety and functional capacity with ADLs and work-related duty       PMHx: R knee instability and severe pain; L hip pain and instability        Objective (+) Brandon Test; (+) Gaenslen's Test  Pain level ranges 2-8/10  AROM: Lumbar extension, R Hip extension, and R Hip IR 50% decreased  Strength: Core stability and R LE post/lat LE chain strength 3+/5; R quadriceps 3+/5  Lumbar vertebral segment mobility: Moderate restriction  Postural Awareness: Poor (Ant pelvic tilt)  Repeated movement testing: (+) for R side post/lat lumbar derangement  Gait: Mod lateral limp (Chronic R knee and L hip instability and OA)  Hip flexor flexibility: Mod to severe shortening (will continue to monitor)  Unable sit without pain and limitation  Unable to bend over at trunk without pain and limitation  Unable to complete lifting/carrying activity without pain and limitation  Unable to complete transfers without pain and limitation        Precautions: Marisa principle for R side post/lat lumbar derangement    Daily Treatment Diary     HEP: Sheet provided and discussed    Manual  12/13/18            ART x25'            IASTM             JM             Stretch             Triggerpoint                 Exercise Diary  12/13/18            Prone on elbows x10'                         Prone Press Ups 2x10            Front Wall Glides             Lateral Wall Glides             LTR             SKTC             DKTC             PB fwd trunk flx             Prone hip ext R LE 2x10            Prone hip IR with TB 3x10 L3            Prone hip ER with Ball 3x10            Prone Hip flexor stretch              No Shoe AE Steamboats             Lateral walking              QKB             Cat Backs             On elbows hip ext                                           Modalities  12/13/18            MHP Prone             CP Prone x12'            STIM

## 2018-12-13 NOTE — PROGRESS NOTES
PT Evaluation     Today's date: 2018  Patient name: Chelly Horowitz  : 1965  MRN: [id number]  Referring provider: Zoila Orr DO  Dx:   Encounter Diagnosis     ICD-10-CM    1  Muscle strain of gluteal region, right, subsequent encounter S76 011D                   Assessment  Understanding of Dx/Px/POC: good   Prognosis: good    Goals  STGs: To be complete within 4 weeks  - Decrease/centralize pain to < 2/10 at worst  - Increase lumbar extension ROM to WNL  - Increase bilateral hip flexor flexibility to WNL  - Increase core stability and posterior lateral LE chain strength to > 4+/5  - Improve postural awareness capacity to > 60min before deficit    LTGs: To be complete within 6 weeks  - Able to sit for any extended amount of time without pain or limitation for increased safety and functional capacity with ADLs and work-related duty  - Able to repetitively bend over at trunk without pain or limitation for increased safety and functional capacity with ADLs and and work-related duty  - Able to complete all lifting/carrying activity without pain or limitation for increased safety and functional capacity with ADLs and work-related duty  - Able to complete transfers repetitively without pain or limitation for increased safety and functional capacity with ADLs and work-related duty    Plan  Frequency: 3x week  Duration in weeks: 4         Pt is a 48 y o  male with chronic postero-lateral R Hip pain and R side lumbo-pelvic pain who presents with functional deficits including decreased capacity with sitting, lifting/carrying, transfers, and bending over at the trunk  Upon completion of today's initial evaluation, Patient's sx remain consistent with R Glut Med tendonitis, R SI joint dysfunction, and R side post/lat lumbar derangement secondary to postural dysfunction and improper functional movement mechanics  Pt will benefit from skilled physical therapy to address current deficits      Subjective Evaluation    Pain  Current pain ratin  At best pain ratin  At worst pain ratin  Location: Postero-lateral R Hip           Pt reports worsening postero-lateral R hip pain for > 2 months  Reports his have continued to worsen to point where they are negatively effecting his overall safety and functional capacity with ADLs and work-related duty       PMHx: R knee instability and severe pain; L hip pain and instability        Objective (+) Brandon Test; (+) Gaenslen's Test  Pain level ranges 2-8/10  AROM: Lumbar extension, R Hip extension, and R Hip IR 50% decreased  Strength: Core stability and R LE post/lat LE chain strength 3+/5; R quadriceps 3+/5  Lumbar vertebral segment mobility: Moderate restriction  Postural Awareness: Poor (Ant pelvic tilt)  Repeated movement testing: (+) for R side post/lat lumbar derangement  Gait: Mod lateral limp (Chronic R knee and L hip instability and OA)  Hip flexor flexibility: Mod to severe shortening (will continue to monitor)  Unable sit without pain and limitation  Unable to bend over at trunk without pain and limitation  Unable to complete lifting/carrying activity without pain and limitation  Unable to complete transfers without pain and limitation        Precautions: Marisa principle for R side post/lat lumbar derangement    Daily Treatment Diary     HEP: Sheet provided and discussed    Manual  18            ART x25'            IASTM             JM             Stretch             Triggerpoint                 Exercise Diary  18            Prone on elbows x10'                         Prone Press Ups 2x10            Front Wall Glides             Lateral Wall Glides             LTR             SKTC             DKTC             PB fwd trunk flx             Prone hip ext R LE 2x10            Prone hip IR with TB 3x10 L3            Prone hip ER with Ball 3x10            Prone Hip flexor stretch              No Shoe AE Steamboats             Lateral walking QKB             Cat Backs             On elbows hip ext                                           Modalities  12/13/18            MHP Prone             CP Prone x12'            STIM

## 2018-12-17 ENCOUNTER — OFFICE VISIT (OUTPATIENT)
Dept: PHYSICAL THERAPY | Facility: CLINIC | Age: 53
End: 2018-12-17
Payer: COMMERCIAL

## 2018-12-17 DIAGNOSIS — S76.011D MUSCLE STRAIN OF GLUTEAL REGION, RIGHT, SUBSEQUENT ENCOUNTER: Primary | ICD-10-CM

## 2018-12-17 PROCEDURE — 97140 MANUAL THERAPY 1/> REGIONS: CPT | Performed by: PHYSICAL THERAPIST

## 2018-12-17 PROCEDURE — 97112 NEUROMUSCULAR REEDUCATION: CPT | Performed by: PHYSICAL THERAPIST

## 2018-12-17 PROCEDURE — 97110 THERAPEUTIC EXERCISES: CPT | Performed by: PHYSICAL THERAPIST

## 2018-12-17 PROCEDURE — 97010 HOT OR COLD PACKS THERAPY: CPT | Performed by: PHYSICAL THERAPIST

## 2018-12-17 NOTE — PROGRESS NOTES
Daily Note     Today's date: 2018  Patient name: Zander Cesar  : 1965  MRN: [id number]  Referring provider: Audrey Askew DO  Dx:   Encounter Diagnosis     ICD-10-CM    1  Muscle strain of gluteal region, right, subsequent encounter S76 011D                   Subjective: Pt reports not much change thus far since IE  Pt reports only being able to complete HEP one time since IE  Reports he is going to attempt to be more avid about HEP going forward  Objective: See treatment diary below      Assessment: Tolerated treatment well  Patient demonstrated fatigue post treatment, exhibited good technique with therapeutic exercises and would benefit from continued PT      Plan: Continue per plan of care  Progress treatment as tolerated        Precautions: Marisa principle for R side post/lat lumbar derangement    Daily Treatment Diary     HEP: Sheet provided and discussed    Manual  18            ART x20'            IASTM             JM             Stretch             Triggerpoint                 Exercise Diary  18            Prone on elbows x10'                         Prone Press Ups 2x10            Front Wall Glides             Lateral Wall Glides             LTR             SKTC             DKTC             PB fwd trunk flx             Prone hip ext R LE 2x10            Prone hip IR with TB 3x10 L3            Prone hip ER with Ball 3x10            Prone Hip flexor stretch              No Shoe AE Steamboats             Lateral walking              QKB             Cat Backs             On elbows hip ext                                           Modalities  18            MHP Prone             CP Prone x12'            STIM

## 2018-12-19 ENCOUNTER — APPOINTMENT (OUTPATIENT)
Dept: PHYSICAL THERAPY | Facility: CLINIC | Age: 53
End: 2018-12-19
Payer: COMMERCIAL

## 2018-12-20 ENCOUNTER — OFFICE VISIT (OUTPATIENT)
Dept: PHYSICAL THERAPY | Facility: CLINIC | Age: 53
End: 2018-12-20
Payer: COMMERCIAL

## 2018-12-20 DIAGNOSIS — S76.011D MUSCLE STRAIN OF GLUTEAL REGION, RIGHT, SUBSEQUENT ENCOUNTER: Primary | ICD-10-CM

## 2018-12-20 PROCEDURE — 97530 THERAPEUTIC ACTIVITIES: CPT | Performed by: PHYSICAL THERAPIST

## 2018-12-20 PROCEDURE — 97140 MANUAL THERAPY 1/> REGIONS: CPT | Performed by: PHYSICAL THERAPIST

## 2018-12-20 PROCEDURE — 97110 THERAPEUTIC EXERCISES: CPT | Performed by: PHYSICAL THERAPIST

## 2018-12-20 PROCEDURE — 97112 NEUROMUSCULAR REEDUCATION: CPT | Performed by: PHYSICAL THERAPIST

## 2018-12-20 PROCEDURE — 97010 HOT OR COLD PACKS THERAPY: CPT | Performed by: PHYSICAL THERAPIST

## 2018-12-20 NOTE — PROGRESS NOTES
Daily Note     Today's date: 2018  Patient name: Alana Parnell  : 1965  MRN: [id number]  Referring provider: Ronnell Tucker DO  Dx:   Encounter Diagnosis     ICD-10-CM    1  Muscle strain of gluteal region, right, subsequent encounter S76 011D                   Subjective: Pt reports feeling better after last session, but limited carryover into the next day  Hoping to continue to see progress with each visit  Objective: See treatment diary below      Assessment: Tolerated treatment well  Patient demonstrated fatigue post treatment, exhibited good technique with therapeutic exercises and would benefit from continued PT      Plan: Continue per plan of care  Progress treatment as tolerated        Precautions: Marisa principle for R side post/lat lumbar derangement    Daily Treatment Diary     HEP: Sheet provided and discussed    Manual  18            ART x20'            IASTM             JM             Stretch             Triggerpoint                 Exercise Diary  18            Prone on elbows x10'            TM Walk x10' incline            Prone Press Ups 2x10            Front Wall Glides             Lateral Wall Glides             LTR             SKTC             DKTC             PB fwd trunk flx             Prone hip ext R LE 2x10            Prone hip IR with TB 3x10 L3            Prone hip ER with Ball 3x10            Prone Hip flexor stretch              No Shoe AE Steamboats             Lateral walking              QKB             Cat Backs             On elbows hip ext                                           Modalities  18            MHP Prone             CP Prone x12'            STIM

## 2018-12-26 ENCOUNTER — OFFICE VISIT (OUTPATIENT)
Dept: PHYSICAL THERAPY | Facility: CLINIC | Age: 53
End: 2018-12-26
Payer: COMMERCIAL

## 2018-12-26 DIAGNOSIS — S76.011D MUSCLE STRAIN OF GLUTEAL REGION, RIGHT, SUBSEQUENT ENCOUNTER: Primary | ICD-10-CM

## 2018-12-26 PROCEDURE — 97010 HOT OR COLD PACKS THERAPY: CPT | Performed by: PHYSICAL THERAPIST

## 2018-12-26 PROCEDURE — 97112 NEUROMUSCULAR REEDUCATION: CPT | Performed by: PHYSICAL THERAPIST

## 2018-12-26 PROCEDURE — 97110 THERAPEUTIC EXERCISES: CPT | Performed by: PHYSICAL THERAPIST

## 2018-12-26 PROCEDURE — 97140 MANUAL THERAPY 1/> REGIONS: CPT | Performed by: PHYSICAL THERAPIST

## 2018-12-26 NOTE — PROGRESS NOTES
Daily Note     Today's date: 2018  Patient name: Shannan Jha  : 1965  MRN: [id number]  Referring provider: Georgena Paget, DO  Dx:   Encounter Diagnosis     ICD-10-CM    1  Muscle strain of gluteal region, right, subsequent encounter S76 011D                   Subjective: Pt reports feeling better immediately after each session, but limited carryover into the next day  Hoping to continue to see progress with each visit  Objective: See treatment diary below      Assessment: Tolerated treatment well  Patient demonstrated fatigue post treatment, exhibited good technique with therapeutic exercises and would benefit from continued PT      Plan: Continue per plan of care  Progress treatment as tolerated        Precautions: Marisa principle for R side post/lat lumbar derangement    Daily Treatment Diary     HEP: Sheet provided and discussed    Manual  18            ART x20'            IASTM             JM             Stretch             Triggerpoint                 Exercise Diary  18            Prone on elbows x10'            TM Walk x10' incline            Prone Press Ups 2x10            Front Wall Glides             Lateral Wall Glides             LTR             SKTC             DKTC             PB fwd trunk flx             Prone hip ext R LE 2x10            Prone hip IR with TB 3x10 L3            Prone hip ER with Ball 3x10            Prone Hip flexor stretch              No Shoe AE Steamboats             Lateral walking              QKB             Cat Backs             On elbows hip ext                                           Modalities  18            MHP Prone             CP Prone x12'            STIM

## 2019-01-02 ENCOUNTER — OFFICE VISIT (OUTPATIENT)
Dept: PHYSICAL THERAPY | Facility: CLINIC | Age: 54
End: 2019-01-02
Payer: COMMERCIAL

## 2019-01-02 DIAGNOSIS — S76.011D MUSCLE STRAIN OF GLUTEAL REGION, RIGHT, SUBSEQUENT ENCOUNTER: Primary | ICD-10-CM

## 2019-01-02 PROCEDURE — 97010 HOT OR COLD PACKS THERAPY: CPT | Performed by: PHYSICAL THERAPIST

## 2019-01-02 PROCEDURE — 97110 THERAPEUTIC EXERCISES: CPT | Performed by: PHYSICAL THERAPIST

## 2019-01-02 PROCEDURE — 97112 NEUROMUSCULAR REEDUCATION: CPT | Performed by: PHYSICAL THERAPIST

## 2019-01-02 PROCEDURE — 97140 MANUAL THERAPY 1/> REGIONS: CPT | Performed by: PHYSICAL THERAPIST

## 2019-01-02 NOTE — PROGRESS NOTES
Daily Note     Today's date: 2019  Patient name: Haja Kwok  : 1965  MRN: [id number]  Referring provider: Do Thomas DO  Dx:   Encounter Diagnosis     ICD-10-CM    1  Muscle strain of gluteal region, right, subsequent encounter S76 011D                   Subjective: Pt reports feeling better immediately after each session, but limited carryover into the next day  Hoping to continue to see progress with each visit  Objective: See treatment diary below      Assessment: Tolerated treatment well  Patient demonstrated fatigue post treatment, exhibited good technique with therapeutic exercises and would benefit from continued PT      Plan: Continue per plan of care  Progress treatment as tolerated        Precautions: Marisa principle for R side post/lat lumbar derangement    Daily Treatment Diary     HEP: Sheet provided and discussed    Manual  19            ART x20'            IASTM             JM             Stretch             Triggerpoint                 Exercise Diary  19            Prone on elbows x10'            TM Walk x10' incline            Prone Press Ups 2x10            Front Wall Glides             Lateral Wall Glides             LTR             SKTC             DKTC             PB fwd trunk flx             Prone hip ext R LE 2x10            Prone hip IR with TB 3x10 L3            Prone hip ER with Ball 3x10            Prone Hip flexor stretch              No Shoe AE Steamboats             Lateral walking              QKB             Cat Backs             On elbows hip ext                                           Modalities  19            MHP Prone             CP Prone x12'            STIM

## 2019-01-03 ENCOUNTER — OFFICE VISIT (OUTPATIENT)
Dept: FAMILY MEDICINE CLINIC | Facility: CLINIC | Age: 54
End: 2019-01-03
Payer: COMMERCIAL

## 2019-01-03 ENCOUNTER — APPOINTMENT (OUTPATIENT)
Dept: PHYSICAL THERAPY | Facility: CLINIC | Age: 54
End: 2019-01-03
Payer: COMMERCIAL

## 2019-01-03 VITALS
TEMPERATURE: 97.8 F | BODY MASS INDEX: 39.22 KG/M2 | HEIGHT: 70 IN | SYSTOLIC BLOOD PRESSURE: 106 MMHG | WEIGHT: 274 LBS | DIASTOLIC BLOOD PRESSURE: 86 MMHG

## 2019-01-03 DIAGNOSIS — J01.01 ACUTE RECURRENT MAXILLARY SINUSITIS: Primary | ICD-10-CM

## 2019-01-03 PROCEDURE — 99213 OFFICE O/P EST LOW 20 MIN: CPT | Performed by: FAMILY MEDICINE

## 2019-01-03 RX ORDER — CEFUROXIME AXETIL 500 MG/1
500 TABLET ORAL EVERY 12 HOURS SCHEDULED
Qty: 20 TABLET | Refills: 0 | Status: SHIPPED | OUTPATIENT
Start: 2019-01-03 | End: 2019-01-13

## 2019-01-03 NOTE — PROGRESS NOTES
Assessment/Plan:    No problem-specific Assessment & Plan notes found for this encounter  Diagnoses and all orders for this visit:    Acute recurrent maxillary sinusitis  -     cefuroxime (CEFTIN) 500 mg tablet; Take 1 tablet (500 mg total) by mouth every 12 (twelve) hours for 10 days    Other orders  -     esomeprazole (NexIUM) 20 mg capsule; Take 20 mg by mouth every morning before breakfast          Subjective:      Patient ID: Everardo Mares  is a 48 y o  male  Patient here for of a head cold he is anticipating a partial right knee replacement at the [facility] and would like to avoid having to postpone his surgery due to an upper respiratory infection he has sinus congestion and also has a cough with expectoration of purulent mucus        The following portions of the patient's history were reviewed and updated as appropriate:   He  has a past medical history of Bee allergy status; Benign lipomatous neoplasm; Cellulitis of mid back region; High risk medication use; Internal derangement of knee, left; Pelvic cramping; Prostatism; and Sebaceous cyst   He   Patient Active Problem List    Diagnosis Date Noted    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    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    Knee pain 04/27/2018    Osteoarthritis 04/27/2018    Osteoarthritis of hip 04/27/2018    Osteoarthritis of knee 04/27/2018    Tenosynovitis 04/27/2018    Shoulder joint pain 04/27/2018    Pain in elbow 04/27/2018    Injury of wrist 04/27/2018    Hip pain 04/27/2018    Enthesopathy of wrist 04/27/2018    Disorder of wrist joint 04/27/2018    Disorder of shoulder 04/27/2018    Tear of lateral cartilage or meniscus of knee, current 04/27/2018    Disorder of bursae of shoulder region 04/27/2018    Articular cartilage disorder of wrist 04/27/2018     He  has a past surgical history that includes ARTHROSCOPY ELBOW (Left, 2003); ARTHROSCOPY KNEE (Right); Shoulder arthroscopy (Left, 2003);  Shoulder arthroscopy (Right, 04/2012); TONSILLECTOMY; Wrist surgery (Left); Knee surgery (Left); and Hernia repair  His family history includes Asthma in his mother; Diabetes type II in his maternal grandfather  He  reports that he has never smoked  He quit smokeless tobacco use about 9 years ago  He reports that he drinks alcohol  He reports that he does not use drugs  Current Outpatient Prescriptions   Medication Sig Dispense Refill    EPINEPHrine (EPIPEN 2-HOANG) 0 3 mg/0 3 mL SOAJ Inject as directed Twice daily      esomeprazole (NexIUM) 20 mg capsule Take 20 mg by mouth every morning before breakfast      Multiple Vitamins-Minerals (PX MENS MULTIVITAMINS) TABS Take 1 tablet by mouth daily      SUMAtriptan (IMITREX) 50 mg tablet Take 1 tablet by mouth for migraine relief, may repeat every 2 hours max 200mg 9 tablet 0    albuterol (VENTOLIN HFA) 90 mcg/act inhaler Inhale 2 puffs every 4 (four) hours as needed for wheezing or shortness of breath (Patient not taking: Reported on 1/3/2019 ) 1 Inhaler 0    cefuroxime (CEFTIN) 500 mg tablet Take 1 tablet (500 mg total) by mouth every 12 (twelve) hours for 10 days 20 tablet 0    meloxicam (MOBIC) 7 5 mg tablet Take 7 5 mg by mouth daily       No current facility-administered medications for this visit  Current Outpatient Prescriptions on File Prior to Visit   Medication Sig    EPINEPHrine (EPIPEN 2-HOANG) 0 3 mg/0 3 mL SOAJ Inject as directed Twice daily    Multiple Vitamins-Minerals (PX MENS MULTIVITAMINS) TABS Take 1 tablet by mouth daily    SUMAtriptan (IMITREX) 50 mg tablet Take 1 tablet by mouth for migraine relief, may repeat every 2 hours max 200mg    albuterol (VENTOLIN HFA) 90 mcg/act inhaler Inhale 2 puffs every 4 (four) hours as needed for wheezing or shortness of breath (Patient not taking: Reported on 1/3/2019 )    meloxicam (MOBIC) 7 5 mg tablet Take 7 5 mg by mouth daily     No current facility-administered medications on file prior to visit  He is allergic to bee venom; other; and contrast  [iodinated diagnostic agents]       Review of Systems   Constitutional: Negative for activity change, appetite change, diaphoresis, fatigue and fever  HENT: Negative  Eyes: Negative  Respiratory: Positive for cough  Negative for apnea, chest tightness, shortness of breath and wheezing  Cardiovascular: Negative for chest pain, palpitations and leg swelling  Gastrointestinal: Negative for abdominal distention, abdominal pain, anal bleeding, constipation, diarrhea, nausea and vomiting  Endocrine: Negative for cold intolerance, heat intolerance, polydipsia, polyphagia and polyuria  Genitourinary: Negative for difficulty urinating, dysuria, flank pain, hematuria and urgency  Musculoskeletal: Negative for arthralgias, back pain, gait problem, joint swelling and myalgias  Skin: Negative for color change, rash and wound  Allergic/Immunologic: Negative for environmental allergies, food allergies and immunocompromised state  Neurological: Negative for dizziness, seizures, syncope, speech difficulty, numbness and headaches  Hematological: Negative for adenopathy  Does not bruise/bleed easily  Psychiatric/Behavioral: Negative for agitation, behavioral problems, hallucinations, sleep disturbance and suicidal ideas  Objective:      /86 (BP Location: Left arm, Patient Position: Sitting, Cuff Size: Standard)   Temp 97 8 °F (36 6 °C) (Tympanic)   Ht 5' 10" (1 778 m)   Wt 124 kg (274 lb)   BMI 39 31 kg/m²          Physical Exam   Constitutional: He is oriented to person, place, and time  He appears well-developed and well-nourished  No distress  HENT:   Head: Normocephalic  Right Ear: External ear normal    Left Ear: External ear normal    Nose: Nose normal    Mouth/Throat: Oropharynx is clear and moist    Eyes: Pupils are equal, round, and reactive to light  Conjunctivae and EOM are normal  Right eye exhibits no discharge   Left eye exhibits no discharge  No scleral icterus  Neck: Normal range of motion  No tracheal deviation present  No thyromegaly present  Cardiovascular: Normal rate, regular rhythm and normal heart sounds  Exam reveals no gallop and no friction rub  No murmur heard  Pulmonary/Chest: Effort normal and breath sounds normal  No respiratory distress  He has no wheezes  Abdominal: Soft  Bowel sounds are normal  He exhibits no mass  There is no tenderness  There is no guarding  Musculoskeletal: He exhibits no edema or deformity  Lymphadenopathy:     He has no cervical adenopathy  Neurological: He is alert and oriented to person, place, and time  No cranial nerve deficit  Skin: Skin is warm and dry  No rash noted  He is not diaphoretic  No erythema  Psychiatric: He has a normal mood and affect   Thought content normal

## 2019-01-09 ENCOUNTER — OFFICE VISIT (OUTPATIENT)
Dept: PHYSICAL THERAPY | Facility: CLINIC | Age: 54
End: 2019-01-09
Payer: COMMERCIAL

## 2019-01-09 DIAGNOSIS — S76.011D MUSCLE STRAIN OF GLUTEAL REGION, RIGHT, SUBSEQUENT ENCOUNTER: Primary | ICD-10-CM

## 2019-01-09 PROCEDURE — 97140 MANUAL THERAPY 1/> REGIONS: CPT | Performed by: PHYSICAL THERAPIST

## 2019-01-09 PROCEDURE — 97010 HOT OR COLD PACKS THERAPY: CPT | Performed by: PHYSICAL THERAPIST

## 2019-01-09 PROCEDURE — 97530 THERAPEUTIC ACTIVITIES: CPT | Performed by: PHYSICAL THERAPIST

## 2019-01-09 PROCEDURE — G8979 MOBILITY GOAL STATUS: HCPCS | Performed by: PHYSICAL THERAPIST

## 2019-01-09 PROCEDURE — 97112 NEUROMUSCULAR REEDUCATION: CPT | Performed by: PHYSICAL THERAPIST

## 2019-01-09 PROCEDURE — 97110 THERAPEUTIC EXERCISES: CPT | Performed by: PHYSICAL THERAPIST

## 2019-01-09 PROCEDURE — G8980 MOBILITY D/C STATUS: HCPCS | Performed by: PHYSICAL THERAPIST

## 2019-01-09 NOTE — PROGRESS NOTES
PT Discharge    Today's date: 2019  Patient name: Alvaro Robertson  : 1965  MRN: [id number]  Referring provider: Anjelica Cruz DO  Dx:   Encounter Diagnosis     ICD-10-CM    1  Muscle strain of gluteal region, right, subsequent encounter S76 011D                   Assessment  Understanding of Dx/Px/POC: good   Prognosis: good    Goals  STGs: To be complete within 4 weeks  - Decrease/centralize pain to < 2/10 at worst (partially met)  - Increase lumbar extension ROM to WNL (partially met)  - Increase bilateral hip flexor flexibility to WNL (partially met)  - Increase core stability and posterior lateral LE chain strength to > 4+/5 (partially met)  - Improve postural awareness capacity to > 60min before deficit (partially met)    LTGs: To be complete within 6 weeks  - Able to sit for any extended amount of time without pain or limitation for increased safety and functional capacity with ADLs and work-related duty (partially met)  - Able to repetitively bend over at trunk without pain or limitation for increased safety and functional capacity with ADLs and and work-related duty (partially met)  - Able to complete all lifting/carrying activity without pain or limitation for increased safety and functional capacity with ADLs and work-related duty (partially met)  - Able to complete transfers repetitively without pain or limitation for increased safety and functional capacity with ADLs and work-related duty (partially met)    Plan  Frequency: 3x week  Duration in weeks: 4      Pt will be D/C from physical therapy after today's session as he will be undergoing R michael-knee replacement next week  Pt has done well with physical therapy up through this point and has a good understanding of HEP  Pt instructed to reach out with any questions/concerns/setbacks      Subjective Evaluation    Pain  Current pain ratin  At best pain ratin  At worst pain ratin  Location: Postero-lateral R Hip           Pt reports he has a good understanding of HEP and feels better about going into his R michael-knee replacement surgery next week  Reports he will reach out with any questions/concerns/setbacks        Objective (+) Brandon Test; (+) Gaenslen's Test  Pain level ranges 0-4/10  AROM: Lumbar extension, R Hip extension, and R Hip IR 25% decreased  Strength: Core stability and R LE post/lat LE chain strength 4+/5; R quadriceps 4+/5  Lumbar vertebral segment mobility: Mild restriction  Postural Awareness: Fair to good (Ant pelvic tilt)  Repeated movement testing: (-) for R side post/lat lumbar derangement  Gait: Mild lateral limp (Chronic R knee and L hip instability and OA)  Hip flexor flexibility: Mild to mod shortening (will continue to monitor)  Able sit without pain and limitation > 75% of the time  Able to bend over at trunk without pain and limitation > 75% of the time  Able to complete lifting/carrying activity without pain and limitation > 75% capacity  Able to complete transfers without pain and limitation > 75% of the time      Flowsheet Rows      Most Recent Value   PT/OT G-Codes   [facility] information [facility]  -- [facility]   Assessment Type  Discharge   G code set  Mobility: Walking & Moving Around   Mobility: Walking and Moving Around Goal Status ()  CI   Mobility: Walking and Moving Around Discharge Status ()  CI          Precautions: Marisa principle for R side post/lat lumbar derangement    Daily Treatment Diary     HEP: Sheet provided and discussed    Manual  1/9/19            ART x20'            IASTM             JM             Stretch             Triggerpoint                 Exercise Diary  1/9/19            Prone on elbows x10'            TM Walk x10' incline            Prone Press Ups 2x10            Front Wall Glides             Lateral Wall Glides             LTR             SKTC             DKTC             PB fwd trunk flx             Prone hip ext R LE 2x10            Prone hip IR with TB 3x10 L3            Prone hip ER with Ball 3x10            Prone Hip flexor stretch              No Shoe AE Steamboats             Lateral walking              QKB             Cat Backs             On elbows hip ext                                           Modalities  1/9/19            MHP Prone             CP Prone x12'            STIM

## 2019-01-16 ENCOUNTER — EVALUATION (OUTPATIENT)
Dept: PHYSICAL THERAPY | Facility: CLINIC | Age: 54
End: 2019-01-16
Payer: COMMERCIAL

## 2019-01-16 ENCOUNTER — TRANSCRIBE ORDERS (OUTPATIENT)
Dept: PHYSICAL THERAPY | Facility: CLINIC | Age: 54
End: 2019-01-16

## 2019-01-16 DIAGNOSIS — G43.909 MIGRAINE WITHOUT STATUS MIGRAINOSUS, NOT INTRACTABLE, UNSPECIFIED MIGRAINE TYPE: ICD-10-CM

## 2019-01-16 DIAGNOSIS — S76.011D MUSCLE STRAIN OF GLUTEAL REGION, RIGHT, SUBSEQUENT ENCOUNTER: Primary | ICD-10-CM

## 2019-01-16 DIAGNOSIS — Z96.651 PRESENCE OF RIGHT ARTIFICIAL KNEE JOINT: Primary | ICD-10-CM

## 2019-01-16 PROCEDURE — G8979 MOBILITY GOAL STATUS: HCPCS | Performed by: PHYSICAL THERAPIST

## 2019-01-16 PROCEDURE — 97140 MANUAL THERAPY 1/> REGIONS: CPT | Performed by: PHYSICAL THERAPIST

## 2019-01-16 PROCEDURE — 97110 THERAPEUTIC EXERCISES: CPT | Performed by: PHYSICAL THERAPIST

## 2019-01-16 PROCEDURE — 97161 PT EVAL LOW COMPLEX 20 MIN: CPT | Performed by: PHYSICAL THERAPIST

## 2019-01-16 PROCEDURE — G8978 MOBILITY CURRENT STATUS: HCPCS | Performed by: PHYSICAL THERAPIST

## 2019-01-16 PROCEDURE — 97010 HOT OR COLD PACKS THERAPY: CPT | Performed by: PHYSICAL THERAPIST

## 2019-01-16 PROCEDURE — 97535 SELF CARE MNGMENT TRAINING: CPT | Performed by: PHYSICAL THERAPIST

## 2019-01-16 RX ORDER — SUMATRIPTAN 50 MG/1
TABLET, FILM COATED ORAL
Qty: 9 TABLET | Refills: 3 | Status: SHIPPED | OUTPATIENT
Start: 2019-01-16 | End: 2021-06-03

## 2019-01-16 NOTE — LETTER
2019    Didier Oleary DO  [address]  [address] [place] [address]    Patient: Raquel Mckee  YOB: 1965   Date of Visit: 2019     Encounter Diagnosis     ICD-10-CM    1  Presence of right artificial knee joint Z96 651        Dear Dr Bernardine Bence:    Please review the attached Plan of Care from [facility] COGNITIVE DISORDERS recent visit  Please verify that you agree therapy should continue by signing the attached document and sending it back to our office  If you have any questions or concerns, please don't hesitate to call  Sincerely,    Lily Claudio, PT, DPT, ATC, ART      Referring Provider:      I certify that I have read the below Plan of Care and certify the need for these services furnished under this plan of treatment while under my care  Didier AnirudhDO  [address]  [address] [place] [address]: [phone]          PT Evaluation     Today's date: 2019  Patient name: Raquel Mckee  : 1965  MRN: [id number]  Referring provider: Cathryn Dean DO  Dx:   Encounter Diagnosis     ICD-10-CM    1   Presence of right artificial knee joint Z96 651                   Assessment  Understanding of Dx/Px/POC: good   Prognosis: good    Goals  STGs: To be complete within 4 weeks  - Decrease pain to < 2/10 at worst  - Increase AROM to WNL  - Increase strength to > 4+/5  - Improve gait to WNL for distances < 6 blocks    - Able to walk for any extended amount of time/distance without pain or limitation for increased safety and functional capacity with ADLs and work-related duty  - Able to repetitively squat without pain or limitation for increased safety and functional capacity with ADLs and work-related duty  - Able to repetitively ascend/descend a full flight of stairs without pain or limitation for increased safety and functional capacity with ADLs and work-related duty  - Able to repetitively complete transfers without pain or limitation for increased safety and functional capacity with ADLs and work-related duty  - Able to keel for any extended amount of time without pain or limitation for increased safety and functional capacity with ADLs and work-related duty    Plan  Frequency: 3x week  Duration in weeks: 4         Pt is a very 48 y o  male with R knee pain who presents with functional deficits including decreased capacity with walking, squatting, kneeling, stairs, and transfers  Upon completion of today's initial evaluation, Ancelmo's sx remain consistent with being s/p R lateral partial knee replacement 1/15/19  Patient will benefit from skilled physical therapy to address current deficits  Subjective Evaluation    Pain  Current pain ratin  At best pain ratin  At worst pain ratin  Location: R Knee           Pt reports he is 1 day s/p R lateral partial knee replacement  Objective Pain level ranges 2-8/10  AROM: -5 - 50 degrees  Strength: 2/5 quad and hamstrings  Gait: RW, R knee extension lag, decreased heel strike, step-to pattern  Swelling: Mod (will continue to monitor)  Incisions: Clean and healing well (as musch as can be seen with bandage covering, no signs/sx of infection   Will continue to monitor)  Isa's sign: (-) (patient instructed on signs and sx to watch for and to get to ED ASAP if noticing signs/sx)  Unable to walk without pain and limitation  Unable to squat without pain and limitation  Unable complete transfers without pain and limitation  Unable to ascend/descend stairs without pain and limitation  Unable to kneel without pain and limitation        Precautions: R partial knee replacement 1/15/19    Daily Treatment Diary    HEP: Sheet provided and discussed    Manual  19            ART x15'            IASTM             JM             PROM and LE stretch             STM               Exercise Diary  19            TM             Bike             NuStep             Seated Towel Calf Stretch 6x20'' Standing 1/2 Roll calf stretch 6x20''            SL Ecc HR             HR/TR             TB TKE 3x10 L3            TB Heel to Toes             Forward/backward heel to toe walk             Sit to stand             No shoe AE [employer] with Add squeeze             SL Bridge             Clam shells             SL Sit to Stand             BOSU SLB             Upside down BOSU SL squat holds             TB Lat Walks             Step ups/downs                                                    Ankle Pumps 3x10            QS Back and forth 3x10            HS into QS 3x10                Modalities  1/16/19            MHP             CP Heel propped x12'            Stim

## 2019-01-16 NOTE — PROGRESS NOTES
PT Evaluation     Today's date: 2019  Patient name: Zander Cesar  : 1965  MRN: [id number]  Referring provider: Audrey Askew DO  Dx:   Encounter Diagnosis     ICD-10-CM    1  Presence of right artificial knee joint Z96 651                   Assessment  Understanding of Dx/Px/POC: good   Prognosis: good    Goals  STGs: To be complete within 4 weeks  - Decrease pain to < 2/10 at worst  - Increase AROM to WNL  - Increase strength to > 4+/5  - Improve gait to WNL for distances < 6 blocks    - Able to walk for any extended amount of time/distance without pain or limitation for increased safety and functional capacity with ADLs and work-related duty  - Able to repetitively squat without pain or limitation for increased safety and functional capacity with ADLs and work-related duty  - Able to repetitively ascend/descend a full flight of stairs without pain or limitation for increased safety and functional capacity with ADLs and work-related duty  - Able to repetitively complete transfers without pain or limitation for increased safety and functional capacity with ADLs and work-related duty  - Able to keel for any extended amount of time without pain or limitation for increased safety and functional capacity with ADLs and work-related duty    Plan  Frequency: 3x week  Duration in weeks: 4         Pt is a very 48 y o  male with R knee pain who presents with functional deficits including decreased capacity with walking, squatting, kneeling, stairs, and transfers  Upon completion of today's initial evaluation, Ancelmo's sx remain consistent with being s/p R lateral partial knee replacement 1/15/19  Patient will benefit from skilled physical therapy to address current deficits  Subjective Evaluation    Pain  Current pain ratin  At best pain ratin  At worst pain ratin  Location: R Knee           Pt reports he is 1 day s/p R lateral partial knee replacement      Objective Pain level ranges 2-8/10  AROM: -5 - 50 degrees  Strength: 2/5 quad and hamstrings  Gait: RW, R knee extension lag, decreased heel strike, step-to pattern  Swelling: Mod (will continue to monitor)  Incisions: Clean and healing well (as musch as can be seen with bandage covering, no signs/sx of infection   Will continue to monitor)  Isa's sign: (-) (patient instructed on signs and sx to watch for and to get to ED ASAP if noticing signs/sx)  Unable to walk without pain and limitation  Unable to squat without pain and limitation  Unable complete transfers without pain and limitation  Unable to ascend/descend stairs without pain and limitation  Unable to kneel without pain and limitation        Precautions: R partial knee replacement 1/15/19    Daily Treatment Diary    HEP: Sheet provided and discussed    Manual  1/16/19            ART x15'            IASTM             JM             PROM and LE stretch             STM               Exercise Diary  1/16/19            TM             Bike             NuStep             Seated Towel Calf Stretch 6x20''            Standing 1/2 Roll calf stretch 6x20''            SL Ecc HR             HR/TR             TB TKE 3x10 L3            TB Heel to Toes             Forward/backward heel to toe walk             Sit to stand             No shoe AE [employer] with Add squeeze             SL Bridge             Clam shells             SL Sit to Stand             BOSU SLB             Upside down BOSU SL squat holds             TB Lat Walks             Step ups/downs                                                    Ankle Pumps 3x10            QS Back and forth 3x10            HS into QS 3x10                Modalities  1/16/19            MHP             CP Heel propped x12'            Stim

## 2019-01-21 ENCOUNTER — OFFICE VISIT (OUTPATIENT)
Dept: PHYSICAL THERAPY | Facility: CLINIC | Age: 54
End: 2019-01-21
Payer: COMMERCIAL

## 2019-01-21 DIAGNOSIS — Z96.651 PRESENCE OF RIGHT ARTIFICIAL KNEE JOINT: Primary | ICD-10-CM

## 2019-01-21 DIAGNOSIS — S76.011D MUSCLE STRAIN OF GLUTEAL REGION, RIGHT, SUBSEQUENT ENCOUNTER: ICD-10-CM

## 2019-01-21 PROCEDURE — 97530 THERAPEUTIC ACTIVITIES: CPT | Performed by: PHYSICAL THERAPIST

## 2019-01-21 PROCEDURE — 97010 HOT OR COLD PACKS THERAPY: CPT | Performed by: PHYSICAL THERAPIST

## 2019-01-21 PROCEDURE — 97110 THERAPEUTIC EXERCISES: CPT | Performed by: PHYSICAL THERAPIST

## 2019-01-21 PROCEDURE — 97140 MANUAL THERAPY 1/> REGIONS: CPT | Performed by: PHYSICAL THERAPIST

## 2019-01-21 PROCEDURE — 97112 NEUROMUSCULAR REEDUCATION: CPT | Performed by: PHYSICAL THERAPIST

## 2019-01-21 NOTE — PROGRESS NOTES
Daily Note     Today's date: 2019  Patient name: Margot Nina  : 1965  MRN: [id number]  Referring provider: Prisca Saenz DO  Dx:   Encounter Diagnosis     ICD-10-CM    1  Presence of right artificial knee joint Z96 651    2  Muscle strain of gluteal region, right, subsequent encounter S76 011D                   Subjective: Pt reports he is progressing appropriately as per protocol  Knee feels much better than last week  HEP going well  Anxious to continue making progress  Objective: See treatment diary below      Assessment: Tolerated treatment well  Patient demonstrated fatigue post treatment, exhibited good technique with therapeutic exercises and would benefit from continued PT      Plan: Continue per plan of care  Progress treatment as tolerated        Precautions: R partial knee replacement 1/15/19    Daily Treatment Diary    HEP: Sheet provided and discussed    Manual  19            ART x15'            IASTM             JM             PROM and LE stretch             STM               Exercise Diary  19            TM x8'            Bike x8'            NuStep             Seated Towel Calf Stretch 6x20''            Standing 1/2 Roll calf stretch 6x20''            SL Ecc HR             HR/TR 3x10            TB TKE 3x10 L4            TB Heel to Toes 1x10 L2            Forward/backward heel to toe walk             Sit to stand             No shoe AE [employer] with Add squeeze             SL Bridge             Clam shells             SL Sit to Stand             BOSU SLB             Upside down BOSU SL squat holds             TB Lat Walks 2x10 [employer] L2            Step ups/downs                                                    Ankle Pumps 3x10            QS Back and forth 3x10            HS into QS 3x10                Modalities  19            MHP             CP Heel propped x12'            Stim

## 2019-01-23 ENCOUNTER — OFFICE VISIT (OUTPATIENT)
Dept: PHYSICAL THERAPY | Facility: CLINIC | Age: 54
End: 2019-01-23
Payer: COMMERCIAL

## 2019-01-23 DIAGNOSIS — Z96.651 PRESENCE OF RIGHT ARTIFICIAL KNEE JOINT: Primary | ICD-10-CM

## 2019-01-23 PROCEDURE — 97112 NEUROMUSCULAR REEDUCATION: CPT | Performed by: PHYSICAL THERAPIST

## 2019-01-23 PROCEDURE — 97530 THERAPEUTIC ACTIVITIES: CPT | Performed by: PHYSICAL THERAPIST

## 2019-01-23 PROCEDURE — 97110 THERAPEUTIC EXERCISES: CPT | Performed by: PHYSICAL THERAPIST

## 2019-01-23 PROCEDURE — 97140 MANUAL THERAPY 1/> REGIONS: CPT | Performed by: PHYSICAL THERAPIST

## 2019-01-23 PROCEDURE — 97010 HOT OR COLD PACKS THERAPY: CPT | Performed by: PHYSICAL THERAPIST

## 2019-01-23 NOTE — PROGRESS NOTES
Daily Note     Today's date: 2019  Patient name: Alvaro Robertson  : 1965  MRN: [id number]  Referring provider: Anjelica Cruz DO  Dx:   Encounter Diagnosis     ICD-10-CM    1  Presence of right artificial knee joint Z96 651                   Subjective: Pt reports HEP going well and R knee is progressing well  However, reports his R Sciatica is flariring up  Causing significant upper R butotck pain, mod to severe lateral limping, and decreased R LE strength  Objective: See treatment diary below      Assessment: Tolerated treatment fair  Patient had a difficult time completing all exercises secondary to present state of R side sciatica  Will continue to monitor and progress as indicated  Patient demonstrated fatigue post treatment, exhibited good technique with therapeutic exercises and would benefit from continued PT      Plan: Continue per plan of care  Progress treatment as tolerated        Precautions: R partial knee replacement 1/15/19    Daily Treatment Diary    HEP: Sheet provided and discussed    Manual  19            ART x15'            IASTM             JM             PROM and LE stretch             STM               Exercise Diary  19            TM x8'            Bike x8'            NuStep             Seated Towel Calf Stretch 6x20''            Standing 1/2 Roll calf stretch 6x20''            SL Ecc HR             HR/TR 3x10            TB TKE 3x10 L5            TB Heel to Toes 3x5 L2            Forward/backward heel to toe walk 3x10            Sit to stand             No shoe AE [employer] with Add squeeze             SL Bridge             Clam shells             SL Sit to Stand             BOSU SLB             Upside down BOSU SL squat holds             TB Lat Walks 2x10 P-Bars L2            Step ups/downs             Prone R LE hangs 5x1'            Prone hip extensions 3x10            SL Hip Abd 3x10            Prone press ups 2x10            Ankle Pumps 3x10            QS Back and forth 3x10            HS into QS 3x10                Modalities  1/23/19            MHP             CP Prone hang position x12'            Stim

## 2019-01-24 ENCOUNTER — OFFICE VISIT (OUTPATIENT)
Dept: PHYSICAL THERAPY | Facility: CLINIC | Age: 54
End: 2019-01-24
Payer: COMMERCIAL

## 2019-01-24 DIAGNOSIS — Z96.651 PRESENCE OF RIGHT ARTIFICIAL KNEE JOINT: Primary | ICD-10-CM

## 2019-01-24 DIAGNOSIS — S76.011D MUSCLE STRAIN OF GLUTEAL REGION, RIGHT, SUBSEQUENT ENCOUNTER: ICD-10-CM

## 2019-01-24 PROCEDURE — 97112 NEUROMUSCULAR REEDUCATION: CPT | Performed by: PHYSICAL THERAPIST

## 2019-01-24 PROCEDURE — 97530 THERAPEUTIC ACTIVITIES: CPT | Performed by: PHYSICAL THERAPIST

## 2019-01-24 PROCEDURE — 97140 MANUAL THERAPY 1/> REGIONS: CPT | Performed by: PHYSICAL THERAPIST

## 2019-01-24 PROCEDURE — 97010 HOT OR COLD PACKS THERAPY: CPT | Performed by: PHYSICAL THERAPIST

## 2019-01-24 PROCEDURE — 97110 THERAPEUTIC EXERCISES: CPT | Performed by: PHYSICAL THERAPIST

## 2019-01-24 NOTE — PROGRESS NOTES
Daily Note     Today's date: 2019  Patient name: Everardo Mares  : 1965  MRN: [id number]  Referring provider: Annette Martins DO  Dx:   Encounter Diagnosis     ICD-10-CM    1  Presence of right artificial knee joint Z96 651    2  Muscle strain of gluteal region, right, subsequent encounter S76 011D                   Subjective: Pt reports HEP going well and R knee is progressing well  However, reports his R Sciatica continues to flare up, although improved today from yesterday  Causing significant upper R buttock pain, mod to severe lateral limping, and decreased R LE strength  Objective: See treatment diary below      Assessment: Tolerated treatment fair  Patient had a difficult time completing all exercises secondary to present state of R side sciatica  Will continue to monitor and progress as indicated  Patient demonstrated fatigue post treatment, exhibited good technique with therapeutic exercises and would benefit from continued PT      Plan: Continue per plan of care  Progress treatment as tolerated        Precautions: R partial knee replacement 1/15/19    Daily Treatment Diary    HEP: Sheet provided and discussed    Manual  19            ART x15'            IASTM             JM             PROM and LE stretch             STM               Exercise Diary  19            TM x8'            Bike x8'            NuStep             Seated Towel Calf Stretch 6x20''            Standing 1/2 Roll calf stretch 6x20''            SL Ecc HR             HR/TR 3x10            TB TKE 3x10 L5            TB Heel to Toes 3x5 L2            Forward/backward heel to toe walk 3x10            Sit to stand             No shoe AE [employer] with Add squeeze             SL Bridge             Clam shells             SL Sit to Stand             BOSU SLB             Upside down BOSU SL squat holds             TB Lat Walks 2x10 P-Bars L2            Step ups/downs             Prone R LE hangs 5x1'            Prone hip extensions 3x10            SL Hip Abd 3x10            Prone press ups 2x10            Ankle Pumps 3x10            QS Back and forth 3x10            HS into QS 3x10                Modalities  1/24/19            MHP             CP Prone hang position x12'            Stim

## 2019-01-28 ENCOUNTER — OFFICE VISIT (OUTPATIENT)
Dept: PHYSICAL THERAPY | Facility: CLINIC | Age: 54
End: 2019-01-28
Payer: COMMERCIAL

## 2019-01-28 DIAGNOSIS — Z96.651 PRESENCE OF RIGHT ARTIFICIAL KNEE JOINT: Primary | ICD-10-CM

## 2019-01-28 DIAGNOSIS — S76.011D MUSCLE STRAIN OF GLUTEAL REGION, RIGHT, SUBSEQUENT ENCOUNTER: ICD-10-CM

## 2019-01-28 PROCEDURE — 97140 MANUAL THERAPY 1/> REGIONS: CPT | Performed by: PHYSICAL THERAPIST

## 2019-01-28 PROCEDURE — 97010 HOT OR COLD PACKS THERAPY: CPT | Performed by: PHYSICAL THERAPIST

## 2019-01-28 PROCEDURE — 97530 THERAPEUTIC ACTIVITIES: CPT | Performed by: PHYSICAL THERAPIST

## 2019-01-28 PROCEDURE — 97110 THERAPEUTIC EXERCISES: CPT | Performed by: PHYSICAL THERAPIST

## 2019-01-28 PROCEDURE — 97112 NEUROMUSCULAR REEDUCATION: CPT | Performed by: PHYSICAL THERAPIST

## 2019-01-28 NOTE — PROGRESS NOTES
Daily Note     Today's date: 2019  Patient name: Osei Peña  : 1965  MRN: [id number]  Referring provider: Teodora Rick DO  Dx:   Encounter Diagnosis     ICD-10-CM    1  Presence of right artificial knee joint Z96 651    2  Muscle strain of gluteal region, right, subsequent encounter S76 011D                   Subjective: Pt reports HEP going well and R knee is progressing well  However, reports his R Sciatica continues to flare up, although improved today from last week  Anxious to continue making progress  Objective: See treatment diary below      Assessment: Tolerated treatment well  Patient had a difficult time completing all exercises secondary to present state of R side sciatica  Will continue to monitor and progress as indicated  Patient demonstrated fatigue post treatment, exhibited good technique with therapeutic exercises and would benefit from continued PT      Plan: Continue per plan of care  Progress treatment as tolerated        Precautions: R partial knee replacement 1/15/19    Daily Treatment Diary    HEP: Sheet provided and discussed    Manual  19            ART x15'            IASTM             JM             PROM and LE stretch             STM               Exercise Diary  19            TM x8'            Bike x8'            NuStep             Seated Towel Calf Stretch 6x20''            Standing 1/2 Roll calf stretch 6x20''            SL Ecc HR             HR/TR 3x10            TB TKE 3x10 L5            TB Heel to Toes 3x5 L2            Forward/backward heel to toe walk 3x10            Sit to stand             No shoe AE [employer] with Add squeeze             SL Bridge             Clam shells             SL Sit to Stand             BOSU SLB             Upside down BOSU SL squat holds             TB Lat Walks 2x10 P-Bars L2            Step ups/downs             Prone R LE hangs 5x1'            Prone hip extensions 3x10            SL Hip Abd 3x10            Prone press ups 2x10            Ankle Pumps 3x10            QS Back and forth 3x10            HS into QS 3x10                Modalities  1/28/19            MHP             CP Prone hang position x12'            Stim

## 2019-01-29 ENCOUNTER — APPOINTMENT (OUTPATIENT)
Dept: PHYSICAL THERAPY | Facility: CLINIC | Age: 54
End: 2019-01-29
Payer: COMMERCIAL

## 2019-01-30 ENCOUNTER — OFFICE VISIT (OUTPATIENT)
Dept: PHYSICAL THERAPY | Facility: CLINIC | Age: 54
End: 2019-01-30
Payer: COMMERCIAL

## 2019-01-30 DIAGNOSIS — S76.011D MUSCLE STRAIN OF GLUTEAL REGION, RIGHT, SUBSEQUENT ENCOUNTER: ICD-10-CM

## 2019-01-30 DIAGNOSIS — Z96.651 PRESENCE OF RIGHT ARTIFICIAL KNEE JOINT: Primary | ICD-10-CM

## 2019-01-30 PROCEDURE — 97112 NEUROMUSCULAR REEDUCATION: CPT | Performed by: PHYSICAL THERAPIST

## 2019-01-30 PROCEDURE — 97010 HOT OR COLD PACKS THERAPY: CPT | Performed by: PHYSICAL THERAPIST

## 2019-01-30 PROCEDURE — 97110 THERAPEUTIC EXERCISES: CPT | Performed by: PHYSICAL THERAPIST

## 2019-01-30 PROCEDURE — 97140 MANUAL THERAPY 1/> REGIONS: CPT | Performed by: PHYSICAL THERAPIST

## 2019-01-30 PROCEDURE — 97530 THERAPEUTIC ACTIVITIES: CPT | Performed by: PHYSICAL THERAPIST

## 2019-01-30 NOTE — PROGRESS NOTES
Daily Note     Today's date: 2019  Patient name: Everardo Mares  : 1965  MRN: [id number]  Referring provider: Annette Martins DO  Dx:   Encounter Diagnosis     ICD-10-CM    1  Presence of right artificial knee joint Z96 651    2  Muscle strain of gluteal region, right, subsequent encounter S76 103D                   Subjective: Pt reports HEP going well and R knee is progressing overall  Reports the posterior/lateral portion of the knee continues to bother him the most  Reports his R Sciatica continues to flare up, although improving each day from last week  Anxious to continue making progress  Objective: See treatment diary below      Assessment: Tolerated treatment well  Patient had a difficult time completing all exercises secondary to present state of R side sciatica  Will continue to monitor and progress as indicated  Patient demonstrated fatigue post treatment, exhibited good technique with therapeutic exercises and would benefit from continued PT      Plan: Continue per plan of care  Progress treatment as tolerated        Precautions: R partial knee replacement 1/15/19    Daily Treatment Diary    HEP: Sheet provided and discussed    Manual  19            ART x20'            IASTM             JM             PROM and LE stretch             STM               Exercise Diary  19            TM x8'            Bike x8'            NuStep             Seated Towel Calf Stretch 6x20''            Standing 1/2 Roll calf stretch 6x20''            SL Ecc HR             HR/TR 3x10            TB TKE 3x10 L5            TB Heel to Toes 3x5 L2            Forward/backward heel to toe walk 3x10            Sit to stand             No shoe AE [employer] with Add squeeze             SL Bridge             Clam shells             SL Sit to Stand             BOSU SLB             Upside down BOSU SL squat holds             TB Lat Walks 2x60' P-Bars L2            Step ups/downs 3x10 6'' Prone R LE hangs 5x1'            Prone hip extensions 3x10            SL Hip Abd 3x10            Prone press ups 2x10            Ankle Pumps 3x10            QS Back and forth 3x10            HS into QS 3x10                Modalities  1/30/19            MHP             CP Prone hang position x12'            Stim

## 2019-01-31 ENCOUNTER — APPOINTMENT (OUTPATIENT)
Dept: PHYSICAL THERAPY | Facility: CLINIC | Age: 54
End: 2019-01-31
Payer: COMMERCIAL

## 2019-02-01 ENCOUNTER — OFFICE VISIT (OUTPATIENT)
Dept: PHYSICAL THERAPY | Facility: CLINIC | Age: 54
End: 2019-02-01
Payer: COMMERCIAL

## 2019-02-01 DIAGNOSIS — Z96.651 PRESENCE OF RIGHT ARTIFICIAL KNEE JOINT: Primary | ICD-10-CM

## 2019-02-01 DIAGNOSIS — S76.011D MUSCLE STRAIN OF GLUTEAL REGION, RIGHT, SUBSEQUENT ENCOUNTER: ICD-10-CM

## 2019-02-01 PROCEDURE — 97010 HOT OR COLD PACKS THERAPY: CPT | Performed by: PHYSICAL THERAPIST

## 2019-02-01 PROCEDURE — 97140 MANUAL THERAPY 1/> REGIONS: CPT | Performed by: PHYSICAL THERAPIST

## 2019-02-01 PROCEDURE — 97112 NEUROMUSCULAR REEDUCATION: CPT | Performed by: PHYSICAL THERAPIST

## 2019-02-01 PROCEDURE — 97110 THERAPEUTIC EXERCISES: CPT | Performed by: PHYSICAL THERAPIST

## 2019-02-01 PROCEDURE — 97530 THERAPEUTIC ACTIVITIES: CPT | Performed by: PHYSICAL THERAPIST

## 2019-02-01 NOTE — PROGRESS NOTES
Daily Note     Today's date: 2019  Patient name: Osei Peña  : 1965  MRN: [id number]  Referring provider: Teodora Rick DO  Dx:   Encounter Diagnosis     ICD-10-CM    1  Presence of right artificial knee joint Z96 651    2  Muscle strain of gluteal region, right, subsequent encounter S76 436D                   Subjective: Pt reports HEP going well and R knee is progressing overall  Reports the posterior/lateral portion of the knee continues to bother him the most  Reports his R Sciatica continues to flare up, although improving each day from last week  Anxious to continue making progress  Objective: See treatment diary below      Assessment: Tolerated treatment well  Patient had a difficult time completing all exercises secondary to present state of R side sciatica  Will continue to monitor and progress as indicated  Patient demonstrated fatigue post treatment, exhibited good technique with therapeutic exercises and would benefit from continued PT      Plan: Continue per plan of care  Progress treatment as tolerated        Precautions: R partial knee replacement 1/15/19    Daily Treatment Diary    HEP: Sheet provided and discussed    Manual  19            ART x20'            IASTM             JM             PROM and LE stretch             STM               Exercise Diary  19            TM x8'            Bike x8'            NuStep             Seated Towel Calf Stretch 6x20''            Standing 1/2 Roll calf stretch 6x20''            SL Ecc HR             HR/TR 3x10            TB TKE 3x10 L5            TB Heel to Toes 3x5 L2            Forward/backward heel to toe walk 3x10            Sit to stand 3x10            No shoe AE [employer]             Bridge with Add squeeze 3x10            SL Bridge             Clam shells             SL PB Pull Ins 3x10            BOSU SLB             Upside down BOSU SL squat holds             TB Lat Walks 2x60' P-Bars L2            Step ups/downs 3x10 6''            Prone R LE hangs 5x1'            Prone hip extensions 3x10            SL Hip Abd 3x10            Prone press ups 2x10            Ankle Pumps 3x10            QS Back and forth 3x10            HS into QS 3x10                Modalities  2/1/19            MHP             CP Prone hang position x12'            Stim

## 2019-02-04 ENCOUNTER — OFFICE VISIT (OUTPATIENT)
Dept: PHYSICAL THERAPY | Facility: CLINIC | Age: 54
End: 2019-02-04
Payer: COMMERCIAL

## 2019-02-04 DIAGNOSIS — Z96.651 PRESENCE OF RIGHT ARTIFICIAL KNEE JOINT: Primary | ICD-10-CM

## 2019-02-04 DIAGNOSIS — S76.011D MUSCLE STRAIN OF GLUTEAL REGION, RIGHT, SUBSEQUENT ENCOUNTER: ICD-10-CM

## 2019-02-04 PROCEDURE — 97110 THERAPEUTIC EXERCISES: CPT | Performed by: PHYSICAL THERAPIST

## 2019-02-04 PROCEDURE — 97010 HOT OR COLD PACKS THERAPY: CPT | Performed by: PHYSICAL THERAPIST

## 2019-02-04 PROCEDURE — 97530 THERAPEUTIC ACTIVITIES: CPT | Performed by: PHYSICAL THERAPIST

## 2019-02-04 PROCEDURE — 97112 NEUROMUSCULAR REEDUCATION: CPT | Performed by: PHYSICAL THERAPIST

## 2019-02-04 PROCEDURE — 97140 MANUAL THERAPY 1/> REGIONS: CPT | Performed by: PHYSICAL THERAPIST

## 2019-02-04 NOTE — LETTER
2019    [place]   [address] [id number]    Patient: Everardo Mares  YOB: 1965   Date of Visit: 2019     Encounter Diagnosis     ICD-10-CM    1  Presence of right artificial knee joint Z96 651    2  Muscle strain of gluteal region, right, subsequent encounter S76 011D        Dear Dr Jona Cedeño:    Please review the attached Plan of Care from [facility] recent visit  Please verify that you agree therapy should continue by signing the attached document and sending it back to our office  If you have any questions or concerns, please don't hesitate to call  Sincerely,    Amadou Mustafa, PT, DPT, ATC, ART      Referring Provider:      I certify that I have read the below Plan of Care and certify the need for these services furnished under this plan of treatment while under my care  Petrona Ellis DO  [address]  [address] [place] [address] [facility]: [phone]          PT Re-Evaluation     Today's date: 2019  Patient name: Everardo Mares  : 1965  MRN: [id number]  Referring provider: Annette Martins DO  Dx:   Encounter Diagnosis     ICD-10-CM    1  Presence of right artificial knee joint Z96 651    2   Muscle strain of gluteal region, right, subsequent encounter S76 011D                   Assessment  Understanding of Dx/Px/POC: good   Prognosis: good    Goals  STGs: To be complete within 4 weeks  - Decrease pain to < 2/10 at worst (partially met)  - Increase AROM to WNL (partially met)  - Increase strength to > 4+/5 (partially met)  - Improve gait to WNL for distances < 6 blocks (partially met)    - Able to walk for any extended amount of time/distance without pain or limitation for increased safety and functional capacity with ADLs and work-related duty (partially met)  - Able to repetitively squat without pain or limitation for increased safety and functional capacity with ADLs and work-related duty (partially met)  - Able to repetitively ascend/descend a full flight of stairs without pain or limitation for increased safety and functional capacity with ADLs and work-related duty (partially met)  - Able to repetitively complete transfers without pain or limitation for increased safety and functional capacity with ADLs and work-related duty (partially met)  - Able to keel for any extended amount of time without pain or limitation for increased safety and functional capacity with ADLs and work-related duty (partially met)    Plan  Frequency: 3x week  Duration in weeks: 4         Upon completion of today's re-evaluation, as evidenced by present objective and subjective measures Ancelmo's sx remain consistent with continued progress from being s/p R lateral partial knee replacement 1/15/19  Although patient suffered a setback over the weekend, his objective measures indicated improving quad and HS strength and knee AROM  Pt swelling worse coming in today in comparison to previous sessions  Will continue to monitor and patient has follow up visit scheduled with surgeon 19  Patient will benefit from continued skilled physical therapy to address current deficits  Subjective Evaluation    Pain  Current pain ratin  At best pain ratin  At worst pain ratin  Location: R Knee           Manuelito Poplar came in to physical therapy today reporting he twisted his R knee 3 days ago, while walking  Pt reports immediate significant pain and swelling  Pt reports pain and swelling have improved some since twisting the knee a few days ago, but still bothering him enough to cause significant limp  Also reports his postero-lateral R hip pain has continued become increasingly worse, which is negatively effecting his overall safety and functional mechanics with walking and ADLs      Objective Pain level ranges 1-8/10  AROM: 0 - 115 degrees  Strength: 3/5 quad and hamstrings  Gait: Mild to Mod lateral limp/trendelenberg, R knee extension lag  Swelling: Mod (will continue to monitor)  Incisions: Clean and healing well (no signs/sx of infection   Will continue to monitor)  Isa's sign: (-) (patient instructed on signs and sx to watch for and to get to ED ASAP if noticing signs/sx)  Unable to walk without pain and limitation, but improving  Unable to squat without pain and limitation, but improving  Unable complete transfers without pain and limitation, but improving  Unable to ascend/descend stairs without pain and limitation, but improving  Unable to kneel without pain and limitation, but improving         Precautions: R partial knee replacement 1/15/19    Daily Treatment Diary    HEP: Sheet provided and discussed    Manual  2/4/19            ART x20'            IASTM             JM             PROM and LE stretch             STM               Exercise Diary  2/4/19            TM x8'            Bike x8'            NuStep             Seated Towel Calf Stretch 6x20''            Standing 1/2 Roll calf stretch 6x20''            SL Ecc HR             HR/TR 3x10            TB TKE 3x10 L5            TB Heel to Toes 3x5 L2            Forward/backward heel to toe walk 3x10            Sit to stand 3x10            No shoe AE [employer]             Bridge with Add squeeze 3x10            SL Bridge             Clam shells             SL PB Pull Ins 3x10            BOSU SLB             Upside down BOSU SL squat holds             TB Lat Walks 2x60' P-Bars L2            Step ups/downs 3x10 6''            Prone R LE hangs 5x1'            Prone hip extensions 3x10            SL Hip Abd 3x10            Prone press ups 2x10            Ankle Pumps 3x10            QS Back and forth 3x10            HS into QS 3x10                Modalities  2/4/19            MHP             CP Prone hang position x12'            Stim

## 2019-02-04 NOTE — PROGRESS NOTES
PT Re-Evaluation     Today's date: 2019  Patient name: Lin Hearn  : 1965  MRN: [id number]  Referring provider: Aida Schuler DO  Dx:   Encounter Diagnosis     ICD-10-CM    1  Presence of right artificial knee joint Z96 651    2  Muscle strain of gluteal region, right, subsequent encounter S76 011D                   Assessment  Understanding of Dx/Px/POC: good   Prognosis: good    Goals  STGs: To be complete within 4 weeks  - Decrease pain to < 2/10 at worst (partially met)  - Increase AROM to WNL (partially met)  - Increase strength to > 4+/5 (partially met)  - Improve gait to WNL for distances < 6 blocks (partially met)    - Able to walk for any extended amount of time/distance without pain or limitation for increased safety and functional capacity with ADLs and work-related duty (partially met)  - Able to repetitively squat without pain or limitation for increased safety and functional capacity with ADLs and work-related duty (partially met)  - Able to repetitively ascend/descend a full flight of stairs without pain or limitation for increased safety and functional capacity with ADLs and work-related duty (partially met)  - Able to repetitively complete transfers without pain or limitation for increased safety and functional capacity with ADLs and work-related duty (partially met)  - Able to keel for any extended amount of time without pain or limitation for increased safety and functional capacity with ADLs and work-related duty (partially met)    Plan  Frequency: 3x week  Duration in weeks: 4         Upon completion of today's re-evaluation, as evidenced by present objective and subjective measures Ancelmo's sx remain consistent with continued progress from being s/p R lateral partial knee replacement 1/15/19  Although patient suffered a setback over the weekend, his objective measures indicated improving quad and HS strength and knee AROM   Pt swelling worse coming in today in comparison to previous sessions  Will continue to monitor and patient has follow up visit scheduled with surgeon 19  Patient will benefit from continued skilled physical therapy to address current deficits  Subjective Evaluation    Pain  Current pain ratin  At best pain ratin  At worst pain ratin  Location: R Knee           Jillene Few came in to physical therapy today reporting he twisted his R knee 3 days ago, while walking  Pt reports immediate significant pain and swelling  Pt reports pain and swelling have improved some since twisting the knee a few days ago, but still bothering him enough to cause significant limp  Also reports his postero-lateral R hip pain has continued become increasingly worse, which is negatively effecting his overall safety and functional mechanics with walking and ADLs  Objective Pain level ranges 1-8/10  AROM: 0 - 115 degrees  Strength: 3/5 quad and hamstrings  Gait: Mild to Mod lateral limp/trendelenberg, R knee extension lag  Swelling: Mod (will continue to monitor)  Incisions: Clean and healing well (no signs/sx of infection   Will continue to monitor)  Isa's sign: (-) (patient instructed on signs and sx to watch for and to get to ED ASAP if noticing signs/sx)  Unable to walk without pain and limitation, but improving  Unable to squat without pain and limitation, but improving  Unable complete transfers without pain and limitation, but improving  Unable to ascend/descend stairs without pain and limitation, but improving  Unable to kneel without pain and limitation, but improving         Precautions: R partial knee replacement 1/15/19    Daily Treatment Diary    HEP: Sheet provided and discussed    Manual  19            ART x20'            IASTM             JM             PROM and LE stretch             STM               Exercise Diary  19            TM x8'            Bike x8'            NuStep             Seated Towel Calf Stretch 6x20''            Standing 1/2 Roll calf stretch 6x20''            SL Ecc HR             HR/TR 3x10            TB TKE 3x10 L5            TB Heel to Toes 3x5 L2            Forward/backward heel to toe walk 3x10            Sit to stand 3x10            No shoe AE [employer]             Bridge with Add squeeze 3x10            SL Bridge             Clam shells             SL PB Pull Ins 3x10            BOSU SLB             Upside down BOSU SL squat holds             TB Lat Walks 2x60' P-Bars L2            Step ups/downs 3x10 6''            Prone R LE hangs 5x1'            Prone hip extensions 3x10            SL Hip Abd 3x10            Prone press ups 2x10            Ankle Pumps 3x10            QS Back and forth 3x10            HS into QS 3x10                Modalities  2/4/19            MHP             CP Prone hang position x12'            Stim

## 2019-02-05 ENCOUNTER — APPOINTMENT (OUTPATIENT)
Dept: PHYSICAL THERAPY | Facility: CLINIC | Age: 54
End: 2019-02-05
Payer: COMMERCIAL

## 2019-02-06 ENCOUNTER — APPOINTMENT (OUTPATIENT)
Dept: PHYSICAL THERAPY | Facility: CLINIC | Age: 54
End: 2019-02-06
Payer: COMMERCIAL

## 2019-02-08 ENCOUNTER — OFFICE VISIT (OUTPATIENT)
Dept: PHYSICAL THERAPY | Facility: CLINIC | Age: 54
End: 2019-02-08
Payer: COMMERCIAL

## 2019-02-08 ENCOUNTER — OFFICE VISIT (OUTPATIENT)
Dept: FAMILY MEDICINE CLINIC | Facility: CLINIC | Age: 54
End: 2019-02-08
Payer: COMMERCIAL

## 2019-02-08 VITALS
BODY MASS INDEX: 39.08 KG/M2 | SYSTOLIC BLOOD PRESSURE: 128 MMHG | DIASTOLIC BLOOD PRESSURE: 84 MMHG | WEIGHT: 273 LBS | HEIGHT: 70 IN

## 2019-02-08 DIAGNOSIS — S76.011D MUSCLE STRAIN OF GLUTEAL REGION, RIGHT, SUBSEQUENT ENCOUNTER: ICD-10-CM

## 2019-02-08 DIAGNOSIS — Z96.651 PRESENCE OF RIGHT ARTIFICIAL KNEE JOINT: Primary | ICD-10-CM

## 2019-02-08 DIAGNOSIS — M54.31 RIGHT SIDED SCIATICA: Primary | ICD-10-CM

## 2019-02-08 PROCEDURE — 1036F TOBACCO NON-USER: CPT | Performed by: PHYSICIAN ASSISTANT

## 2019-02-08 PROCEDURE — 97010 HOT OR COLD PACKS THERAPY: CPT

## 2019-02-08 PROCEDURE — 97110 THERAPEUTIC EXERCISES: CPT

## 2019-02-08 PROCEDURE — 97140 MANUAL THERAPY 1/> REGIONS: CPT

## 2019-02-08 PROCEDURE — 3008F BODY MASS INDEX DOCD: CPT | Performed by: PHYSICIAN ASSISTANT

## 2019-02-08 PROCEDURE — 99213 OFFICE O/P EST LOW 20 MIN: CPT | Performed by: PHYSICIAN ASSISTANT

## 2019-02-08 RX ORDER — CELECOXIB 200 MG/1
200 CAPSULE ORAL DAILY
COMMUNITY
End: 2019-05-16 | Stop reason: ALTCHOICE

## 2019-02-08 NOTE — PROGRESS NOTES
Assessment/Plan:    Will check xray imaging and likely order MRI in future  Diagnoses and all orders for this visit:    Right sided sciatica  -     XR spine lumbar minimum 4 views non injury; Future    Other orders  -     celecoxib (CeleBREX) 200 mg capsule; Take 200 mg by mouth daily          Subjective:      Patient ID: Margot Nina  is a 48 y o  male  Perla Tobar is here today complaining of pain in his R buttock x months  Pain radiates into R leg  He's been going to physical therapy without relief  He has not had any recent imaging of his low back  Per patient, PT advises he get an MRI of the area  The following portions of the patient's history were reviewed and updated as appropriate:   He  has a past medical history of Bee allergy status; Benign lipomatous neoplasm; Cellulitis of mid back region; High risk medication use; Internal derangement of knee, left; Pelvic cramping; Prostatism; and Sebaceous cyst   He   Patient Active Problem List    Diagnosis Date Noted    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    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    Knee pain 04/27/2018    Osteoarthritis 04/27/2018    Osteoarthritis of hip 04/27/2018    Osteoarthritis of knee 04/27/2018    Tenosynovitis 04/27/2018    Shoulder joint pain 04/27/2018    Pain in elbow 04/27/2018    Injury of wrist 04/27/2018    Hip pain 04/27/2018    Enthesopathy of wrist 04/27/2018    Disorder of wrist joint 04/27/2018    Disorder of shoulder 04/27/2018    Tear of lateral cartilage or meniscus of knee, current 04/27/2018    Disorder of bursae of shoulder region 04/27/2018    Articular cartilage disorder of wrist 04/27/2018     He  has a past surgical history that includes ARTHROSCOPY ELBOW (Left, 2003); ARTHROSCOPY KNEE (Right); Shoulder arthroscopy (Left, 2003); Shoulder arthroscopy (Right, 04/2012); TONSILLECTOMY; Wrist surgery (Left); Knee surgery (Left); and Hernia repair    His family history includes Asthma in his mother; Diabetes type II in his maternal grandfather  He  reports that he has never smoked  He quit smokeless tobacco use about 9 years ago  He reports that he drinks alcohol  He reports that he does not use drugs  Current Outpatient Prescriptions   Medication Sig Dispense Refill    albuterol (VENTOLIN HFA) 90 mcg/act inhaler Inhale 2 puffs every 4 (four) hours as needed for wheezing or shortness of breath 1 Inhaler 0    celecoxib (CeleBREX) 200 mg capsule Take 200 mg by mouth daily      EPINEPHrine (EPIPEN 2-HOANG) 0 3 mg/0 3 mL SOAJ Inject as directed Twice daily      esomeprazole (NexIUM) 20 mg capsule Take 20 mg by mouth every morning before breakfast      Multiple Vitamins-Minerals (PX MENS MULTIVITAMINS) TABS Take 1 tablet by mouth daily      SUMAtriptan (IMITREX) 50 mg tablet Take 1 tablet by mouth for migraine relief, may repeat every 2 hours max 200mg 9 tablet 3     No current facility-administered medications for this visit  Current Outpatient Prescriptions on File Prior to Visit   Medication Sig    albuterol (VENTOLIN HFA) 90 mcg/act inhaler Inhale 2 puffs every 4 (four) hours as needed for wheezing or shortness of breath    EPINEPHrine (EPIPEN 2-HOANG) 0 3 mg/0 3 mL SOAJ Inject as directed Twice daily    esomeprazole (NexIUM) 20 mg capsule Take 20 mg by mouth every morning before breakfast    Multiple Vitamins-Minerals (PX MENS MULTIVITAMINS) TABS Take 1 tablet by mouth daily    SUMAtriptan (IMITREX) 50 mg tablet Take 1 tablet by mouth for migraine relief, may repeat every 2 hours max 200mg    [DISCONTINUED] meloxicam (MOBIC) 7 5 mg tablet Take 7 5 mg by mouth daily     No current facility-administered medications on file prior to visit  He is allergic to bee venom and contrast  [iodinated diagnostic agents]       Review of Systems   Constitutional: Negative for activity change, appetite change, chills, diaphoresis, fatigue, fever and unexpected weight change     HENT: Negative for congestion, ear pain, postnasal drip, rhinorrhea, sinus pain, sinus pressure, sneezing, sore throat, tinnitus and voice change  Eyes: Negative for pain, redness and visual disturbance  Respiratory: Negative for cough, chest tightness, shortness of breath and wheezing  Cardiovascular: Negative for chest pain, palpitations and leg swelling  Gastrointestinal: Negative for abdominal pain, blood in stool, constipation, diarrhea, nausea and vomiting  Genitourinary: Negative for difficulty urinating, dysuria, frequency, hematuria and urgency  Musculoskeletal: Positive for arthralgias and back pain  Negative for gait problem, joint swelling, myalgias, neck pain and neck stiffness  Skin: Negative for color change, pallor, rash and wound  Neurological: Negative for dizziness, tremors, weakness, light-headedness and headaches  Psychiatric/Behavioral: Negative for dysphoric mood, self-injury, sleep disturbance and suicidal ideas  The patient is not nervous/anxious  Objective:      /84   Ht 5' 10" (1 778 m)   Wt 124 kg (273 lb)   BMI 39 17 kg/m²          Physical Exam   Constitutional: He is oriented to person, place, and time  He appears well-developed and well-nourished  No distress  HENT:   Head: Normocephalic and atraumatic  Right Ear: Hearing, tympanic membrane, external ear and ear canal normal    Left Ear: Hearing, tympanic membrane, external ear and ear canal normal    Mouth/Throat: Uvula is midline, oropharynx is clear and moist and mucous membranes are normal  No oropharyngeal exudate  Eyes: Conjunctivae are normal  Right eye exhibits no discharge  Left eye exhibits no discharge  No scleral icterus  Neck: Neck supple  Carotid bruit is not present  No thyromegaly present  Cardiovascular: Normal rate, regular rhythm and normal heart sounds  No murmur heard  Pulmonary/Chest: Effort normal and breath sounds normal  No respiratory distress  He has no wheezes     Abdominal: Soft  Bowel sounds are normal  He exhibits no distension and no mass  There is no tenderness  There is no rebound and no guarding  Musculoskeletal: Normal range of motion  He exhibits tenderness  He exhibits no edema  Lumbar back: He exhibits tenderness  Back:    Lymphadenopathy:     He has no cervical adenopathy  Neurological: He is alert and oriented to person, place, and time  Skin: Skin is warm and dry  No rash noted  He is not diaphoretic  No erythema  Psychiatric: He has a normal mood and affect  His behavior is normal  Judgment and thought content normal    Vitals reviewed

## 2019-02-08 NOTE — PROGRESS NOTES
Daily Note     Today's date: 2019  Patient name: Cailin Ivory  : 1965  MRN: [id number]  Referring provider: Clement Lopez DO  Dx:   Encounter Diagnosis     ICD-10-CM    1  Presence of right artificial knee joint Z96 651    2  Muscle strain of gluteal region, right, subsequent encounter S76 729D                   Subjective: pt reports seeing MD and is pleased with knee  States very pleased with knee flx rom  Pt states MD not concerned with recent flare up  Objective: See treatment diary below      Assessment: Tolerated treatment fair  Patient demonstrated fatigue post treatment and exhibited good technique with therapeutic exercises  Pt able to complete program with mild pain  Pt with notable restriction with knee ext rom and will cont to benefit from tx  Plan: Continue per plan of care        Precautions: R partial knee replacement 1/15/19     Daily Treatment Diary     HEP: Sheet provided and discussed     Manual  19                   ART x20'                     IASTM                       JM                       PROM and LE stretch    15'                   STM                          Exercise Diary  19                   TM x8'  10'                   Bike x8'                     NuStep                       Seated Towel Calf Stretch 6x20''  20"                   Standing 1/2 Roll calf stretch 6x20''  20"                   SL Ecc HR                       HR/TR 3x10  3/10                   TB TKE 3x10 L5  L5 3/10                   TB Heel to Toes 3x5 L2  L4 3/10                   Forward/backward heel to toe walk 3x10  3/10                   Sit to stand 3x10  3/10                   No shoe AE Steamboats                       Bridge with Add squeeze 3x10  3/10                   SL Bridge                       Clam shells                       SL PB Pull Ins 3x10  3/10                   BOSU SLB                       Upside down BOSU SL squat holds                       TB Lat Walks 2x60' P-Bars L2  L2 2/60'                   Step ups/downs 3x10 6''  3/10 8"                   Prone R LE hangs 5x1'  5/1'                   Prone hip extensions 3x10  3/10                   SL Hip Abd 3x10                     Prone press ups 2x10                     Ankle Pumps 3x10                     QS Back and forth 3x10                     HS into QS 3x10 3/10                         Modalities  2/4/19 2/8/19                   MHP                       CP Prone hang position x12'  10'                   Stim

## 2019-02-11 ENCOUNTER — APPOINTMENT (OUTPATIENT)
Dept: RADIOLOGY | Facility: MEDICAL CENTER | Age: 54
End: 2019-02-11
Payer: COMMERCIAL

## 2019-02-11 ENCOUNTER — OFFICE VISIT (OUTPATIENT)
Dept: PHYSICAL THERAPY | Facility: CLINIC | Age: 54
End: 2019-02-11
Payer: COMMERCIAL

## 2019-02-11 DIAGNOSIS — S76.011D MUSCLE STRAIN OF GLUTEAL REGION, RIGHT, SUBSEQUENT ENCOUNTER: ICD-10-CM

## 2019-02-11 DIAGNOSIS — M54.31 RIGHT SIDED SCIATICA: ICD-10-CM

## 2019-02-11 DIAGNOSIS — Z96.651 PRESENCE OF RIGHT ARTIFICIAL KNEE JOINT: Primary | ICD-10-CM

## 2019-02-11 PROCEDURE — 97110 THERAPEUTIC EXERCISES: CPT | Performed by: PHYSICAL THERAPIST

## 2019-02-11 PROCEDURE — 97530 THERAPEUTIC ACTIVITIES: CPT | Performed by: PHYSICAL THERAPIST

## 2019-02-11 PROCEDURE — 97140 MANUAL THERAPY 1/> REGIONS: CPT | Performed by: PHYSICAL THERAPIST

## 2019-02-11 PROCEDURE — 72110 X-RAY EXAM L-2 SPINE 4/>VWS: CPT

## 2019-02-11 PROCEDURE — 97112 NEUROMUSCULAR REEDUCATION: CPT | Performed by: PHYSICAL THERAPIST

## 2019-02-11 NOTE — PROGRESS NOTES
Daily Note     Today's date: 2019  Patient name: Linsey Burns  : 1965  MRN: [id number]  Referring provider: Sofi Centeno DO  Dx:   Encounter Diagnosis     ICD-10-CM    1  Presence of right artificial knee joint Z96 651    2  Muscle strain of gluteal region, right, subsequent encounter S76 011D                   Subjective: Pt reports he is doing well  Continues to see progress  Anxious to continue with physical therapy  Objective: See treatment diary below      Assessment: Tolerated treatment well  Patient demonstrated fatigue post treatment and exhibited good technique with therapeutic exercises  Pt able to complete program with mild pain  Plan: Continue per plan of care  Progress treatment as tolerated         Precautions: R partial knee replacement 1/15/19     Daily Treatment Diary     HEP: Sheet provided and discussed     Manual  19                   ART x20'  x15'                   IASTM                       JM                       PROM and LE stretch                      STM                          Exercise Diary  19                   TM x8'  10'                   Bike x8'                     NuStep                       Seated Towel Calf Stretch 6x20''                     Standing 1/2 Roll calf stretch 6x20''  6/20"                   SL Ecc HR                       HR/TR 3x10  3/10                   TB TKE 3x10 L5  L5 3/10                   TB Heel to Toes 3x5 L2  L4 3/10                   Forward/backward heel to toe walk 3x10  3/10                   Sit to stand 3x10  3/10                   No shoe AE Steamboats                       Bridge with Add squeeze 3x10  3/10                   SL Bridge                       Clam shells                       SL PB Pull Ins 3x10  3/10                   BOSU SLB                       Upside down BOSU SL squat holds                       TB Lat Walks 2x60' P-Bars L2  L2 2/60'                   Step ups/downs 3x10 6''  3/10 8"                   Prone R LE hangs 5x1'  5/1'                   Prone hip extensions 3x10  3/10                   SL Hip Abd 3x10                     Prone press ups 2x10                     Ankle Pumps 3x10                     QS Back and forth 3x10                     HS into QS into SLR 3x10 3/10                   SL PB Pull ins  3x10                                                                     Modalities  2/4/19 2/8/19                   MHP                       CP Prone hang position x12'  12'                   Stim

## 2019-02-14 ENCOUNTER — TELEPHONE (OUTPATIENT)
Dept: FAMILY MEDICINE CLINIC | Facility: CLINIC | Age: 54
End: 2019-02-14

## 2019-02-14 DIAGNOSIS — M54.31 RIGHT SIDED SCIATICA: Primary | ICD-10-CM

## 2019-05-01 NOTE — PROGRESS NOTES
Discharge Summary    Unless informed otherwise, patient will be D/C from physical therapy, as he has not reached out to schedule an appointment since 2/11/19

## 2019-05-15 DIAGNOSIS — J40 BRONCHITIS: ICD-10-CM

## 2019-05-16 ENCOUNTER — OFFICE VISIT (OUTPATIENT)
Dept: FAMILY MEDICINE CLINIC | Facility: CLINIC | Age: 54
End: 2019-05-16
Payer: COMMERCIAL

## 2019-05-16 VITALS
SYSTOLIC BLOOD PRESSURE: 128 MMHG | WEIGHT: 272.4 LBS | DIASTOLIC BLOOD PRESSURE: 90 MMHG | TEMPERATURE: 96.6 F | BODY MASS INDEX: 39 KG/M2 | HEIGHT: 70 IN

## 2019-05-16 DIAGNOSIS — J01.10 ACUTE NON-RECURRENT FRONTAL SINUSITIS: Primary | ICD-10-CM

## 2019-05-16 DIAGNOSIS — J30.89 ENVIRONMENTAL AND SEASONAL ALLERGIES: ICD-10-CM

## 2019-05-16 PROCEDURE — 99213 OFFICE O/P EST LOW 20 MIN: CPT | Performed by: PHYSICIAN ASSISTANT

## 2019-05-16 PROCEDURE — 1036F TOBACCO NON-USER: CPT | Performed by: PHYSICIAN ASSISTANT

## 2019-05-16 PROCEDURE — 3008F BODY MASS INDEX DOCD: CPT | Performed by: PHYSICIAN ASSISTANT

## 2019-05-16 RX ORDER — MONTELUKAST SODIUM 10 MG/1
10 TABLET ORAL
Qty: 30 TABLET | Refills: 2 | Status: SHIPPED | OUTPATIENT
Start: 2019-05-16 | End: 2021-06-03

## 2019-05-16 RX ORDER — AMOXICILLIN AND CLAVULANATE POTASSIUM 875; 125 MG/1; MG/1
1 TABLET, FILM COATED ORAL EVERY 12 HOURS SCHEDULED
Qty: 14 TABLET | Refills: 0 | Status: SHIPPED | OUTPATIENT
Start: 2019-05-16 | End: 2019-05-23

## 2020-07-08 DIAGNOSIS — Z91.030 ALLERGY TO BEE STING: Primary | ICD-10-CM

## 2020-07-08 RX ORDER — EPINEPHRINE 0.3 MG/.3ML
0.3 INJECTION SUBCUTANEOUS ONCE
Qty: 2 EACH | Refills: 3 | Status: SHIPPED | OUTPATIENT
Start: 2020-07-08 | End: 2021-07-08 | Stop reason: SDUPTHER

## 2020-11-04 ENCOUNTER — TELEPHONE (OUTPATIENT)
Dept: FAMILY MEDICINE CLINIC | Facility: CLINIC | Age: 55
End: 2020-11-04

## 2020-11-04 DIAGNOSIS — L23.7 POISON IVY: Primary | ICD-10-CM

## 2020-11-04 RX ORDER — PREDNISONE 20 MG/1
TABLET ORAL
Qty: 12 TABLET | Refills: 0 | Status: SHIPPED | OUTPATIENT
Start: 2020-11-04

## 2020-12-09 ENCOUNTER — TELEPHONE (OUTPATIENT)
Dept: FAMILY MEDICINE CLINIC | Facility: CLINIC | Age: 55
End: 2020-12-09

## 2020-12-09 DIAGNOSIS — L03.039 CELLULITIS OF TOE, UNSPECIFIED LATERALITY: Primary | ICD-10-CM

## 2020-12-09 RX ORDER — CEPHALEXIN 500 MG/1
500 CAPSULE ORAL EVERY 6 HOURS SCHEDULED
Qty: 40 CAPSULE | Refills: 0 | Status: SHIPPED | OUTPATIENT
Start: 2020-12-09 | End: 2020-12-19

## 2021-06-03 ENCOUNTER — OFFICE VISIT (OUTPATIENT)
Dept: URGENT CARE | Facility: CLINIC | Age: 56
End: 2021-06-03
Payer: COMMERCIAL

## 2021-06-03 ENCOUNTER — APPOINTMENT (OUTPATIENT)
Dept: RADIOLOGY | Facility: CLINIC | Age: 56
End: 2021-06-03
Payer: COMMERCIAL

## 2021-06-03 VITALS
DIASTOLIC BLOOD PRESSURE: 75 MMHG | TEMPERATURE: 98.7 F | SYSTOLIC BLOOD PRESSURE: 118 MMHG | WEIGHT: 280 LBS | HEART RATE: 100 BPM | RESPIRATION RATE: 18 BRPM | OXYGEN SATURATION: 95 % | BODY MASS INDEX: 40.09 KG/M2 | HEIGHT: 70 IN

## 2021-06-03 DIAGNOSIS — M25.532 PAIN IN LEFT WRIST: ICD-10-CM

## 2021-06-03 DIAGNOSIS — T14.8XXA ABRASION OF SKIN: ICD-10-CM

## 2021-06-03 DIAGNOSIS — S63.502A LEFT WRIST SPRAIN, INITIAL ENCOUNTER: Primary | ICD-10-CM

## 2021-06-03 PROCEDURE — 73110 X-RAY EXAM OF WRIST: CPT

## 2021-06-03 PROCEDURE — 99213 OFFICE O/P EST LOW 20 MIN: CPT | Performed by: PHYSICIAN ASSISTANT

## 2021-06-03 PROCEDURE — 29125 APPL SHORT ARM SPLINT STATIC: CPT | Performed by: PHYSICIAN ASSISTANT

## 2021-06-03 RX ORDER — FEXOFENADINE HCL 180 MG/1
180 TABLET ORAL
COMMUNITY

## 2021-06-03 NOTE — PROGRESS NOTES
[facility]        NAME: Rody Godoy  is a 54 y o  male  : 1965    MRN: [id number]  DATE: Marielos 3, 2021  TIME: 5:45 PM    Assessment and Plan   Left wrist sprain, initial encounter [S6 502A]  1  Left wrist sprain, initial encounter  XR wrist 3+ vw left    Orthopedic injury treatment   2  Abrasion of skin         Per chart review, patient has not had Tdap since   Offered Tdap but patient declined because he does not want his arm to be sore because he is going to a wedding  Discussed with patient it is easier to prevent tetanus in the treat tetanus  Patient declined  Wound cleansed and covered with dressing  Pre made static thumb spica applied to left wrist        Discussed with patient potential scaphoid fracture or disruption of prior surgical plate  Due to prior surgery and Pain is snuffbox, patient advised to wear thumb spica at all times and to call surgeon for follow-up  Patient Instructions   Rest, ice, compression, elevation  OTC tylenol and ibuprofen as needed for pain  Call ortho surgeon    Follow up with PCP in 3-5 days  Proceed to  ER if symptoms worsen  Chief Complaint     Chief Complaint   Patient presents with    Wrist Pain     Left wrist pain happed about a hour ago Landed on it and has a old injury to this wrist          History of Present Illness       Patient is a 77-year-old male with significant past medical history of lunate scaphoid ligament tear presents the office complaining of left wrist pain status post trip and fall today  Pain is located to the "inside"  Of the wrist described as 2/10  Patient reports he feels like his wrist is much more mobile than it was prior to fall  He is concern for damage to his prior surgery or fracture  He has associated swelling but no ecchymosis  Denies numbness and tingling  Patient is right-hand dominant        Review of Systems   Review of Systems   Musculoskeletal: Positive for arthralgias and joint swelling  Skin: Positive for wound           Current Medications       Current Outpatient Medications:     EPINEPHrine (EpiPen 2-Compa) 0 3 mg/0 3 mL SOAJ, Inject 0 3 mL (0 3 mg total) into a muscle once for 1 dose, Disp: 2 each, Rfl: 3    fexofenadine (ALLEGRA) 180 MG tablet, Take 180 mg by mouth, Disp: , Rfl:     Multiple Vitamins-Minerals (PX MENS MULTIVITAMINS) TABS, Take 1 tablet by mouth daily, Disp: , Rfl:     predniSONE 20 mg tablet, 1 t i d  day 1 and 2 then 1 b i d  day 3 in 4 then 1 daily until finished, Disp: 12 tablet, Rfl: 0    PROAIR  (90 Base) MCG/ACT inhaler, INHALE 2 PUFFS BY MOUTH EVERY 4 HOURS AS NEEDED FOR WHEEZING OR SHORTNESS OF BREATH, Disp: 8 5 g, Rfl: 0    Current Allergies     Allergies as of 06/03/2021 - Reviewed 05/16/2019   Allergen Reaction Noted    Bee venom Anaphylaxis 09/09/2014    Contrast  [iodinated diagnostic agents] Rash 10/02/2012            The following portions of the patient's history were reviewed and updated as appropriate: allergies, current medications, past family history, past medical history, past social history, past surgical history and problem list      Past Medical History:   Diagnosis Date    Bee allergy status     Last Assessed 8/25/2016    Benign lipomatous neoplasm     Last Assessed 7/7/2017    Cellulitis of mid back region     Last Assessed 11/13/2016    High risk medication use     Last Assessed 12/21/2015    Internal derangement of knee, left     Last Assessed 2/28/2017    Pelvic cramping     Last Assessed 8/25/2016    Prostatism     Last Assessed 8/25/2016    Sebaceous cyst     Last Assessed 7/01/2017       Past Surgical History:   Procedure Laterality Date    ARTHROSCOPY ELBOW Left 2003    ARTHROSCOPY KNEE Right     HERNIA REPAIR      x 2    KNEE SURGERY Left     SHOULDER ARTHROSCOPY Left 2003    x2    SHOULDER ARTHROSCOPY Right 04/2012    x2    TONSILLECTOMY      WRIST SURGERY Left     Wrist hardware - Titanium plate Family History   Problem Relation Age of Onset    Asthma Mother     Diabetes type II Maternal Grandfather          Medications have been verified  Objective   /75   Pulse 100   Temp 98 7 °F (37 1 °C)   Resp 18   Ht 5' 10" (1 778 m)   Wt 127 kg (280 lb)   SpO2 95%   BMI 40 18 kg/m²   No LMP for male patient  Physical Exam     Physical Exam  Vitals signs and nursing note reviewed  Constitutional:       Appearance: He is well-developed  HENT:      Head: Normocephalic and atraumatic  Right Ear: External ear normal       Left Ear: External ear normal       Nose: Nose normal    Eyes:      General: Lids are normal       Conjunctiva/sclera: Conjunctivae normal    Musculoskeletal:      Comments: Right wrist:  Swelling to lateral aspect  No ecchymosis or obvious deformity  Tenderness to patient to snuffbox  Full active range of motion  5/5 strength  Neurovascular intact  Skin:     General: Skin is warm and dry  Capillary Refill: Capillary refill takes less than 2 seconds  Neurological:      Mental Status: He is alert  Right wrist x-ray:  No evidence of acute osseous abnormalities  Presence of prior surgical plates  Radiology interpretation pending  Orthopedic injury treatment    Date/Time: 6/3/2021 5:43 PM  Performed by: Lio Garcia PA-C  Authorized by: Lio Garcia PA-C     Patient Location:  Bedside  Other Assisting Provider: No    Verbal consent obtained?: Yes    Risks and benefits: Risks, benefits and alternatives were discussed    Consent given by:  Patient  Patient states understanding of procedure being performed: Yes    Patient's understanding of procedure matches consent: Yes    Time out: Immediately prior to the procedure a time out was called    Injury location:  Wrist  Location details:  Left wrist  Injury type:   Soft tissue  Neurovascular status: Neurovascularly intact    Distal perfusion: normal    Neurological function: normal Range of motion: normal    Immobilization:  Brace (Pre made static thumb spica)  Neurovascular status: Neurovascularly intact    Distal perfusion: normal    Neurological function: normal    Range of motion: unchanged    Patient tolerance:  Patient tolerated the procedure well with no immediate complications

## 2021-06-03 NOTE — PATIENT INSTRUCTIONS
Rest, ice, compression, elevation  OTC tylenol and ibuprofen as needed for pain  Call ortho surgeon  Go to ER if symptoms become severe  Wrist Sprain   WHAT YOU NEED TO KNOW:   A wrist sprain happens when one or more ligaments in your wrist stretch or tear  Ligaments are tough tissues that connect bones and keep them in place, and support your joints  DISCHARGE INSTRUCTIONS:   Return to the emergency department if:   · You have severe pain or swelling  · Your injured wrist is red or has red streaks spreading from the injured area  · You have new trouble moving your hands, fingers, or wrist     · Your wrist, hand, or fingers feel cold or numb  · Your fingernails turn blue or gray  Call your doctor if:   · Your symptoms get worse  · You have pain and swelling for more than 48 hours  · You have questions or concerns about your condition or care  Medicines: You may need any of the following:  · NSAIDs , such as ibuprofen, help decrease swelling, pain, and fever  NSAIDs can cause stomach bleeding or kidney problems in certain people  If you take blood thinner medicine, always ask your healthcare provider if NSAIDs are safe for you  Always read the medicine label and follow directions  · Acetaminophen  decreases pain and fever  It is available without a doctor's order  Ask how much to take and how often to take it  Follow directions  Read the labels of all other medicines you are using to see if they also contain acetaminophen, or ask your doctor or pharmacist  Acetaminophen can cause liver damage if not taken correctly  Do not use more than 4 grams (4,000 milligrams) total of acetaminophen in one day  · Take your medicine as directed  Contact your healthcare provider if you think your medicine is not helping or if you have side effects  Tell him or her if you are allergic to any medicine  Keep a list of the medicines, vitamins, and herbs you take   Include the amounts, and when and why you take them  Bring the list or the pill bottles to follow-up visits  Carry your medicine list with you in case of an emergency  Self-care:   · Rest  your wrist for at least 48 hours  Avoid activities that cause pain  · Ice  your wrist for 15 to 20 minutes every hour or as directed  Use an ice pack, or put crushed ice in a plastic bag  Cover it with a towel before you put it on your wrist  Ice helps prevent tissue damage and decreases swelling and pain  · Compress  your wrist with an elastic bandage  This will help decrease swelling, support your wrist, and help it heal  Wear your wrist wrap as directed  The elastic bandage should be snug but not tight  · Elevate  your wrist above the level of your heart as often as you can  This will help decrease swelling and pain  Prop your wrist on pillows or blankets to keep it elevated comfortably  Wrist support: You may need to wear a splint or cast to support your wrist and prevent more damage  Wear your splint as directed  Ask for instructions on how to bathe while you are wearing a splint or cast   Physical therapy:  Your healthcare provider may recommend that you go to physical therapy  A physical therapist teaches you exercises to help improve movement and strength, and to decrease pain  Follow up with your doctor as directed:  Write down your questions so you remember to ask them during your visits  © Copyright [address] Information is for End User's use only and may not be sold, redistributed or otherwise used for commercial purposes  All illustrations and images included in CareNotes® are the copyrighted property of A D A M , Inc  or [facility] [employer]   The above information is an  only  It is not intended as medical advice for individual conditions or treatments  Talk to your doctor, nurse or pharmacist before following any medical regimen to see if it is safe and effective for you

## 2021-06-08 ENCOUNTER — TELEPHONE (OUTPATIENT)
Dept: URGENT CARE | Facility: CLINIC | Age: 56
End: 2021-06-08

## 2021-06-08 NOTE — TELEPHONE ENCOUNTER
Spoke with patient regarding radiology review of left wrist x-ray    Per Radiology:     "FINDINGS:     There is no acute fracture or dislocation      Widening of the scapholunate interval is noted   Moderate radiocarpal joint osteoarthritis is present   [place] is seen in association with the scaphoid      No lytic or blastic osseous lesion      Soft tissues are unremarkable      IMPRESSION:     No acute osseous abnormality      Degenerative changes as described "        Patient advised to follow-up with surgeon who performed procedure

## 2021-06-08 NOTE — TELEPHONE ENCOUNTER
Attempted to contact patient regarding radiology review of left wrist x-ray  Per Radiology:    "FINDINGS:     There is no acute fracture or dislocation      Widening of the scapholunate interval is noted  Moderate radiocarpal joint osteoarthritis is present    [place] is seen in association with the scaphoid      No lytic or blastic osseous lesion      Soft tissues are unremarkable      IMPRESSION:     No acute osseous abnormality      Degenerative changes as described "      Patient advised to follow-up with surgeon who performed procedure

## 2021-07-08 DIAGNOSIS — Z91.030 ALLERGY TO BEE STING: ICD-10-CM

## 2021-07-08 RX ORDER — EPINEPHRINE 0.3 MG/.3ML
0.3 INJECTION SUBCUTANEOUS ONCE
Qty: 2 EACH | Refills: 3 | Status: SHIPPED | OUTPATIENT
Start: 2021-07-08 | End: 2021-07-08

## 2022-01-22 ENCOUNTER — APPOINTMENT (EMERGENCY)
Dept: RADIOLOGY | Facility: HOSPITAL | Age: 57
End: 2022-01-22
Payer: COMMERCIAL

## 2022-01-22 ENCOUNTER — HOSPITAL ENCOUNTER (EMERGENCY)
Facility: HOSPITAL | Age: 57
Discharge: HOME | End: 2022-01-22
Attending: STUDENT IN AN ORGANIZED HEALTH CARE EDUCATION/TRAINING PROGRAM | Admitting: STUDENT IN AN ORGANIZED HEALTH CARE EDUCATION/TRAINING PROGRAM
Payer: COMMERCIAL

## 2022-01-22 VITALS
BODY MASS INDEX: 40.52 KG/M2 | TEMPERATURE: 98.6 F | SYSTOLIC BLOOD PRESSURE: 130 MMHG | WEIGHT: 283 LBS | DIASTOLIC BLOOD PRESSURE: 104 MMHG | RESPIRATION RATE: 18 BRPM | OXYGEN SATURATION: 97 % | HEART RATE: 88 BPM | HEIGHT: 70 IN

## 2022-01-22 DIAGNOSIS — S60.418A: ICD-10-CM

## 2022-01-22 DIAGNOSIS — S60.222A CONTUSION OF LEFT HAND, INITIAL ENCOUNTER: ICD-10-CM

## 2022-01-22 DIAGNOSIS — S61.227A LACERATION OF LEFT LITTLE FINGER WITH FOREIGN BODY WITHOUT DAMAGE TO NAIL, INITIAL ENCOUNTER: Primary | ICD-10-CM

## 2022-01-22 PROCEDURE — 12001 RPR S/N/AX/GEN/TRNK 2.5CM/<: CPT | Performed by: STUDENT IN AN ORGANIZED HEALTH CARE EDUCATION/TRAINING PROGRAM

## 2022-01-22 PROCEDURE — 90715 TDAP VACCINE 7 YRS/> IM: CPT | Performed by: STUDENT IN AN ORGANIZED HEALTH CARE EDUCATION/TRAINING PROGRAM

## 2022-01-22 PROCEDURE — 99283 EMERGENCY DEPT VISIT LOW MDM: CPT

## 2022-01-22 PROCEDURE — 73130 X-RAY EXAM OF HAND: CPT

## 2022-01-22 PROCEDURE — 99284 EMERGENCY DEPT VISIT MOD MDM: CPT | Performed by: STUDENT IN AN ORGANIZED HEALTH CARE EDUCATION/TRAINING PROGRAM

## 2022-01-22 RX ORDER — BACITRACIN, NEOMYCIN, POLYMYXIN B 400; 3.5; 5 [USP'U]/G; MG/G; [USP'U]/G
1 OINTMENT TOPICAL ONCE
Status: COMPLETED | OUTPATIENT
Start: 2022-01-22 | End: 2022-01-22

## 2022-01-22 RX ADMIN — BACITRACIN ZINC NEOMYCIN SULFATE POLYMYXIN B SULFATE 1 LARGE APPLICATION: 400; 3.5; 5 OINTMENT TOPICAL at 20:20

## 2022-01-22 RX ADMIN — TETANUS TOXOID, REDUCED DIPHTHERIA TOXOID AND ACELLULAR PERTUSSIS VACCINE, ADSORBED 0.5 ML: 5; 2.5; 8; 8; 2.5 SUSPENSION INTRAMUSCULAR at 19:50

## 2022-01-23 NOTE — DISCHARGE INSTRUCTIONS
You were evaluated in the ED after a hand injury  The xrays that were obtained did not show any broken bones or foreign bodies  The laceration along your left pinky finger was repaired with 6 sutures  Follow-up with Hand surgery in 10-14 days for re-evaluation and suture removal   Keep the sutures covered, dry for the 1st 24 hours  After that, you can gently rinse your hand and laceration site with soap and water  Apply bacitracin or any other topical antibiotic 2-3 times daily  Motrin/Tylenol recommended for pain  For the 1st 24-48 hours, you can apply ice--20 minutes on, 20 minutes off  Do not hesitate to return to the emergency department for any concerning signs or symptoms

## 2022-01-23 NOTE — ED PROVIDER NOTES
History  Chief Complaint   Patient presents with    Hand Injury     pt missed a step, tripped, and fell onto his left hand  swelling noted to top of left hand  lacerations noted to left 5th finger and 4th finger knuckle        History provided by:  Patient  Hand Injury  Location:  Hand  Hand location:  L palm and dorsum of L hand  Injury: yes    Time since incident:  30 minutes  Mechanism of injury: fall    Fall:     Fall occurred:  Down stairs    Impact surface:  Gravel and concrete    Point of impact:  Hands  Pain details:     Quality:  Throbbing    Radiates to:  Does not radiate    Severity:  Moderate    Onset quality:  Sudden    Timing:  Constant    Progression:  Unchanged  Foreign body present:  No foreign bodies  Tetanus status:  Out of date  Prior injury to area:  No  Relieved by:  None tried  Worsened by: Movement  Ineffective treatments:  None tried  Associated symptoms: no muscle weakness and no numbness       59-year-old male  Presents to the emergency department after a hand injury  He states that he was walking downstairs when he missed a step  He subsequently fell onto his left hand  He sustained a laceration to the volar aspect of the left 5th digit  He denies head strike, loss of consciousness, anticoagulation/anti-platelet use  Denies all other injuries  Has not taken anything for pain prior to arrival   The fall occurred approximately 30 minutes prior to arrival in the ED  Prior to Admission Medications   Prescriptions Last Dose Informant Patient Reported? Taking?    EPINEPHrine (EpiPen 2-Compa) 0 3 mg/0 3 mL SOAJ   No No   Sig: Inject 0 3 mL (0 3 mg total) into a muscle once for 1 dose   Multiple Vitamins-Minerals (PX MENS MULTIVITAMINS) TABS   Yes No   Sig: Take 1 tablet by mouth daily   PROAIR  (90 Base) MCG/ACT inhaler   No No   Sig: INHALE 2 PUFFS BY MOUTH EVERY 4 HOURS AS NEEDED FOR WHEEZING OR SHORTNESS OF BREATH   Patient not taking: Reported on 11/9/2021   dexlansoprazole (Dexilant) 60 MG capsule   Yes No   Sig: Take 60 mg by mouth daily   Patient not taking: Reported on 2021    famotidine (PEPCID) 40 MG tablet   Yes No   Sig: Take 40 mg by mouth daily   Patient not taking: Reported on 2021    fexofenadine (ALLEGRA) 180 MG tablet   Yes No   Sig: Take 180 mg by mouth   predniSONE 20 mg tablet   No No   Si t i d  day 1 and 2 then 1 b i d  day 3 in 4 then 1 daily until finished   Patient not taking: Reported on 2021       Facility-Administered Medications: None       Past Medical History:   Diagnosis Date    Bee allergy status     Last Assessed 2016    Benign lipomatous neoplasm     Last Assessed 2017    Cellulitis of mid back region     Last Assessed 2016    High risk medication use     Last Assessed 2015    Internal derangement of knee, left     Last Assessed 2017    Pelvic cramping     Last Assessed 2016    Prostatism     Last Assessed 2016    Sebaceous cyst     Last Assessed 2017       Past Surgical History:   Procedure Laterality Date    ARTHROSCOPY ELBOW Left 2003    ARTHROSCOPY KNEE Right     COLONOSCOPY  2013    normal    EGD  2009    regular Z line and biopsy negative for intestinal metaplasia by Dr Long Meth EGD  2012    intact fundoplication no abnormalities by Dr Orlin Lindsey biopsy esophagus showed normal mucosa    HERNIA REPAIR      x 2    KNEE SURGERY Left     SHOULDER ARTHROSCOPY Left 2003    x2    SHOULDER ARTHROSCOPY Right 04/2012    x2    TONSILLECTOMY      WRIST SURGERY Left     Wrist hardware - Titanium plate       Family History   Problem Relation Age of Onset    Asthma Mother     Diabetes type II Maternal Grandfather      I have reviewed and agree with the history as documented  E-Cigarette/Vaping     E-Cigarette/Vaping Substances     Social History     Tobacco Use    Smoking status: Never Smoker    Smokeless tobacco: Former User   Substance Use Topics    Alcohol use:  Yes Comment: Occasionally beer    Drug use: No     Review of Systems   Musculoskeletal: Positive for arthralgias and joint swelling  Skin: Positive for color change and wound  Hematological: Does not bruise/bleed easily  All other systems reviewed and are negative  Physical Exam  Physical Exam  Vitals and nursing note reviewed  Constitutional:       General: He is not in acute distress  Appearance: He is not ill-appearing or toxic-appearing  HENT:      Head: Normocephalic and atraumatic  Right Ear: External ear normal       Left Ear: External ear normal    Eyes:      General:         Right eye: No discharge  Left eye: No discharge  Extraocular Movements: Extraocular movements intact  Conjunctiva/sclera: Conjunctivae normal    Cardiovascular:      Rate and Rhythm: Normal rate and regular rhythm  Pulses: Normal pulses  Heart sounds: No murmur heard  Pulmonary:      Effort: Pulmonary effort is normal  No respiratory distress  Breath sounds: Normal breath sounds  No stridor  No wheezing, rhonchi or rales  Chest:      Chest wall: No tenderness  Musculoskeletal:         General: Swelling, tenderness and signs of injury present  Hands:       Cervical back: Normal range of motion and neck supple  No tenderness  Comments: Hematoma along the 4th/5th MCP joint  2 5 cm linear laceration along the volar aspect of the distal 5th digit   The left hand is NVI   Skin:     General: Skin is warm and dry  Capillary Refill: Capillary refill takes less than 2 seconds  Coloration: Skin is not jaundiced or pale  Findings: No bruising, erythema, lesion or rash  Neurological:      General: No focal deficit present  Mental Status: He is alert and oriented to person, place, and time  Mental status is at baseline  Cranial Nerves: No cranial nerve deficit  Sensory: No sensory deficit  Motor: No weakness     Psychiatric:         Mood and Affect: Mood normal          Behavior: Behavior normal          Thought Content: Thought content normal          Judgment: Judgment normal        Vital Signs  ED Triage Vitals [01/22/22 1908]   Temperature Pulse Respirations Blood Pressure SpO2   98 6 °F (37 °C) 88 18 (!) 130/104 97 %      Temp Source Heart Rate Source Patient Position - Orthostatic VS BP Location FiO2 (%)   Temporal Monitor Sitting Right arm --      Pain Score       5           Vitals:    01/22/22 1908   BP: (!) 130/104   Pulse: 88   Patient Position - Orthostatic VS: Sitting     ED Medications  Medications   tetanus-diphtheria-acellular pertussis (BOOSTRIX) IM injection 0 5 mL (0 5 mL Intramuscular Given 1/22/22 1950)   neomycin-bacitracin-polymyxin b (NEOSPORIN) ointment 1 large application (1 large application Topical Given 1/22/22 2020)     Diagnostic Studies  Results Reviewed     None             XR hand 3+ views LEFT   ED Interpretation by Mary Hope DO (01/22 1938)   No acute osseous abnormalities or foreign bodies noted on hand x-ray             Procedures  Laceration repair    Date/Time: 1/22/2022 7:55 PM  Performed by: Mary Hope DO  Authorized by: Mary Hope DO   Consent: Verbal consent obtained    Consent given by: patient  Patient understanding: patient states understanding of the procedure being performed  Site marked: the operative site was marked  Patient identity confirmed: verbally with patient  Body area: upper extremity  Location details: left small finger  Laceration length: 2 5 cm  Anesthesia: local infiltration    Anesthesia:  Local Anesthetic: lidocaine 1% with epinephrine    Sedation:  Patient sedated: no      Wound Dehiscence:  Superficial Wound Dehiscence: simple closure      Procedure Details:  Irrigation solution: tap water  Irrigation method: syringe  Amount of cleaning: standard  Debridement: none  Degree of undermining: none  Skin closure: Ethilon  Number of sutures: 6  Technique: simple  Approximation: close  Approximation difficulty: simple  Dressing: 4x4 sterile gauze and antibiotic ointment  Patient tolerance: patient tolerated the procedure well with no immediate complications        ED Course     SBIRT 20yo+      Most Recent Value   SBIRT (22 yo +)    In order to provide better care to our patients, we are screening all of our patients for alcohol and drug use  Would it be okay to ask you these screening questions? Yes Filed at: 01/22/2022 9416   Initial Alcohol Screen: US AUDIT-C     1  How often do you have a drink containing alcohol? 2 Filed at: 01/22/2022 1915   2  How many drinks containing alcohol do you have on a typical day you are drinking? 1 Filed at: 01/22/2022 1915   3a  Male UNDER 65: How often do you have five or more drinks on one occasion? 0 Filed at: 01/22/2022 1915   Audit-C Score 3 Filed at: 01/22/2022 5448   CHASE: How many times in the past year have you    Used an illegal drug or used a prescription medication for non-medical reasons? Never Filed at: 01/22/2022 5068        [facility]     60-year-old male  Presents to the emergency department after a fall, left hand injury  The patient denies head strike, loss of consciousness, anticoagulation/anti-platelet use  No other external signs of trauma other than those on the left hand  X-rays of the left hand negative for acute fracture/dislocation  The laceration along the volar aspect of the left 5th digit was repaired with sutures  See procedural note above for further details  A referral to Hand surgery was provided  It was recommended that he follow-up with Hand surgery within the next 10-14 days for re-evaluation and suture removal   Supportive care measures and return precautions were discussed with the patient  He expressed understanding  All questions were addressed  The patient was stable for discharge      Disposition  Final diagnoses:   Laceration of left little finger with foreign body without damage to nail, initial encounter   Abrasion of little finger   Contusion of left hand, initial encounter     Time reflects when diagnosis was documented in both MDM as applicable and the Disposition within this note     Time User Action Codes Description Comment    1/22/2022  8:19 PM Augusto Parker [K50 [id number]] Laceration of left little finger with foreign body without damage to nail, initial encounter     1/22/2022  8:19 PM Augusto Parker [[id number]] Abrasion of little finger     1/22/2022  8:19 PM Augusto Parker [[id number]] Contusion of left hand, initial encounter       ED Disposition     ED Disposition Condition Date/Time Comment    Discharge Stable Sat Jan 22, 2022  8:19 PM Everardo Mares  discharge to home/self care  Follow-up Information     Follow up With Specialties Details Why [facility] Surgery Plastic Surgery In 1 week  [address] [place] [address] [address]  [phone]            Patient's Medications   Discharge Prescriptions    No medications on file       No discharge procedures on file      PDMP Review     None          ED Provider  Electronically Signed by           Sj Damon DO  01/22/22 2052

## 2022-02-02 ENCOUNTER — OFFICE VISIT (OUTPATIENT)
Dept: FAMILY MEDICINE CLINIC | Facility: CLINIC | Age: 57
End: 2022-02-02
Payer: COMMERCIAL

## 2022-02-02 VITALS
DIASTOLIC BLOOD PRESSURE: 102 MMHG | HEIGHT: 70 IN | BODY MASS INDEX: 40.8 KG/M2 | SYSTOLIC BLOOD PRESSURE: 142 MMHG | WEIGHT: 285 LBS | TEMPERATURE: 96.8 F

## 2022-02-02 DIAGNOSIS — Z48.02 VISIT FOR SUTURE REMOVAL: Primary | ICD-10-CM

## 2022-02-02 DIAGNOSIS — E78.2 MIXED HYPERLIPIDEMIA: ICD-10-CM

## 2022-02-02 PROCEDURE — 99213 OFFICE O/P EST LOW 20 MIN: CPT | Performed by: FAMILY MEDICINE

## 2022-02-02 PROCEDURE — 1036F TOBACCO NON-USER: CPT | Performed by: FAMILY MEDICINE

## 2022-02-02 PROCEDURE — 3008F BODY MASS INDEX DOCD: CPT | Performed by: FAMILY MEDICINE

## 2022-02-02 RX ORDER — EPINEPHRINE 0.15 MG/.3ML
0.15 INJECTION INTRAMUSCULAR ONCE
COMMUNITY

## 2022-02-02 NOTE — PROGRESS NOTES
BMI Counseling: Body mass index is 40 89 kg/m²  The BMI is above normal  Nutrition recommendations include decreasing portion sizes, encouraging healthy choices of fruits and vegetables, moderation in carbohydrate intake and increasing intake of lean protein  Exercise recommendations include moderate physical activity 150 minutes/week  Rationale for BMI follow-up plan is due to patient being overweight or obese  Assessment/Plan:sutures removed given a script for lipids and cmp    Problem List Items Addressed This Visit     None      Visit Diagnoses     Visit for suture removal    -  Primary           Diagnoses and all orders for this visit:    Visit for suture removal    Other orders  -     EPINEPHrine (EPIPEN JR) 0 15 mg/0 3 mL SOAJ; Inject 0 15 mg into a muscle once As needed if stung by a bee        No problem-specific Assessment & Plan notes found for this encounter  Subjective:      Patient ID: Dwayne Chowdary  is a 64 y o  male  Suture removal left  5th finger        The following portions of the patient's history were reviewed and updated as appropriate:   He has a past medical history of Bee allergy status, Benign lipomatous neoplasm, Cellulitis of mid back region, High risk medication use, Internal derangement of knee, left, Pelvic cramping, Prostatism, and Sebaceous cyst ,  does not have any pertinent problems on file  ,   has a past surgical history that includes ARTHROSCOPY ELBOW (Left, 2003); ARTHROSCOPY KNEE (Right); Shoulder arthroscopy (Left, 2003); Shoulder arthroscopy (Right, 04/2012); TONSILLECTOMY; Wrist surgery (Left); Knee surgery (Left); Hernia repair; Colonoscopy (01/09/2013); EGD (2009); and EGD (03/2012)  ,  family history includes Asthma in his mother; Diabetes type II in his maternal grandfather  ,   reports that he has never smoked  He quit smokeless tobacco use about 12 years ago  He reports current alcohol use  He reports that he does not use drugs  ,  is allergic to bee venom and contrast  [iodinated diagnostic agents]     Current Outpatient Medications   Medication Sig Dispense Refill    EPINEPHrine (EPIPEN JR) 0 15 mg/0 3 mL SOAJ Inject 0 15 mg into a muscle once As needed if stung by a bee      fexofenadine (ALLEGRA) 180 MG tablet Take 180 mg by mouth      Multiple Vitamins-Minerals (PX MENS MULTIVITAMINS) TABS Take 1 tablet by mouth daily      dexlansoprazole (Dexilant) 60 MG capsule Take 60 mg by mouth daily (Patient not taking: Reported on 11/9/2021 )      EPINEPHrine (EpiPen 2-Compa) 0 3 mg/0 3 mL SOAJ Inject 0 3 mL (0 3 mg total) into a muscle once for 1 dose 2 each 3    famotidine (PEPCID) 40 MG tablet Take 40 mg by mouth daily (Patient not taking: Reported on 11/9/2021 )      predniSONE 20 mg tablet 1 t i d  day 1 and 2 then 1 b i d  day 3 in 4 then 1 daily until finished (Patient not taking: Reported on 11/9/2021 ) 12 tablet 0    PROAIR  (90 Base) MCG/ACT inhaler INHALE 2 PUFFS BY MOUTH EVERY 4 HOURS AS NEEDED FOR WHEEZING OR SHORTNESS OF BREATH (Patient not taking: Reported on 11/9/2021) 8 5 g 0     No current facility-administered medications for this visit  Review of Systems   Constitutional: Negative for activity change, appetite change, diaphoresis, fatigue and fever  HENT: Negative  Eyes: Negative  Respiratory: Negative for apnea, cough, chest tightness, shortness of breath and wheezing  Cardiovascular: Negative for chest pain, palpitations and leg swelling  Gastrointestinal: Negative for abdominal distention, abdominal pain, anal bleeding, constipation, diarrhea, nausea and vomiting  Endocrine: Negative for cold intolerance, heat intolerance, polydipsia, polyphagia and polyuria  Genitourinary: Negative for difficulty urinating, dysuria, flank pain, hematuria and urgency  Musculoskeletal: Negative for arthralgias, back pain, gait problem, joint swelling and myalgias  Skin: Negative for color change, rash and wound  Allergic/Immunologic: Negative for environmental allergies, food allergies and immunocompromised state  Neurological: Negative for dizziness, seizures, syncope, speech difficulty, numbness and headaches  Hematological: Negative for adenopathy  Does not bruise/bleed easily  Psychiatric/Behavioral: Negative for agitation, behavioral problems, hallucinations, sleep disturbance and suicidal ideas  Objective:  Vitals:    02/02/22 0713   BP: (!) 142/102   BP Location: Left arm   Patient Position: Sitting   Cuff Size: Large   Temp: (!) 96 8 °F (36 °C)   TempSrc: Temporal   Weight: 129 kg (285 lb)   Height: 5' 10" (1 778 m)     Body mass index is 40 89 kg/m²  Physical Exam  Constitutional:       General: He is not in acute distress  Appearance: He is well-developed  He is not diaphoretic  HENT:      Head: Normocephalic  Right Ear: External ear normal       Left Ear: External ear normal       Nose: Nose normal    Eyes:      General: No scleral icterus  Right eye: No discharge  Left eye: No discharge  Conjunctiva/sclera: Conjunctivae normal       Pupils: Pupils are equal, round, and reactive to light  Neck:      Thyroid: No thyromegaly  Trachea: No tracheal deviation  Cardiovascular:      Rate and Rhythm: Normal rate and regular rhythm  Heart sounds: Normal heart sounds  No murmur heard  No friction rub  No gallop  Pulmonary:      Effort: Pulmonary effort is normal  No respiratory distress  Breath sounds: Normal breath sounds  No wheezing  Abdominal:      General: Bowel sounds are normal       Palpations: Abdomen is soft  There is no mass  Tenderness: There is no abdominal tenderness  There is no guarding  Musculoskeletal:         General: No deformity  Cervical back: Normal range of motion  Lymphadenopathy:      Cervical: No cervical adenopathy  Skin:     General: Skin is warm and dry  Findings: No erythema or rash  Neurological:      Mental Status: He is alert and oriented to person, place, and time  Cranial Nerves: No cranial nerve deficit  Psychiatric:         Thought Content:  Thought content normal

## 2022-04-19 ENCOUNTER — OFFICE VISIT (OUTPATIENT)
Dept: FAMILY MEDICINE CLINIC | Facility: CLINIC | Age: 57
End: 2022-04-19
Payer: COMMERCIAL

## 2022-04-19 VITALS
OXYGEN SATURATION: 96 % | BODY MASS INDEX: 40.52 KG/M2 | TEMPERATURE: 96.9 F | HEIGHT: 70 IN | SYSTOLIC BLOOD PRESSURE: 118 MMHG | HEART RATE: 83 BPM | WEIGHT: 283 LBS | DIASTOLIC BLOOD PRESSURE: 90 MMHG

## 2022-04-19 DIAGNOSIS — H00.012 HORDEOLUM EXTERNUM OF RIGHT LOWER EYELID: Primary | ICD-10-CM

## 2022-04-19 PROCEDURE — 99214 OFFICE O/P EST MOD 30 MIN: CPT | Performed by: PHYSICIAN ASSISTANT

## 2022-04-19 PROCEDURE — 3008F BODY MASS INDEX DOCD: CPT | Performed by: PHYSICIAN ASSISTANT

## 2022-04-19 PROCEDURE — 1036F TOBACCO NON-USER: CPT | Performed by: PHYSICIAN ASSISTANT

## 2022-04-19 RX ORDER — TOBRAMYCIN 3 MG/ML
2 SOLUTION/ DROPS OPHTHALMIC
Qty: 5 ML | Refills: 0 | Status: SHIPPED | OUTPATIENT
Start: 2022-04-19

## 2022-04-19 NOTE — PROGRESS NOTES
Assessment/Plan:    Problem List Items Addressed This Visit     None      Visit Diagnoses     Hordeolum externum of right lower eyelid    -  Primary    Relevant Medications    tobramycin (TOBREX) 0 3 % SOLN           Diagnoses and all orders for this visit:    Hordeolum externum of right lower eyelid  -     tobramycin (TOBREX) 0 3 % SOLN; Administer 2 drops to the right eye every 4 (four) hours while awake        Recommend applying warm compresses to R eye multiple times per day  Subjective:      Patient ID: Cole Camarillo  is a 64 y o  male  Demetris Segovia is here today complaining of pain in R lower eye lid x 3 days  The following portions of the patient's history were reviewed and updated as appropriate:   He has a past medical history of Bee allergy status, Benign lipomatous neoplasm, Cellulitis of mid back region, High risk medication use, Internal derangement of knee, left, Pelvic cramping, Prostatism, and Sebaceous cyst ,  does not have any pertinent problems on file  ,   has a past surgical history that includes ARTHROSCOPY ELBOW (Left, 2003); ARTHROSCOPY KNEE (Right); Shoulder arthroscopy (Left, 2003); Shoulder arthroscopy (Right, 04/2012); TONSILLECTOMY; Wrist surgery (Left); Knee surgery (Left); Hernia repair; Colonoscopy (01/09/2013); EGD (2009); and EGD (03/2012)  ,  family history includes Asthma in his mother; Diabetes type II in his maternal grandfather  ,   reports that he has never smoked  He quit smokeless tobacco use about 12 years ago  He reports current alcohol use  He reports that he does not use drugs  ,  is allergic to bee venom and contrast  [iodinated diagnostic agents]     Current Outpatient Medications   Medication Sig Dispense Refill    EPINEPHrine (EPIPEN JR) 0 15 mg/0 3 mL SOAJ Inject 0 15 mg into a muscle once As needed if stung by a bee      fexofenadine (ALLEGRA) 180 MG tablet Take 180 mg by mouth      Multiple Vitamins-Minerals (PX MENS MULTIVITAMINS) TABS Take 1 tablet by mouth daily      dexlansoprazole (Dexilant) 60 MG capsule Take 60 mg by mouth daily (Patient not taking: Reported on 11/9/2021 )      EPINEPHrine (EpiPen 2-Compa) 0 3 mg/0 3 mL SOAJ Inject 0 3 mL (0 3 mg total) into a muscle once for 1 dose 2 each 3    famotidine (PEPCID) 40 MG tablet Take 40 mg by mouth daily (Patient not taking: Reported on 11/9/2021 )      predniSONE 20 mg tablet 1 t i d  day 1 and 2 then 1 b i d  day 3 in 4 then 1 daily until finished (Patient not taking: Reported on 11/9/2021 ) 12 tablet 0    PROAIR  (90 Base) MCG/ACT inhaler INHALE 2 PUFFS BY MOUTH EVERY 4 HOURS AS NEEDED FOR WHEEZING OR SHORTNESS OF BREATH (Patient not taking: Reported on 11/9/2021) 8 5 g 0    tobramycin (TOBREX) 0 3 % SOLN Administer 2 drops to the right eye every 4 (four) hours while awake 5 mL 0     No current facility-administered medications for this visit  Review of Systems   Constitutional: Negative for activity change, appetite change, chills, diaphoresis, fatigue, fever and unexpected weight change  HENT: Negative for congestion, ear pain, postnasal drip, rhinorrhea, sinus pressure, sinus pain, sneezing, sore throat, tinnitus and voice change  Eyes: Positive for pain (R lower lid)  Negative for redness and visual disturbance  Respiratory: Negative for cough, chest tightness, shortness of breath and wheezing  Cardiovascular: Negative for chest pain, palpitations and leg swelling  Gastrointestinal: Negative for abdominal pain, blood in stool, constipation, diarrhea, nausea and vomiting  Genitourinary: Negative for difficulty urinating, dysuria, frequency, hematuria and urgency  Musculoskeletal: Negative for arthralgias, back pain, gait problem, joint swelling, myalgias, neck pain and neck stiffness  Skin: Negative for color change, pallor, rash and wound  Neurological: Negative for dizziness, tremors, weakness, light-headedness and headaches     Psychiatric/Behavioral: Negative for dysphoric mood, self-injury, sleep disturbance and suicidal ideas  The patient is not nervous/anxious  Objective:  Vitals:    04/19/22 1026   BP: 118/90   Pulse: 83   Temp: (!) 96 9 °F (36 1 °C)   SpO2: 96%   Weight: 128 kg (283 lb)   Height: 5' 10" (1 778 m)     Body mass index is 40 61 kg/m²  Physical Exam  Vitals reviewed  Constitutional:       General: He is not in acute distress  Appearance: He is well-developed  He is not diaphoretic  HENT:      Head: Normocephalic and atraumatic  Right Ear: Hearing, tympanic membrane, ear canal and external ear normal       Left Ear: Hearing, tympanic membrane, ear canal and external ear normal       Mouth/Throat:      Pharynx: Uvula midline  No oropharyngeal exudate  Eyes:      General: No scleral icterus  Right eye: Hordeolum present  No discharge  Left eye: No discharge  Conjunctiva/sclera: Conjunctivae normal       Right eye: Right conjunctiva is not injected  No chemosis, exudate or hemorrhage  Left eye: Left conjunctiva is not injected  No chemosis, exudate or hemorrhage  Neck:      Thyroid: No thyromegaly  Vascular: No carotid bruit  Cardiovascular:      Rate and Rhythm: Normal rate and regular rhythm  Heart sounds: Normal heart sounds  No murmur heard  Pulmonary:      Effort: Pulmonary effort is normal  No respiratory distress  Breath sounds: Normal breath sounds  No wheezing  Abdominal:      General: Bowel sounds are normal  There is no distension  Palpations: Abdomen is soft  There is no mass  Tenderness: There is no abdominal tenderness  There is no guarding or rebound  Musculoskeletal:         General: No tenderness  Normal range of motion  Cervical back: Neck supple  Lymphadenopathy:      Cervical: No cervical adenopathy  Skin:     General: Skin is warm and dry  Findings: No erythema or rash     Neurological:      Mental Status: He is alert and oriented to person, place, and time  Psychiatric:         Behavior: Behavior normal          Thought Content:  Thought content normal          Judgment: Judgment normal

## 2022-06-17 ENCOUNTER — HOSPITAL ENCOUNTER (EMERGENCY)
Facility: HOSPITAL | Age: 57
Discharge: HOME | End: 2022-06-17
Attending: EMERGENCY MEDICINE | Admitting: EMERGENCY MEDICINE
Payer: COMMERCIAL

## 2022-06-17 VITALS
DIASTOLIC BLOOD PRESSURE: 80 MMHG | RESPIRATION RATE: 18 BRPM | OXYGEN SATURATION: 98 % | SYSTOLIC BLOOD PRESSURE: 164 MMHG | TEMPERATURE: 98.9 F | HEART RATE: 90 BPM

## 2022-06-17 DIAGNOSIS — T78.40XA ALLERGIC REACTION, INITIAL ENCOUNTER: Primary | ICD-10-CM

## 2022-06-17 DIAGNOSIS — T63.444A BEE STING REACTION, UNDETERMINED INTENT, INITIAL ENCOUNTER: ICD-10-CM

## 2022-06-17 PROCEDURE — 96372 THER/PROPH/DIAG INJ SC/IM: CPT

## 2022-06-17 PROCEDURE — 96375 TX/PRO/DX INJ NEW DRUG ADDON: CPT

## 2022-06-17 PROCEDURE — 96374 THER/PROPH/DIAG INJ IV PUSH: CPT

## 2022-06-17 PROCEDURE — 96361 HYDRATE IV INFUSION ADD-ON: CPT

## 2022-06-17 PROCEDURE — 99283 EMERGENCY DEPT VISIT LOW MDM: CPT

## 2022-06-17 PROCEDURE — 99284 EMERGENCY DEPT VISIT MOD MDM: CPT | Performed by: EMERGENCY MEDICINE

## 2022-06-17 RX ORDER — FAMOTIDINE 20 MG/1
20 TABLET, FILM COATED ORAL 2 TIMES DAILY
Qty: 30 TABLET | Refills: 0 | Status: SHIPPED | OUTPATIENT
Start: 2022-06-17

## 2022-06-17 RX ORDER — DIPHENHYDRAMINE HCL 25 MG
25 TABLET ORAL EVERY 6 HOURS
Qty: 20 TABLET | Refills: 0 | Status: SHIPPED | OUTPATIENT
Start: 2022-06-17

## 2022-06-17 RX ORDER — EPINEPHRINE 0.3 MG/.3ML
0.3 INJECTION SUBCUTANEOUS ONCE
Qty: 0.6 ML | Refills: 0 | Status: SHIPPED | OUTPATIENT
Start: 2022-06-17 | End: 2022-06-17

## 2022-06-17 RX ORDER — EPINEPHRINE 1 MG/ML
0.3 INJECTION, SOLUTION, CONCENTRATE INTRAVENOUS ONCE
Status: COMPLETED | OUTPATIENT
Start: 2022-06-17 | End: 2022-06-17

## 2022-06-17 RX ORDER — FAMOTIDINE 10 MG/ML
20 INJECTION, SOLUTION INTRAVENOUS ONCE
Status: COMPLETED | OUTPATIENT
Start: 2022-06-17 | End: 2022-06-17

## 2022-06-17 RX ORDER — DIPHENHYDRAMINE HYDROCHLORIDE 50 MG/ML
25 INJECTION INTRAMUSCULAR; INTRAVENOUS ONCE
Status: COMPLETED | OUTPATIENT
Start: 2022-06-17 | End: 2022-06-17

## 2022-06-17 RX ORDER — METHYLPREDNISOLONE 4 MG/1
TABLET ORAL
Qty: 21 TABLET | Refills: 0 | Status: SHIPPED | OUTPATIENT
Start: 2022-06-17

## 2022-06-17 RX ORDER — METHYLPREDNISOLONE SODIUM SUCCINATE 125 MG/2ML
125 INJECTION, POWDER, LYOPHILIZED, FOR SOLUTION INTRAMUSCULAR; INTRAVENOUS ONCE
Status: COMPLETED | OUTPATIENT
Start: 2022-06-17 | End: 2022-06-17

## 2022-06-17 RX ADMIN — SODIUM CHLORIDE 1000 ML: 0.9 INJECTION, SOLUTION INTRAVENOUS at 19:23

## 2022-06-17 RX ADMIN — FAMOTIDINE 20 MG: 10 INJECTION, SOLUTION INTRAVENOUS at 19:22

## 2022-06-17 RX ADMIN — METHYLPREDNISOLONE SODIUM SUCCINATE 125 MG: 125 INJECTION, POWDER, FOR SOLUTION INTRAMUSCULAR; INTRAVENOUS at 19:21

## 2022-06-17 RX ADMIN — DIPHENHYDRAMINE HYDROCHLORIDE 25 MG: 50 INJECTION, SOLUTION INTRAMUSCULAR; INTRAVENOUS at 19:21

## 2022-06-17 RX ADMIN — EPINEPHRINE 0.3 MG: 1 INJECTION, SOLUTION, CONCENTRATE INTRAVENOUS at 19:21

## 2022-06-17 NOTE — ED PROVIDER NOTES
History  Chief Complaint   Patient presents with    Allergic Reaction     Patient stung by bee in right wrist  Patient stated he has a severe allergic reaction to bees  Patient stated chest feels tight and hes having some sob  Patient has epi pen prescribed but refused to use it prior to coming in  Patient is a 59-year-old male presenting to the emergency department today complaining of bee sting to the right wrist to that occurred just before arrival, patient has a history of allergy to bee stings, states he almost  once from bee sting, he has an EpiPen in his hand in triage which he refused to use because he is afraid giving himself a shot, he did not take any other medications prior to coming to the emergency department, he reports some chest heaviness, feels short of breath, has some swelling and discomfort in his right wrist and hand          Prior to Admission Medications   Prescriptions Last Dose Informant Patient Reported? Taking?    EPINEPHrine (EPIPEN JR) 0 15 mg/0 3 mL SOAJ   Yes No   Sig: Inject 0 15 mg into a muscle once As needed if stung by a bee   EPINEPHrine (EpiPen 2-Compa) 0 3 mg/0 3 mL SOAJ   No No   Sig: Inject 0 3 mL (0 3 mg total) into a muscle once for 1 dose   Multiple Vitamins-Minerals (PX MENS MULTIVITAMINS) TABS   Yes No   Sig: Take 1 tablet by mouth daily   PROAIR  (90 Base) MCG/ACT inhaler   No No   Sig: INHALE 2 PUFFS BY MOUTH EVERY 4 HOURS AS NEEDED FOR WHEEZING OR SHORTNESS OF BREATH   Patient not taking: Reported on 2021   dexlansoprazole (Dexilant) 60 MG capsule   Yes No   Sig: Take 60 mg by mouth daily   Patient not taking: Reported on 2021    famotidine (PEPCID) 40 MG tablet   Yes No   Sig: Take 40 mg by mouth daily   Patient not taking: Reported on 2021    fexofenadine (ALLEGRA) 180 MG tablet   Yes No   Sig: Take 180 mg by mouth   predniSONE 20 mg tablet   No No   Si t i d  day 1 and 2 then 1 b i d  day 3 in 4 then 1 daily until finished Patient not taking: Reported on 11/9/2021    tobramycin (TOBREX) 0 3 % SOLN   No No   Sig: Administer 2 drops to the right eye every 4 (four) hours while awake      Facility-Administered Medications: None       Past Medical History:   Diagnosis Date    Bee allergy status     Last Assessed 8/25/2016    Benign lipomatous neoplasm     Last Assessed 7/7/2017    Cellulitis of mid back region     Last Assessed 11/13/2016    High risk medication use     Last Assessed 12/21/2015    Internal derangement of knee, left     Last Assessed 2/28/2017    Pelvic cramping     Last Assessed 8/25/2016    Prostatism     Last Assessed 8/25/2016    Sebaceous cyst     Last Assessed 7/01/2017       Past Surgical History:   Procedure Laterality Date    ARTHROSCOPY ELBOW Left 2003    ARTHROSCOPY KNEE Right     COLONOSCOPY  01/09/2013    normal    EGD  2009    regular Z line and biopsy negative for intestinal metaplasia by Dr Cantor Look EGD  03/2012    intact fundoplication no abnormalities by Dr Ramey Blind biopsy esophagus showed normal mucosa    HERNIA REPAIR      x 2    KNEE SURGERY Left     SHOULDER ARTHROSCOPY Left 2003    x2    SHOULDER ARTHROSCOPY Right 04/2012    x2    TONSILLECTOMY      WRIST SURGERY Left     Wrist hardware - Titanium plate       Family History   Problem Relation Age of Onset    Asthma Mother     Diabetes type II Maternal Grandfather      I have reviewed and agree with the history as documented  E-Cigarette/Vaping    E-Cigarette Use Never User      E-Cigarette/Vaping Substances     Social History     Tobacco Use    Smoking status: Never Smoker    Smokeless tobacco: Former User     Quit date: 2010   Vaping Use    Vaping Use: Never used   Substance Use Topics    Alcohol use: Yes     Comment: Occasionally beer    Drug use: No       Review of Systems   Constitutional: Negative  HENT: Negative  Eyes: Negative  Respiratory: Positive for chest tightness  Cardiovascular: Negative  Gastrointestinal: Negative  Endocrine: Negative  Genitourinary: Negative  Musculoskeletal: Negative  Skin: Positive for color change  Allergic/Immunologic: Negative  Neurological: Negative  Hematological: Negative  Psychiatric/Behavioral: Negative  Physical Exam  Physical Exam  Constitutional:       Appearance: Normal appearance  He is well-developed  HENT:      Head: Normocephalic and atraumatic  Nose: Nose normal       Mouth/Throat:      Mouth: Mucous membranes are moist       Pharynx: Oropharynx is clear  Uvula midline  No pharyngeal swelling, oropharyngeal exudate or posterior oropharyngeal erythema  Comments: Airway is patent  Eyes:      Conjunctiva/sclera: Conjunctivae normal       Pupils: Pupils are equal, round, and reactive to light  Cardiovascular:      Rate and Rhythm: Normal rate  Pulmonary:      Effort: Pulmonary effort is normal  No respiratory distress  Breath sounds: Normal breath sounds and air entry  No stridor or decreased air movement  Abdominal:      Palpations: Abdomen is soft  Musculoskeletal:         General: Normal range of motion  Cervical back: Normal range of motion  Skin:     General: Skin is warm and dry  Neurological:      Mental Status: He is alert and oriented to person, place, and time  Psychiatric:         Mood and Affect: Mood is anxious           Behavior: Behavior normal          Vital Signs  ED Triage Vitals   Temperature Pulse Respirations Blood Pressure SpO2   06/17/22 1918 06/17/22 1918 06/17/22 1918 06/17/22 1930 06/17/22 1918   98 9 °F (37 2 °C) (!) 114 20 168/83 100 %      Temp src Heart Rate Source Patient Position - Orthostatic VS BP Location FiO2 (%)   -- 06/17/22 1918 -- -- --    Monitor         Pain Score       --                  Vitals:    06/17/22 1930 06/17/22 2000 06/17/22 2030 06/17/22 2045   BP: 168/83 148/70 (!) 171/74 (!) 177/83   Pulse: 95 103 98 99             ED Medications  Medications sodium chloride 0 9 % bolus 1,000 mL (0 mL Intravenous Stopped 6/17/22 2146)   diphenhydrAMINE (BENADRYL) injection 25 mg (25 mg Intravenous Given 6/17/22 1921)   Famotidine (PF) (PEPCID) injection 20 mg (20 mg Intravenous Given 6/17/22 1922)   methylPREDNISolone sodium succinate (Solu-MEDROL) injection 125 mg (125 mg Intravenous Given 6/17/22 1921)   EPINEPHrine PF (ADRENALIN) 1 mg/mL injection 0 3 mg (0 3 mg Intramuscular Given 6/17/22 1921)       Diagnostic Studies  Results Reviewed     None                 No orders to display                     ED Course  ED Course as of 06/17/22 2155 Fri Jun 17, 2022 1949 Patient resting comfortably, stable vital signs, lungs clear, airway patent, does report some relief of symptoms at this point   2059 Patient resting comfortably, stable vital signs, reports still feeling well, requesting to use the restroom, disconnected from monitor briefly to do so   2154 Patient continues to feel well, symptoms continuously improving, states he is ready to go home, vital signs remained stable, no acute distress, therefore discharged with prescriptions for symptom control, as well as recommendations for follow-up, advised to return if any additional concerns, patient acknowledges understanding and agreement with this plan                                               Disposition  Final diagnoses:    Allergic reaction, initial encounter   Bee sting reaction, undetermined intent, initial encounter     Time reflects when diagnosis was documented in both MDM as applicable and the Disposition within this note     Time User Action Codes Description Comment    6/17/2022  9:31 PM Gavino Mays Add [T78 40XA] Allergic reaction, initial encounter     6/17/2022  9:31 PM Jeison Wilkinson Add [I41 176Q] Bee sting reaction, undetermined intent, initial encounter       ED Disposition     ED Disposition   Discharge    Condition   Stable    Date/Time   Fri Jun 17, 2022  9:31 PM    Comment   Patti Atkinson Ruby Silverio  discharge to home/self care  Follow-up Information     Follow up With Specialties Details Why Contact Info    Miko Parkinson DO Family Medicine In 2 days  [address] ([place]) [phone]            Patient's Medications   Discharge Prescriptions    DIPHENHYDRAMINE (BENADRYL) 25 MG TABLET    Take 1 tablet (25 mg total) by mouth every 6 (six) hours       Start Date: 6/17/2022 End Date: --       Order Dose: 25 mg       Quantity: 20 tablet    Refills: 0    EPINEPHRINE (EPIPEN) 0 3 MG/0 3 ML SOAJ    Inject 0 3 mL (0 3 mg total) into a muscle once for 1 dose       Start Date: 6/17/2022 End Date: 6/17/2022       Order Dose: 0 3 mg       Quantity: 0 6 mL    Refills: 0    FAMOTIDINE (PEPCID) 20 MG TABLET    Take 1 tablet (20 mg total) by mouth 2 (two) times a day       Start Date: 6/17/2022 End Date: --       Order Dose: 20 mg       Quantity: 30 tablet    Refills: 0    METHYLPREDNISOLONE 4 MG TABLET THERAPY PACK    Use as directed on package       Start Date: 6/17/2022 End Date: --       Order Dose: --       Quantity: 21 tablet    Refills: 0       No discharge procedures on file      PDMP Review     None          ED Provider  Electronically Signed by           Krystin Jimenes DO  06/17/22 9745

## 2023-02-23 DIAGNOSIS — J40 BRONCHITIS: ICD-10-CM

## 2023-02-23 RX ORDER — ALBUTEROL SULFATE 90 UG/1
2 AEROSOL, METERED RESPIRATORY (INHALATION) EVERY 4 HOURS PRN
Qty: 8.5 G | Refills: 2 | Status: SHIPPED | OUTPATIENT
Start: 2023-02-23

## 2023-03-06 DIAGNOSIS — G44.021 INTRACTABLE CHRONIC CLUSTER HEADACHE: Primary | ICD-10-CM

## 2023-03-10 ENCOUNTER — OFFICE VISIT (OUTPATIENT)
Dept: FAMILY MEDICINE CLINIC | Facility: CLINIC | Age: 58
End: 2023-03-10

## 2023-03-10 VITALS
DIASTOLIC BLOOD PRESSURE: 100 MMHG | OXYGEN SATURATION: 96 % | BODY MASS INDEX: 40.52 KG/M2 | HEIGHT: 70 IN | HEART RATE: 84 BPM | SYSTOLIC BLOOD PRESSURE: 140 MMHG | WEIGHT: 283 LBS | TEMPERATURE: 97 F

## 2023-03-10 DIAGNOSIS — J98.8 RESPIRATORY INFECTION: Primary | ICD-10-CM

## 2023-03-10 RX ORDER — AZITHROMYCIN 250 MG/1
TABLET, FILM COATED ORAL
Qty: 6 TABLET | Refills: 0 | Status: SHIPPED | OUTPATIENT
Start: 2023-03-10 | End: 2023-03-14

## 2023-03-10 NOTE — PROGRESS NOTES
Assessment/Plan:    Problem List Items Addressed This Visit    None  Visit Diagnoses     Respiratory infection    -  Primary    Relevant Medications    azithromycin (Zithromax) 250 mg tablet    Other Relevant Orders    Covid/Flu- Office Collect           Diagnoses and all orders for this visit:    Respiratory infection  -     azithromycin (Zithromax) 250 mg tablet; Day 1 take 2 tablets, days 2-5 take 1 tablet  -     Covid/Flu- Office Collect        Subjective:      Patient ID: Maxine Bravo  is a 62 y o  male  Wilmar Skeeters is here today complaining of congestion, PND, and sore throat x few days  Pt states has had some sick contacts  States has not had a measurable fever but feels like he should have one  He denies GI symptoms  The following portions of the patient's history were reviewed and updated as appropriate:   He has a past medical history of Bee allergy status, Benign lipomatous neoplasm, Cellulitis of mid back region, High risk medication use, Internal derangement of knee, left, Pelvic cramping, Prostatism, and Sebaceous cyst ,  does not have any pertinent problems on file  ,   has a past surgical history that includes ARTHROSCOPY ELBOW (Left, 2003); ARTHROSCOPY KNEE (Right); Shoulder arthroscopy (Left, 2003); Shoulder arthroscopy (Right, 04/2012); TONSILLECTOMY; Wrist surgery (Left); Knee surgery (Left); Hernia repair; Colonoscopy (01/09/2013); EGD (2009); and EGD (03/2012)  ,  family history includes Asthma in his mother; Diabetes type II in his maternal grandfather  ,   reports that he has never smoked  He quit smokeless tobacco use about 13 years ago  He reports current alcohol use  He reports that he does not use drugs  ,  is allergic to bee venom and contrast  [iodinated contrast media]     Current Outpatient Medications   Medication Sig Dispense Refill   • azithromycin (Zithromax) 250 mg tablet Day 1 take 2 tablets, days 2-5 take 1 tablet   6 tablet 0   • fexofenadine (ALLEGRA) 180 MG tablet Take 180 mg by mouth     • Multiple Vitamins-Minerals (PX MENS MULTIVITAMINS) TABS Take 1 tablet by mouth daily     • albuterol (ProAir HFA) 90 mcg/act inhaler Inhale 2 puffs every 4 (four) hours as needed for wheezing or shortness of breath (Patient not taking: Reported on 3/10/2023) 8 5 g 2     No current facility-administered medications for this visit  Review of Systems   Constitutional: Negative for activity change, appetite change, chills, diaphoresis, fatigue, fever and unexpected weight change  HENT: Positive for congestion, postnasal drip, rhinorrhea and sore throat  Negative for ear pain, sinus pressure, sinus pain, sneezing, tinnitus and voice change  Eyes: Negative for pain, redness and visual disturbance  Respiratory: Positive for cough  Negative for chest tightness, shortness of breath and wheezing  Cardiovascular: Negative for chest pain, palpitations and leg swelling  Gastrointestinal: Negative for abdominal pain, blood in stool, constipation, diarrhea, nausea and vomiting  Genitourinary: Negative for difficulty urinating, dysuria, frequency, hematuria and urgency  Musculoskeletal: Negative for arthralgias, back pain, gait problem, joint swelling, myalgias, neck pain and neck stiffness  Skin: Negative for color change, pallor, rash and wound  Neurological: Negative for dizziness, tremors, weakness, light-headedness and headaches  Psychiatric/Behavioral: Negative for dysphoric mood, self-injury, sleep disturbance and suicidal ideas  The patient is not nervous/anxious  Objective:  Vitals:    03/10/23 0720   BP: 140/100   BP Location: Left arm   Patient Position: Sitting   Cuff Size: Standard   Pulse: 84   Temp: (!) 97 °F (36 1 °C)   TempSrc: Tympanic   SpO2: 96%   Weight: 128 kg (283 lb)   Height: 5' 10" (1 778 m)     Body mass index is 40 61 kg/m²  Physical Exam  Vitals reviewed  Constitutional:       General: He is not in acute distress       Appearance: He is well-developed  He is not diaphoretic  HENT:      Head: Normocephalic and atraumatic  Right Ear: Hearing, tympanic membrane, ear canal and external ear normal       Left Ear: Hearing, tympanic membrane, ear canal and external ear normal       Nose: Congestion and rhinorrhea present  Mouth/Throat:      Pharynx: Uvula midline  Posterior oropharyngeal erythema present  No oropharyngeal exudate  Eyes:      General: No scleral icterus  Right eye: No discharge  Left eye: No discharge  Conjunctiva/sclera: Conjunctivae normal    Neck:      Thyroid: No thyromegaly  Vascular: No carotid bruit  Cardiovascular:      Rate and Rhythm: Normal rate and regular rhythm  Heart sounds: Normal heart sounds  No murmur heard  Pulmonary:      Effort: Pulmonary effort is normal  No respiratory distress  Breath sounds: Normal breath sounds  No wheezing  Abdominal:      General: Bowel sounds are normal  There is no distension  Palpations: Abdomen is soft  There is no mass  Tenderness: There is no abdominal tenderness  There is no guarding or rebound  Musculoskeletal:         General: No tenderness  Normal range of motion  Cervical back: Neck supple  Lymphadenopathy:      Cervical: Cervical adenopathy present  Skin:     General: Skin is warm and dry  Findings: No erythema or rash  Neurological:      Mental Status: He is alert and oriented to person, place, and time  Psychiatric:         Behavior: Behavior normal          Thought Content:  Thought content normal          Judgment: Judgment normal

## 2023-03-12 LAB
FLUAV RNA RESP QL NAA+PROBE: NEGATIVE
FLUBV RNA RESP QL NAA+PROBE: NEGATIVE
SARS-COV-2 RNA RESP QL NAA+PROBE: NEGATIVE

## 2023-12-11 ENCOUNTER — EVALUATION (OUTPATIENT)
Dept: PHYSICAL THERAPY | Facility: CLINIC | Age: 58
End: 2023-12-11
Payer: COMMERCIAL

## 2023-12-11 DIAGNOSIS — M25.562 CHRONIC PAIN OF LEFT KNEE: ICD-10-CM

## 2023-12-11 DIAGNOSIS — G89.29 CHRONIC PAIN OF LEFT KNEE: ICD-10-CM

## 2023-12-11 DIAGNOSIS — Z96.652 STATUS POST LEFT PARTIAL KNEE REPLACEMENT: Primary | ICD-10-CM

## 2023-12-11 PROCEDURE — 97535 SELF CARE MNGMENT TRAINING: CPT | Performed by: PHYSICAL THERAPIST

## 2023-12-11 PROCEDURE — 97140 MANUAL THERAPY 1/> REGIONS: CPT | Performed by: PHYSICAL THERAPIST

## 2023-12-11 PROCEDURE — 97161 PT EVAL LOW COMPLEX 20 MIN: CPT | Performed by: PHYSICAL THERAPIST

## 2023-12-11 PROCEDURE — 97110 THERAPEUTIC EXERCISES: CPT | Performed by: PHYSICAL THERAPIST

## 2023-12-11 NOTE — LETTER
2023    Raghu Coyle, [address] [facility] [address]  [facility] [address]    Patient: Spenser Cadena YOB: 1965   Date of Visit: 2023     Encounter Diagnosis     ICD-10-CM    1. Status post left partial knee replacement  Z96.652       2. Chronic pain of left knee  M25.562     G89.29           Dear Dr. Sara Barry: Thank you for your recent referral of Spenser Cadena . Please review the attached evaluation summary from Ancelmo's recent visit. Please verify that you agree with the plan of care by signing the attached order. If you have any questions or concerns, please do not hesitate to call. I sincerely appreciate the opportunity to share in the care of one of your patients and hope to have another opportunity to work with you in the near future. Sincerely,    Manolo Osorio, PT, DPT, ATC, ART      Referring Provider:      I certify that I have read the below Plan of Care and certify the need for these services furnished under this plan of treatment while under my care. Raghu Coyle MD  [address]  [address] [address]  Via Fax: [phone]          PT Evaluation     Today's date: 2023  Patient name: Spenser Rivas. : 1965  MRN: [id number]  Referring provider: Raghu Coyle MD  Dx:   Encounter Diagnosis     ICD-10-CM    1. Status post left partial knee replacement  Z96.652       2.  Chronic pain of left knee  M25.562     G89.29                      Assessment  Understanding of Dx/Px/POC: good   Prognosis: good    Goals  STGs: To be complete within 4 weeks  - Decrease pain to < 2/10 at worst  - Increase AROM to WNL  - Increase strength to > 4+/5  - Improve gait to WNL for distances < 6 blocks    - Able to walk for any extended amount of time/distance without pain or limitation for increased safety and functional capacity with ADLs and work-related duty  - Able to repetitively squat without pain or limitation for increased safety and functional capacity with ADLs and work-related duty  - Able to repetitively ascend/descend a full flight of stairs without pain or limitation for increased safety and functional capacity with ADLs and work-related duty  - Able to repetitively complete transfers without pain or limitation for increased safety and functional capacity with ADLs and work-related duty  - Able to keel for any extended amount of time without pain or limitation for increased safety and functional capacity with ADLs and work-related duty    Plan  Planned therapy interventions: manual therapy, neuromuscular re-education, patient education, self care, therapeutic activities, therapeutic exercise, gait training and home exercise program  Frequency: 3x week  Duration in weeks: 6       Pt is a very 62 y.o. male with L knee pain who presents with functional deficits including decreased capacity with walking, squatting, kneeling, stairs, and transfers. Upon completion of today's initial evaluation, Ancelmo's sx remain consistent with being s/p L partial knee replacement 23. Patient will benefit from skilled physical therapy to address current deficits. Subjective Evaluation    Patient Goals  Patient goals for therapy: increased strength, decreased pain, increased motion and return to work    Pain  Current pain ratin  At best pain ratin  At worst pain ratin  Location: R Knee         Pt reports he is s/p L Knee partial knee replacement 23. Objective Pain level ranges 2-8/10  AROM: L Knee 0-80 degrees; R Knee 0-125 degrees  Strength: L quad and hamstrings 2+/5; R Quad and HS 5/5  Gait: L Knee extension lag, mod lateral limp  Swelling: Mod (will continue to monitor)  Incisions: Clean and healing well (no present signs/sx of infection.  Will continue to monitor)  Isa's sign: (-) (patient instructed on signs and sx to watch for and to get to ED ASAP if noticing signs/sx)  FOTO: 55; GOAL: 70  Unable to walk without pain and limitation  Unable to squat without pain and limitation  Unable complete transfers without pain and limitation  Unable to ascend/descend stairs without pain and limitation  Unable to kneel without pain and limitation        Precautions: MD protocol for L Knee Partial Knee arthroscopy 12/5/23    Daily Treatment Diary    HEP: Handout provided and discussed      Manuals 12/11/23       ART        PROM/Stretch x15'       IASTM        STM/Triggerpoint        JM                Neuro Re-Ed        Standing 1/2 Roll calf stretch 6x20''       SL Ecc HR        SL Balance AE  This session      Tall Box distal HS Stretch  This session                                      Ther Ex        HS into QS 2x10 Add SLR      HR/TR 2x10       TB TKE 2x10 L5       TB Heel to Toes  This session      Sit to stand  This session      TRX Squats  This session      Bridge with Add squeeze        SL Bridge        Clam shells        SL Sit to Stand        BOSU SLB        Upside down BOSU SL squat holds        TB Lat Walks  This session      Step ups/downs  This session                      Ther Activity        TM        Bike  This session      NuStep        Forward/backward heel to toe walk  This session              Gait Training                                        Modalities        MHP        CP x10'       US/Stim

## 2023-12-11 NOTE — PROGRESS NOTES
PT Evaluation     Today's date: 2023  Patient name: Cindy Fernandez. : 1965  MRN: [id number]  Referring provider: Alejandra Pearson MD  Dx:   Encounter Diagnosis     ICD-10-CM    1. Status post left partial knee replacement  Z96.652       2. Chronic pain of left knee  M25.562     G89.29                      Assessment  Understanding of Dx/Px/POC: good   Prognosis: good    Goals  STGs: To be complete within 4 weeks  - Decrease pain to < 2/10 at worst  - Increase AROM to WNL  - Increase strength to > 4+/5  - Improve gait to WNL for distances < 6 blocks    - Able to walk for any extended amount of time/distance without pain or limitation for increased safety and functional capacity with ADLs and work-related duty  - Able to repetitively squat without pain or limitation for increased safety and functional capacity with ADLs and work-related duty  - Able to repetitively ascend/descend a full flight of stairs without pain or limitation for increased safety and functional capacity with ADLs and work-related duty  - Able to repetitively complete transfers without pain or limitation for increased safety and functional capacity with ADLs and work-related duty  - Able to keel for any extended amount of time without pain or limitation for increased safety and functional capacity with ADLs and work-related duty    Plan  Planned therapy interventions: manual therapy, neuromuscular re-education, patient education, self care, therapeutic activities, therapeutic exercise, gait training and home exercise program  Frequency: 3x week  Duration in weeks: 6       Pt is a very 62 y.o. male with L knee pain who presents with functional deficits including decreased capacity with walking, squatting, kneeling, stairs, and transfers. Upon completion of today's initial evaluation, Ancelmo's sx remain consistent with being s/p L partial knee replacement 23.  Patient will benefit from skilled physical therapy to address current deficits. Subjective Evaluation    Patient Goals  Patient goals for therapy: increased strength, decreased pain, increased motion and return to work    Pain  Current pain ratin  At best pain ratin  At worst pain ratin  Location: R Knee         Pt reports he is s/p L Knee partial knee replacement 23. Objective Pain level ranges 2-8/10  AROM: L Knee 0-80 degrees; R Knee 0-125 degrees  Strength: L quad and hamstrings 2+/5; R Quad and HS 5/5  Gait: L Knee extension lag, mod lateral limp  Swelling: Mod (will continue to monitor)  Incisions: Clean and healing well (no present signs/sx of infection.  Will continue to monitor)  Isa's sign: (-) (patient instructed on signs and sx to watch for and to get to ED ASAP if noticing signs/sx)  FOTO: 46; GOAL: 70  Unable to walk without pain and limitation  Unable to squat without pain and limitation  Unable complete transfers without pain and limitation  Unable to ascend/descend stairs without pain and limitation  Unable to kneel without pain and limitation        Precautions: MD protocol for L Knee Partial Knee arthroscopy 23    Daily Treatment Diary    HEP: Handout provided and discussed      Manuals 23       ART        PROM/Stretch x15'       IASTM        STM/Triggerpoint        JM                Neuro Re-Ed        Standing 1/2 Roll calf stretch 6x20''       SL Ecc HR        SL Balance AE  This session      Tall Box distal HS Stretch  This session                                      Ther Ex        HS into QS 2x10 Add SLR      HR/TR 2x10       TB TKE 2x10 L5       TB Heel to Toes  This session      Sit to stand  This session      TRX Squats  This session      Bridge with Add squeeze        SL Bridge        Clam shells        SL Sit to Stand        BOSU SLB        Upside down BOSU SL squat holds        TB Lat Walks  This session      Step ups/downs  This session                      Ther Activity        TM        Bike  This session NuStep        Forward/backward heel to toe walk  This session              Gait Training                                        Modalities        MHP        CP x10'       US/Stim

## 2023-12-13 ENCOUNTER — OFFICE VISIT (OUTPATIENT)
Dept: PHYSICAL THERAPY | Facility: CLINIC | Age: 58
End: 2023-12-13
Payer: COMMERCIAL

## 2023-12-13 DIAGNOSIS — Z96.652 STATUS POST LEFT PARTIAL KNEE REPLACEMENT: Primary | ICD-10-CM

## 2023-12-13 DIAGNOSIS — M25.562 CHRONIC PAIN OF LEFT KNEE: ICD-10-CM

## 2023-12-13 DIAGNOSIS — G89.29 CHRONIC PAIN OF LEFT KNEE: ICD-10-CM

## 2023-12-13 PROCEDURE — 97530 THERAPEUTIC ACTIVITIES: CPT | Performed by: PHYSICAL THERAPIST

## 2023-12-13 PROCEDURE — 97112 NEUROMUSCULAR REEDUCATION: CPT | Performed by: PHYSICAL THERAPIST

## 2023-12-13 PROCEDURE — 97110 THERAPEUTIC EXERCISES: CPT | Performed by: PHYSICAL THERAPIST

## 2023-12-13 PROCEDURE — 97140 MANUAL THERAPY 1/> REGIONS: CPT | Performed by: PHYSICAL THERAPIST

## 2023-12-13 NOTE — PROGRESS NOTES
Daily Note     Today's date: 2023  Patient name: Alaina Moore. : 1965  MRN: [id number]  Referring provider: Tacos Joshi MD  Dx:   Encounter Diagnosis     ICD-10-CM    1. Status post left partial knee replacement  Z96.652       2. Chronic pain of left knee  M25.562     G89.29                      Subjective: Pt reports HEP going well. Continues to be on top of monitoring incision and sx of infection. No setbacks. Anxious to continue making progress. Objective: See treatment diary below      Assessment: Tolerated treatment well. Patient demonstrated fatigue post treatment, exhibited good technique with therapeutic exercises, and would benefit from continued PT      Plan: Continue per plan of care. Progress treatment as tolerated.          Precautions: MD protocol for L Knee Partial Knee arthroscopy 23    Daily Treatment Diary    HEP: Handout provided and discussed      Manuals 23      ART        PROM/Stretch x15' x15'      IASTM        STM/Triggerpoint        JM                Neuro Re-Ed        Standing 1/ Roll calf stretch 6x20'' 6x20''      SL Ecc HR        SL Balance AE  This session      Tall Box distal HS Stretch  6x20''                                      Ther Ex        HS into QS into SLR 2x10 2x10      HR/TR 2x10 3x10      TB TKE 2x10 L5 3x10 L5      TB Heel to Toes  This session      Sit to stand  This session      TRX Squats  2x10      Bridge with Add squeeze        SL Bridge        Clam shells        SL Sit to Stand        BOSU SLB        Upside down BOSU SL squat holds        TB Lat Walks  This session      Step ups/downs  This session                      Ther Activity        TM        Bike  x10'      NuStep        Forward/backward heel to toe walk  2x10              Gait Training                                        Modalities        MHP        CP x10' x10'      US/Stim

## 2023-12-18 ENCOUNTER — OFFICE VISIT (OUTPATIENT)
Dept: PHYSICAL THERAPY | Facility: CLINIC | Age: 58
End: 2023-12-18
Payer: COMMERCIAL

## 2023-12-18 DIAGNOSIS — Z96.652 STATUS POST LEFT PARTIAL KNEE REPLACEMENT: Primary | ICD-10-CM

## 2023-12-18 DIAGNOSIS — G89.29 CHRONIC PAIN OF LEFT KNEE: ICD-10-CM

## 2023-12-18 DIAGNOSIS — M25.562 CHRONIC PAIN OF LEFT KNEE: ICD-10-CM

## 2023-12-18 PROCEDURE — 97112 NEUROMUSCULAR REEDUCATION: CPT | Performed by: PHYSICAL THERAPIST

## 2023-12-18 PROCEDURE — 97530 THERAPEUTIC ACTIVITIES: CPT | Performed by: PHYSICAL THERAPIST

## 2023-12-18 PROCEDURE — 97110 THERAPEUTIC EXERCISES: CPT | Performed by: PHYSICAL THERAPIST

## 2023-12-18 NOTE — PROGRESS NOTES
Daily Note     Today's date: 2023  Patient name: Ancelmo Rosales Jr.  : 1965  MRN: [id number]  Referring provider: Bere Martinez MD  Dx:   Encounter Diagnosis     ICD-10-CM    1. Status post left partial knee replacement  Z96.652       2. Chronic pain of left knee  M25.562     G89.29                      Subjective: Pt reports HEP going well. Incision looks good. No setbacks. Anxious to continue making progress.      Objective: See treatment diary below      Assessment: Tolerated treatment well. Patient demonstrated fatigue post treatment, exhibited good technique with therapeutic exercises, and would benefit from continued PT      Plan: Continue per plan of care.  Progress treatment as tolerated.         Precautions: MD protocol for L Knee Partial Knee arthroscopy 23    Daily Treatment Diary    HEP: Handout provided and discussed      Manuals 23     ART        PROM/Stretch x15' x15' x15'     IASTM        STM/Triggerpoint        JM                Neuro Re-Ed        Standing 1/2 Roll calf stretch 6x20'' 6x20'' 6x20''     SL Ecc HR        SL Balance AE  This session      Tall Box distal HS Stretch  6x20'' 6x20''                                     Ther Ex        HS into QS into SLR 2x10 2x10 2x10     HR/TR 2x10 3x10 3x10     TB TKE 2x10 L5 3x10 L5 3x10 L5     TB Heel to Toes  This session 3x10 L4     Sit to stand  This session      TRX Squats  2x10 3x10     Bridge with Add squeeze        SL Bridge   3x10     Clam shells        SL Sit to Stand        BOSU SLB        Upside down BOSU SL squat holds        TB Lat Walks  This session      Step ups/downs  This session 2x10 14''                     Ther Activity        TM        Bike  x10' x10'     NuStep        Forward/backward heel to toe walk  2x10 2x10             Gait Training                                        Modalities        MHP        CP x10' x10' x10'     US/Stim

## 2023-12-20 ENCOUNTER — APPOINTMENT (OUTPATIENT)
Dept: PHYSICAL THERAPY | Facility: CLINIC | Age: 58
End: 2023-12-20
Payer: COMMERCIAL

## 2023-12-20 ENCOUNTER — OFFICE VISIT (OUTPATIENT)
Dept: URGENT CARE | Facility: MEDICAL CENTER | Age: 58
End: 2023-12-20
Payer: COMMERCIAL

## 2023-12-20 VITALS
TEMPERATURE: 99.2 F | HEART RATE: 88 BPM | OXYGEN SATURATION: 98 % | BODY MASS INDEX: 38.31 KG/M2 | RESPIRATION RATE: 18 BRPM | DIASTOLIC BLOOD PRESSURE: 78 MMHG | SYSTOLIC BLOOD PRESSURE: 102 MMHG | WEIGHT: 267 LBS

## 2023-12-20 DIAGNOSIS — B34.9 VIRAL ILLNESS: ICD-10-CM

## 2023-12-20 DIAGNOSIS — L50.9 URTICARIA: Primary | ICD-10-CM

## 2023-12-20 PROCEDURE — 99212 OFFICE O/P EST SF 10 MIN: CPT | Performed by: PHYSICIAN ASSISTANT

## 2023-12-20 PROCEDURE — 96372 THER/PROPH/DIAG INJ SC/IM: CPT | Performed by: PHYSICIAN ASSISTANT

## 2023-12-20 RX ORDER — METHYLPREDNISOLONE SODIUM SUCCINATE 40 MG/ML
40 INJECTION, POWDER, LYOPHILIZED, FOR SOLUTION INTRAMUSCULAR; INTRAVENOUS ONCE
Status: COMPLETED | OUTPATIENT
Start: 2023-12-20 | End: 2023-12-20

## 2023-12-20 RX ORDER — METHYLPREDNISOLONE 4 MG/1
TABLET ORAL
Qty: 1 EACH | Refills: 0 | Status: SHIPPED | OUTPATIENT
Start: 2023-12-20

## 2023-12-20 RX ORDER — FAMOTIDINE 20 MG/1
20 TABLET, FILM COATED ORAL 2 TIMES DAILY
Qty: 30 TABLET | Refills: 0 | Status: SHIPPED | OUTPATIENT
Start: 2023-12-20

## 2023-12-20 RX ADMIN — METHYLPREDNISOLONE SODIUM SUCCINATE 40 MG: 40 INJECTION, POWDER, LYOPHILIZED, FOR SOLUTION INTRAMUSCULAR; INTRAVENOUS at 17:07

## 2023-12-20 NOTE — PROGRESS NOTES
[facility]        NAME: Ancelmo Rosales Jr. is a 58 y.o. male  : 1965    MRN: [id number]  DATE: 2023  TIME: 5:12 PM    Assessment and Plan   Urticaria [L50.9]  1. Urticaria  methylPREDNISolone sodium succinate (Solu-MEDROL) injection 40 mg    famotidine (PEPCID) 20 mg tablet    methylPREDNISolone 4 MG tablet therapy pack      2. Viral illness              Patient Instructions     Start Medrol dose pack tomorrow  Allegra or Zyrtec once daily  Take Pepcid twice daily  If symptoms worsen go to the emergency room for further evaluation  You may use cold medication to control any cold-like symptoms.  Follow up with PCP in 3-5 days.  Proceed to  ER if symptoms worsen.    Chief Complaint     Chief Complaint   Patient presents with   • Cough   • Headache   • Nasal Congestion     SX started on , has chills on Monday. Has taken dayquil and nyquil. Now has a itchy rash on his body. B/L arms and legs that started today. 1200 took 2 benadryl. @ home covid neg, today and yesterday   • Rash   • Fever         History of Present Illness       Started with cold-like symptoms 4 days ago.  Had a chills on Monday and had a fever.  Now started with blotchy itchy rash throughout his body.  He took 2 Benadryl at 12:00 noon which did not improve symptoms.  COVID test negative.  No new medications.        Review of Systems   Review of Systems   Constitutional:  Negative for chills and fever.   HENT:  Positive for congestion. Negative for rhinorrhea and sore throat.    Respiratory:  Positive for cough. Negative for chest tightness, shortness of breath and wheezing.    Cardiovascular:  Negative for chest pain.   Skin:  Positive for rash.   Neurological:  Positive for headaches.         Current Medications       Current Outpatient Medications:   •  famotidine (PEPCID) 20 mg tablet, Take 1 tablet (20 mg total) by mouth 2 (two) times a day, Disp: 30 tablet, Rfl: 0  •  methylPREDNISolone 4 MG tablet therapy  pack, Use as directed on package, Disp: 1 each, Rfl: 0  •  albuterol (ProAir HFA) 90 mcg/act inhaler, Inhale 2 puffs every 4 (four) hours as needed for wheezing or shortness of breath (Patient not taking: Reported on 3/10/2023), Disp: 8.5 g, Rfl: 2  •  fexofenadine (ALLEGRA) 180 MG tablet, Take 180 mg by mouth (Patient not taking: Reported on 12/20/2023), Disp: , Rfl:   •  Multiple Vitamins-Minerals (PX MENS MULTIVITAMINS) TABS, Take 1 tablet by mouth daily (Patient not taking: Reported on 12/20/2023), Disp: , Rfl:   No current facility-administered medications for this visit.    Current Allergies     Allergies as of 12/20/2023 - Reviewed 12/20/2023   Allergen Reaction Noted   • Bee venom Anaphylaxis 09/09/2014   • Contrast  [iodinated contrast media] Rash 10/02/2012            The following portions of the patient's history were reviewed and updated as appropriate: allergies, current medications, past family history, past medical history, past social history, past surgical history and problem list.     Past Medical History:   Diagnosis Date   • Bee allergy status     Last Assessed 8/25/2016   • Benign lipomatous neoplasm     Last Assessed 7/7/2017   • Cellulitis of mid back region     Last Assessed 11/13/2016   • High risk medication use     Last Assessed 12/21/2015   • Internal derangement of knee, left     Last Assessed 2/28/2017   • Pelvic cramping     Last Assessed 8/25/2016   • Prostatism     Last Assessed 8/25/2016   • Sebaceous cyst     Last Assessed 7/01/2017       Past Surgical History:   Procedure Laterality Date   • ARTHROSCOPY ELBOW Left 2003   • ARTHROSCOPY KNEE Right    • COLONOSCOPY  01/09/2013    normal   • EGD  2009    regular Z line and biopsy negative for intestinal metaplasia by Dr. Avila   • EGD  03/2012    intact fundoplication no abnormalities by Dr. Avila biopsy esophagus showed normal mucosa   • HERNIA REPAIR      x 2   • KNEE SURGERY Left    • REPLACEMENT TOTAL KNEE BILATERAL     •  SHOULDER ARTHROSCOPY Left 2003    x2   • SHOULDER ARTHROSCOPY Right 04/2012    x2   • TONSILLECTOMY     • WRIST SURGERY Left     Wrist hardware - Titanium plate       Family History   Problem Relation Age of Onset   • Asthma Mother    • Diabetes type II Maternal Grandfather          Medications have been verified.        Objective   /78   Pulse 88   Temp 99.2 °F (37.3 °C)   Resp 18   Wt 121 kg (267 lb)   SpO2 98%   BMI 38.31 kg/m²   No LMP for male patient.       Physical Exam     Physical Exam  Vitals and nursing note reviewed.   Constitutional:       Appearance: Normal appearance.   HENT:      Head: Normocephalic and atraumatic.      Right Ear: Tympanic membrane normal.      Left Ear: Tympanic membrane normal.      Mouth/Throat:      Mouth: Mucous membranes are moist.      Pharynx: Oropharynx is clear.   Eyes:      Conjunctiva/sclera: Conjunctivae normal.   Cardiovascular:      Rate and Rhythm: Normal rate and regular rhythm.      Heart sounds: Normal heart sounds.   Pulmonary:      Effort: Pulmonary effort is normal.      Breath sounds: Normal breath sounds.   Musculoskeletal:      Cervical back: Neck supple.   Lymphadenopathy:      Cervical: No cervical adenopathy.   Skin:     General: Skin is warm and dry.   Neurological:      Mental Status: He is alert.

## 2023-12-20 NOTE — PATIENT INSTRUCTIONS
Start Medrol dose pack tomorrow  Allegra or Zyrtec once daily  Take Pepcid twice daily  If symptoms worsen go to the emergency room for further evaluation  You may use cold medication to control any cold-like symptoms.

## 2023-12-22 ENCOUNTER — OFFICE VISIT (OUTPATIENT)
Dept: FAMILY MEDICINE CLINIC | Facility: CLINIC | Age: 58
End: 2023-12-22
Payer: COMMERCIAL

## 2023-12-22 VITALS
SYSTOLIC BLOOD PRESSURE: 132 MMHG | DIASTOLIC BLOOD PRESSURE: 98 MMHG | BODY MASS INDEX: 37.94 KG/M2 | OXYGEN SATURATION: 99 % | TEMPERATURE: 96.4 F | HEART RATE: 78 BPM | HEIGHT: 70 IN | WEIGHT: 265 LBS

## 2023-12-22 DIAGNOSIS — L50.9 URTICARIA: Primary | ICD-10-CM

## 2023-12-22 PROCEDURE — 99214 OFFICE O/P EST MOD 30 MIN: CPT | Performed by: FAMILY MEDICINE

## 2023-12-22 PROCEDURE — 96372 THER/PROPH/DIAG INJ SC/IM: CPT

## 2023-12-22 RX ORDER — EPINEPHRINE 0.3 MG/.3ML
0.3 INJECTION SUBCUTANEOUS ONCE
Qty: 0.6 ML | Refills: 0 | Status: SHIPPED | OUTPATIENT
Start: 2023-12-22 | End: 2023-12-22

## 2023-12-22 RX ORDER — RIZATRIPTAN BENZOATE 10 MG/1
10 TABLET ORAL
COMMUNITY
Start: 2023-11-21 | End: 2023-12-22 | Stop reason: ALTCHOICE

## 2023-12-22 RX ORDER — TOPIRAMATE 50 MG/1
50 TABLET, FILM COATED ORAL 2 TIMES DAILY
COMMUNITY
Start: 2023-11-21 | End: 2023-12-22 | Stop reason: ALTCHOICE

## 2023-12-22 RX ORDER — MONTELUKAST SODIUM 10 MG/1
10 TABLET ORAL
Qty: 30 TABLET | Refills: 5 | Status: SHIPPED | OUTPATIENT
Start: 2023-12-22

## 2023-12-22 NOTE — PROGRESS NOTES
Depression Screening and Follow-up Plan: Patient was screened for depression during today's encounter. They screened negative with a PHQ-2 score of 0.    Assessment/Plan: Added montelukast Ztec gave the patient 20 mg of Depo-Medrol patient will report back to us Tuesday    Problem List Items Addressed This Visit    None  Visit Diagnoses       Urticaria    -  Primary    Relevant Medications    montelukast (SINGULAIR) 10 mg tablet    methylPREDNISolone acetate (DEPO-MEDROL) injection 20 mg    EPINEPHrine (EPIPEN) 0.3 mg/0.3 mL SOAJ             Diagnoses and all orders for this visit:    Urticaria  -     montelukast (SINGULAIR) 10 mg tablet; Take 1 tablet (10 mg total) by mouth daily at bedtime  -     methylPREDNISolone acetate (DEPO-MEDROL) injection 20 mg  -     EPINEPHrine (EPIPEN) 0.3 mg/0.3 mL SOAJ; Inject 0.3 mL (0.3 mg total) into a muscle once for 1 dose    Other orders  -     Discontinue: rizatriptan (MAXALT) 10 mg tablet; Take 10 mg by mouth (Patient not taking: Reported on 12/22/2023)  -     Discontinue: topiramate (TOPAMAX) 50 MG tablet; Take 50 mg by mouth 2 (two) times a day (Patient not taking: Reported on 12/22/2023)        No problem-specific Assessment & Plan notes found for this encounter.        Subjective:      Patient ID: Ancelmo Rosales Jr. is a 58 y.o. male.    Patient developed a urticaria he had its primarily on his arms legs back seems to be okay he went to the urgent care he was treated with 40 mg of Solu-Medrol intramuscularly he was given a Medrol Dosepak Pepcid 20 mg twice daily and was told to take Allegra he still Hy-Vee particularly on his arms he has giant urticaria he did stop all of his other medicines which was wise he was on meloxicam and aspirin because of a recent knee surgery patient will continue his Pepcid continue his Medrol Dosepak we will get a give another 20 mg of Depo-Medrol and Romulo to switch him from Allegra to Zyrtec and have him double dose and I also  prescribed montelukast    Rash        The following portions of the patient's history were reviewed and updated as appropriate:   He has a past medical history of Bee allergy status, Benign lipomatous neoplasm, Cellulitis of mid back region, High risk medication use, Internal derangement of knee, left, Pelvic cramping, Prostatism, and Sebaceous cyst.,  does not have any pertinent problems on file.,   has a past surgical history that includes ARTHROSCOPY ELBOW (Left, 2003); ARTHROSCOPY KNEE (Right); Shoulder arthroscopy (Left, 2003); Shoulder arthroscopy (Right, 04/2012); TONSILLECTOMY; Wrist surgery (Left); Knee surgery (Left); Hernia repair; Colonoscopy (01/09/2013); EGD (2009); EGD (03/2012); Replacement total knee bilateral; and Partial knee arthroplasty (Left, 12/05/2023).,  family history includes Asthma in his mother; Diabetes type II in his maternal grandfather.,   reports that he has never smoked. He quit smokeless tobacco use about 13 years ago. He reports current alcohol use. He reports that he does not use drugs.,  is allergic to bee venom and contrast  [iodinated contrast media]..  Current Outpatient Medications   Medication Sig Dispense Refill    EPINEPHrine (EPIPEN) 0.3 mg/0.3 mL SOAJ Inject 0.3 mL (0.3 mg total) into a muscle once for 1 dose 0.6 mL 0    famotidine (PEPCID) 20 mg tablet Take 1 tablet (20 mg total) by mouth 2 (two) times a day 30 tablet 0    methylPREDNISolone 4 MG tablet therapy pack Use as directed on package 1 each 0    montelukast (SINGULAIR) 10 mg tablet Take 1 tablet (10 mg total) by mouth daily at bedtime 30 tablet 5     Current Facility-Administered Medications   Medication Dose Route Frequency Provider Last Rate Last Admin    methylPREDNISolone acetate (DEPO-MEDROL) injection 20 mg  20 mg Intramuscular Once Castro Richard, DO           Review of Systems   Skin:  Positive for rash.         Objective:  Vitals:    12/22/23 0952   BP: 132/98   BP Location: Left arm   Patient  "Position: Sitting   Cuff Size: Large   Pulse: 78   Temp: (!) 96.4 °F (35.8 °C)   TempSrc: Temporal   SpO2: 99%   Weight: 120 kg (265 lb)   Height: 5' 10\" (1.778 m)     Body mass index is 38.02 kg/m².     Physical Exam    "

## 2023-12-26 ENCOUNTER — TELEPHONE (OUTPATIENT)
Dept: FAMILY MEDICINE CLINIC | Facility: CLINIC | Age: 58
End: 2023-12-26

## 2023-12-26 NOTE — TELEPHONE ENCOUNTER
Follow-up - Allergic reaction - Hives  Steroid Pack complete    Still has 3 blotches on legs - They had cleared up & then re-appeared.  Everything else cleared up    Any recommendations or Tx?

## 2023-12-27 ENCOUNTER — TELEPHONE (OUTPATIENT)
Age: 58
End: 2023-12-27

## 2023-12-27 ENCOUNTER — APPOINTMENT (OUTPATIENT)
Dept: PHYSICAL THERAPY | Facility: CLINIC | Age: 58
End: 2023-12-27
Payer: COMMERCIAL

## 2023-12-27 NOTE — TELEPHONE ENCOUNTER
Was trying to assist the patient with finding an office with a sooner appt. Was looking at [place] and call dropped

## 2023-12-28 ENCOUNTER — OFFICE VISIT (OUTPATIENT)
Dept: URGENT CARE | Facility: MEDICAL CENTER | Age: 58
End: 2023-12-28
Payer: COMMERCIAL

## 2023-12-28 VITALS
BODY MASS INDEX: 38.22 KG/M2 | DIASTOLIC BLOOD PRESSURE: 96 MMHG | TEMPERATURE: 97.7 F | WEIGHT: 267 LBS | RESPIRATION RATE: 16 BRPM | OXYGEN SATURATION: 97 % | HEART RATE: 89 BPM | HEIGHT: 70 IN | SYSTOLIC BLOOD PRESSURE: 130 MMHG

## 2023-12-28 DIAGNOSIS — R10.30 LOWER ABDOMINAL PAIN: Primary | ICD-10-CM

## 2023-12-28 LAB
SL AMB  POCT GLUCOSE, UA: ABNORMAL
SL AMB LEUKOCYTE ESTERASE,UA: ABNORMAL
SL AMB POCT BILIRUBIN,UA: ABNORMAL
SL AMB POCT BLOOD,UA: ABNORMAL
SL AMB POCT CLARITY,UA: CLEAR
SL AMB POCT COLOR,UA: YELLOW
SL AMB POCT KETONES,UA: ABNORMAL
SL AMB POCT NITRITE,UA: ABNORMAL
SL AMB POCT PH,UA: 6
SL AMB POCT SPECIFIC GRAVITY,UA: 1.02
SL AMB POCT URINE PROTEIN: ABNORMAL
SL AMB POCT UROBILINOGEN: 0.2

## 2023-12-28 PROCEDURE — 87086 URINE CULTURE/COLONY COUNT: CPT | Performed by: PHYSICIAN ASSISTANT

## 2023-12-28 PROCEDURE — 99213 OFFICE O/P EST LOW 20 MIN: CPT | Performed by: PHYSICIAN ASSISTANT

## 2023-12-28 PROCEDURE — 81002 URINALYSIS NONAUTO W/O SCOPE: CPT | Performed by: PHYSICIAN ASSISTANT

## 2023-12-28 RX ORDER — CIPROFLOXACIN 500 MG/1
500 TABLET, FILM COATED ORAL EVERY 12 HOURS SCHEDULED
Qty: 20 TABLET | Refills: 0 | Status: SHIPPED | OUTPATIENT
Start: 2023-12-28 | End: 2024-01-07

## 2023-12-28 NOTE — PATIENT INSTRUCTIONS
Medication as prescribed  Monitor for new/worsening symptoms  Follow up with PCP in 3-5 days.  Proceed to  ER if symptoms worsen.    The class of medications call fluoroquinolones (Ex: Ciprofloaxcin, Levofloxacin, Moxifloxacin) is associated with an increased risk of tendon rupture during and occasionally up to months after treatment. Stop taking fluoroquinolone, avoid exercise and use of affected area, and call PCP ASAP if you develop tendon pain, swelling or inflammation.     Eat yogurt with live and active cultures and/or take a probiotic at least 3 hours before or after antibiotic dose. Monitor stool for diarrhea and/or blood. If this occurs, contact primary care doctor ASAP.

## 2023-12-28 NOTE — PROGRESS NOTES
[facility]        NAME: Ancelmo Rosales Jr. is a 58 y.o. male  : 1965    MRN: [id number]  DATE: 2023  TIME: 2:24 PM    Assessment and Plan   Lower abdominal pain [R10.30]  1. Lower abdominal pain  POCT urine dip    ciprofloxacin (CIPRO) 500 mg tablet    Urine culture        Possible prostatitis. Will tx and recommended proceeding to ED if symptoms worsen.     Patient Instructions     Medication as prescribed  Monitor for new/worsening symptoms  Follow up with PCP in 3-5 days.  Proceed to  ER if symptoms worsen.    The class of medications call fluoroquinolones (Ex: Ciprofloaxcin, Levofloxacin, Moxifloxacin) is associated with an increased risk of tendon rupture during and occasionally up to months after treatment. Stop taking fluoroquinolone, avoid exercise and use of affected area, and call PCP ASAP if you develop tendon pain, swelling or inflammation.     Eat yogurt with live and active cultures and/or take a probiotic at least 3 hours before or after antibiotic dose. Monitor stool for diarrhea and/or blood. If this occurs, contact primary care doctor ASAP.         Chief Complaint     Chief Complaint   Patient presents with    Abdominal Pain     Lower abdominal pain , has had problems with his prostate before and experienced the same pain           History of Present Illness       Reports 1 week history of mid to left lower abdominal discomfort that is worse with sitting or laying. Denies fevers, chills, vomiting, hematochezia, melena, diarrhea, dysuria, urinary frequency/urgency, hematuria, testicular pain/swelling, alteration in urinary stream, or constipation. States he had the exact same symptoms a few years ago where he was dx with prostatitis and prescribed Ciprofloxacin with resolution of symptoms. Patient called Urology but states they are unable to get him in to be seen until February.         Review of Systems   Review of Systems   Constitutional:  Negative for chills  and fever.   Gastrointestinal:  Positive for abdominal pain. Negative for constipation, diarrhea, nausea and vomiting.   Genitourinary:  Negative for dysuria, flank pain, frequency, genital sores, hematuria, penile discharge, penile pain, penile swelling, scrotal swelling, testicular pain and urgency.   Skin:  Negative for color change.         Current Medications       Current Outpatient Medications:     ciprofloxacin (CIPRO) 500 mg tablet, Take 1 tablet (500 mg total) by mouth every 12 (twelve) hours for 10 days, Disp: 20 tablet, Rfl: 0    famotidine (PEPCID) 20 mg tablet, Take 1 tablet (20 mg total) by mouth 2 (two) times a day, Disp: 30 tablet, Rfl: 0    montelukast (SINGULAIR) 10 mg tablet, Take 1 tablet (10 mg total) by mouth daily at bedtime, Disp: 30 tablet, Rfl: 5    EPINEPHrine (EPIPEN) 0.3 mg/0.3 mL SOAJ, Inject 0.3 mL (0.3 mg total) into a muscle once for 1 dose, Disp: 0.6 mL, Rfl: 0    methylPREDNISolone 4 MG tablet therapy pack, Use as directed on package (Patient not taking: Reported on 12/28/2023), Disp: 1 each, Rfl: 0    Current Allergies     Allergies as of 12/28/2023 - Reviewed 12/28/2023   Allergen Reaction Noted    Bee venom Anaphylaxis 09/09/2014    Contrast  [iodinated contrast media] Rash 10/02/2012            The following portions of the patient's history were reviewed and updated as appropriate: allergies, current medications, past family history, past medical history, past social history, past surgical history and problem list.     Past Medical History:   Diagnosis Date    Bee allergy status     Last Assessed 8/25/2016    Benign lipomatous neoplasm     Last Assessed 7/7/2017    Cellulitis of mid back region     Last Assessed 11/13/2016    High risk medication use     Last Assessed 12/21/2015    Internal derangement of knee, left     Last Assessed 2/28/2017    Pelvic cramping     Last Assessed 8/25/2016    Prostatism     Last Assessed 8/25/2016    Sebaceous cyst     Last Assessed 7/01/2017  "      Past Surgical History:   Procedure Laterality Date    ARTHROSCOPY ELBOW Left 2003    ARTHROSCOPY KNEE Right     COLONOSCOPY  01/09/2013    normal    EGD  2009    regular Z line and biopsy negative for intestinal metaplasia by Dr. Avila    EGD  03/2012    intact fundoplication no abnormalities by Dr. Avila biopsy esophagus showed normal mucosa    HERNIA REPAIR      x 2    KNEE SURGERY Left     PARTIAL KNEE ARTHROPLASTY Left 12/05/2023    Dr [facility]    REPLACEMENT TOTAL KNEE BILATERAL      SHOULDER ARTHROSCOPY Left 2003    x2    SHOULDER ARTHROSCOPY Right 04/2012    x2    TONSILLECTOMY      WRIST SURGERY Left     Wrist hardware - Titanium plate       Family History   Problem Relation Age of Onset    Asthma Mother     Diabetes type II Maternal Grandfather          Medications have been verified.        Objective   /96   Pulse 89   Temp 97.7 °F (36.5 °C)   Resp 16   Ht 5' 10\" (1.778 m)   Wt 121 kg (267 lb)   SpO2 97%   BMI 38.31 kg/m²   No LMP for male patient.       Physical Exam     Physical Exam  Constitutional:       General: He is not in acute distress.     Appearance: He is well-developed. He is not diaphoretic.   Cardiovascular:      Rate and Rhythm: Normal rate and regular rhythm.      Heart sounds: No murmur heard.     No friction rub. No gallop.   Pulmonary:      Effort: Pulmonary effort is normal. No respiratory distress.      Breath sounds: Normal breath sounds. No wheezing, rhonchi or rales.   Abdominal:      General: Bowel sounds are normal. There is no distension.      Palpations: Abdomen is soft. There is no mass.      Tenderness: There is no abdominal tenderness. There is no guarding or rebound.      Comments:      Lymphadenopathy:      Cervical: No cervical adenopathy.   Skin:     General: Skin is warm.   Neurological:      Mental Status: He is alert.   Psychiatric:         Behavior: Behavior normal.         Thought Content: Thought content normal.         " Judgment: Judgment normal.

## 2023-12-29 LAB — BACTERIA UR CULT: NORMAL

## 2023-12-30 ENCOUNTER — APPOINTMENT (EMERGENCY)
Dept: CT IMAGING | Facility: HOSPITAL | Age: 58
End: 2023-12-30
Payer: COMMERCIAL

## 2023-12-30 ENCOUNTER — HOSPITAL ENCOUNTER (EMERGENCY)
Facility: HOSPITAL | Age: 58
Discharge: HOME | End: 2023-12-30
Attending: EMERGENCY MEDICINE
Payer: COMMERCIAL

## 2023-12-30 VITALS
HEART RATE: 91 BPM | OXYGEN SATURATION: 98 % | SYSTOLIC BLOOD PRESSURE: 128 MMHG | DIASTOLIC BLOOD PRESSURE: 95 MMHG | WEIGHT: 265 LBS | TEMPERATURE: 98 F | RESPIRATION RATE: 18 BRPM | BODY MASS INDEX: 37.94 KG/M2 | HEIGHT: 70 IN

## 2023-12-30 DIAGNOSIS — R10.30 LOWER ABDOMINAL PAIN: Primary | ICD-10-CM

## 2023-12-30 DIAGNOSIS — R30.0 DYSURIA: ICD-10-CM

## 2023-12-30 LAB
ALBUMIN SERPL BCP-MCNC: 3.9 G/DL (ref 3.5–5)
ALP SERPL-CCNC: 91 U/L (ref 34–104)
ALT SERPL W P-5'-P-CCNC: 17 U/L (ref 7–52)
ANION GAP SERPL CALCULATED.3IONS-SCNC: 8 MMOL/L
APTT PPP: 28 SECONDS (ref 23–37)
AST SERPL W P-5'-P-CCNC: 18 U/L (ref 13–39)
BACTERIA UR QL AUTO: NORMAL /HPF
BASOPHILS # BLD MANUAL: 0.11 THOUSAND/UL (ref 0–0.1)
BASOPHILS NFR MAR MANUAL: 1 % (ref 0–1)
BILIRUB SERPL-MCNC: 0.41 MG/DL (ref 0.2–1)
BILIRUB UR QL STRIP: NEGATIVE
BUN SERPL-MCNC: 22 MG/DL (ref 5–25)
CALCIUM SERPL-MCNC: 9.1 MG/DL (ref 8.4–10.2)
CHLORIDE SERPL-SCNC: 106 MMOL/L (ref 96–108)
CLARITY UR: CLEAR
CO2 SERPL-SCNC: 23 MMOL/L (ref 21–32)
COLOR UR: YELLOW
CREAT SERPL-MCNC: 0.95 MG/DL (ref 0.6–1.3)
EOSINOPHIL # BLD MANUAL: 0.21 THOUSAND/UL (ref 0–0.4)
EOSINOPHIL NFR BLD MANUAL: 2 % (ref 0–6)
ERYTHROCYTE [DISTWIDTH] IN BLOOD BY AUTOMATED COUNT: 13.3 % (ref 11.6–15.1)
FLUAV RNA RESP QL NAA+PROBE: NEGATIVE
FLUBV RNA RESP QL NAA+PROBE: NEGATIVE
GFR SERPL CREATININE-BSD FRML MDRD: 87 ML/MIN/1.73SQ M
GLUCOSE SERPL-MCNC: 127 MG/DL (ref 65–140)
GLUCOSE UR STRIP-MCNC: NEGATIVE MG/DL
HCT VFR BLD AUTO: 46.1 % (ref 36.5–49.3)
HGB BLD-MCNC: 14.9 G/DL (ref 12–17)
HGB UR QL STRIP.AUTO: ABNORMAL
INR PPP: 0.9 (ref 0.84–1.19)
KETONES UR STRIP-MCNC: NEGATIVE MG/DL
LACTATE SERPL-SCNC: 1.2 MMOL/L (ref 0.5–2)
LEUKOCYTE ESTERASE UR QL STRIP: NEGATIVE
LIPASE SERPL-CCNC: 53 U/L (ref 11–82)
LYMPHOCYTES # BLD AUTO: 2.44 THOUSAND/UL (ref 0.6–4.47)
LYMPHOCYTES # BLD AUTO: 21 % (ref 14–44)
MCH RBC QN AUTO: 30.3 PG (ref 26.8–34.3)
MCHC RBC AUTO-ENTMCNC: 32.3 G/DL (ref 31.4–37.4)
MCV RBC AUTO: 94 FL (ref 82–98)
METAMYELOCYTES NFR BLD MANUAL: 1 % (ref 0–1)
MONOCYTES # BLD AUTO: 0.85 THOUSAND/UL (ref 0–1.22)
MONOCYTES NFR BLD: 8 % (ref 4–12)
MYELOCYTES NFR BLD MANUAL: 1 % (ref 0–1)
NEUTROPHILS # BLD MANUAL: 6.79 THOUSAND/UL (ref 1.85–7.62)
NEUTS BAND NFR BLD MANUAL: 1 % (ref 0–8)
NEUTS SEG NFR BLD AUTO: 63 % (ref 43–75)
NITRITE UR QL STRIP: NEGATIVE
NON-SQ EPI CELLS URNS QL MICRO: NORMAL /HPF
PH UR STRIP.AUTO: 7 [PH]
PLATELET # BLD AUTO: 282 THOUSANDS/UL (ref 149–390)
PLATELET BLD QL SMEAR: ADEQUATE
PMV BLD AUTO: 10.4 FL (ref 8.9–12.7)
POTASSIUM SERPL-SCNC: 4.4 MMOL/L (ref 3.5–5.3)
PROT SERPL-MCNC: 6.9 G/DL (ref 6.4–8.4)
PROT UR STRIP-MCNC: NEGATIVE MG/DL
PROTHROMBIN TIME: 12.5 SECONDS (ref 11.6–14.5)
RBC # BLD AUTO: 4.91 MILLION/UL (ref 3.88–5.62)
RBC #/AREA URNS AUTO: NORMAL /HPF
RBC MORPH BLD: NORMAL
RSV RNA RESP QL NAA+PROBE: NEGATIVE
SARS-COV-2 RNA RESP QL NAA+PROBE: NEGATIVE
SODIUM SERPL-SCNC: 137 MMOL/L (ref 135–147)
SP GR UR STRIP.AUTO: 1.02 (ref 1–1.03)
UROBILINOGEN UR QL STRIP.AUTO: 0.2 E.U./DL
VARIANT LYMPHS # BLD AUTO: 2 %
WBC # BLD AUTO: 10.61 THOUSAND/UL (ref 4.31–10.16)
WBC #/AREA URNS AUTO: NORMAL /HPF

## 2023-12-30 PROCEDURE — 80053 COMPREHEN METABOLIC PANEL: CPT | Performed by: EMERGENCY MEDICINE

## 2023-12-30 PROCEDURE — 83605 ASSAY OF LACTIC ACID: CPT | Performed by: EMERGENCY MEDICINE

## 2023-12-30 PROCEDURE — 85730 THROMBOPLASTIN TIME PARTIAL: CPT | Performed by: EMERGENCY MEDICINE

## 2023-12-30 PROCEDURE — 36415 COLL VENOUS BLD VENIPUNCTURE: CPT | Performed by: EMERGENCY MEDICINE

## 2023-12-30 PROCEDURE — 96361 HYDRATE IV INFUSION ADD-ON: CPT

## 2023-12-30 PROCEDURE — 87040 BLOOD CULTURE FOR BACTERIA: CPT | Performed by: EMERGENCY MEDICINE

## 2023-12-30 PROCEDURE — 83690 ASSAY OF LIPASE: CPT | Performed by: EMERGENCY MEDICINE

## 2023-12-30 PROCEDURE — 85027 COMPLETE CBC AUTOMATED: CPT | Performed by: EMERGENCY MEDICINE

## 2023-12-30 PROCEDURE — 99284 EMERGENCY DEPT VISIT MOD MDM: CPT

## 2023-12-30 PROCEDURE — 81001 URINALYSIS AUTO W/SCOPE: CPT | Performed by: EMERGENCY MEDICINE

## 2023-12-30 PROCEDURE — 85610 PROTHROMBIN TIME: CPT | Performed by: EMERGENCY MEDICINE

## 2023-12-30 PROCEDURE — G1004 CDSM NDSC: HCPCS

## 2023-12-30 PROCEDURE — 99285 EMERGENCY DEPT VISIT HI MDM: CPT | Performed by: EMERGENCY MEDICINE

## 2023-12-30 PROCEDURE — 74176 CT ABD & PELVIS W/O CONTRAST: CPT

## 2023-12-30 PROCEDURE — 0241U HB NFCT DS VIR RESP RNA 4 TRGT: CPT | Performed by: EMERGENCY MEDICINE

## 2023-12-30 PROCEDURE — 85007 BL SMEAR W/DIFF WBC COUNT: CPT | Performed by: EMERGENCY MEDICINE

## 2023-12-30 PROCEDURE — 96374 THER/PROPH/DIAG INJ IV PUSH: CPT

## 2023-12-30 RX ORDER — SULFAMETHOXAZOLE AND TRIMETHOPRIM 800; 160 MG/1; MG/1
1 TABLET ORAL 2 TIMES DAILY
Qty: 20 TABLET | Refills: 0 | Status: SHIPPED | OUTPATIENT
Start: 2023-12-30 | End: 2024-01-09

## 2023-12-30 RX ORDER — SULFAMETHOXAZOLE AND TRIMETHOPRIM 800; 160 MG/1; MG/1
1 TABLET ORAL ONCE
Status: COMPLETED | OUTPATIENT
Start: 2023-12-30 | End: 2023-12-30

## 2023-12-30 RX ORDER — PHENAZOPYRIDINE HYDROCHLORIDE 200 MG/1
200 TABLET, FILM COATED ORAL 3 TIMES DAILY
Qty: 6 TABLET | Refills: 0 | Status: SHIPPED | OUTPATIENT
Start: 2023-12-30

## 2023-12-30 RX ORDER — KETOROLAC TROMETHAMINE 30 MG/ML
15 INJECTION, SOLUTION INTRAMUSCULAR; INTRAVENOUS ONCE
Status: COMPLETED | OUTPATIENT
Start: 2023-12-30 | End: 2023-12-30

## 2023-12-30 RX ORDER — PHENAZOPYRIDINE HYDROCHLORIDE 100 MG/1
100 TABLET, FILM COATED ORAL ONCE
Status: COMPLETED | OUTPATIENT
Start: 2023-12-30 | End: 2023-12-30

## 2023-12-30 RX ADMIN — KETOROLAC TROMETHAMINE 15 MG: 30 INJECTION, SOLUTION INTRAMUSCULAR at 18:14

## 2023-12-30 RX ADMIN — SULFAMETHOXAZOLE AND TRIMETHOPRIM 1 TABLET: 800; 160 TABLET ORAL at 19:25

## 2023-12-30 RX ADMIN — PHENAZOPYRIDINE 100 MG: 100 TABLET ORAL at 19:25

## 2023-12-30 RX ADMIN — SODIUM CHLORIDE 1000 ML: 0.9 INJECTION, SOLUTION INTRAVENOUS at 18:14

## 2023-12-30 NOTE — ED PROVIDER NOTES
"History  Chief Complaint   Patient presents with    Abdominal Pain     Pt endorses \" prostate pain \" unable to get into urology; was recently seen at urgent care for same     Patient with a past history of prostatitis.  Was seen in urgent care 2 days ago and placed on Cipro.  Urine culture shows no growth.  Continues with urinary frequency.  States he recently had the flu.  Continues to have body aches.  Subjective fevers.  No chills.  No cough or cold symptoms.  No shortness of breath.  Had knee replacement surgery 25 days ago.  States the knee is healing well.  Bowel movements have been normal.      History provided by:  Patient   used: No    Abdominal Pain  Pain location:  Suprapubic  Pain quality: aching and burning    Pain radiates to:  Does not radiate  Pain severity:  Mild  Onset quality:  Gradual  Duration:  4 days  Timing:  Constant  Progression:  Unchanged  Chronicity:  Recurrent  Context: not alcohol use, not diet changes, not laxative use and not suspicious food intake    Relieved by:  Nothing  Worsened by:  Nothing  Ineffective treatments: P.o. Cipro.  Associated symptoms: anorexia, fatigue, fever and nausea    Associated symptoms: no chest pain, no chills, no constipation, no cough, no diarrhea, no dysuria, no hematuria, no shortness of breath, no sore throat and no vomiting        Prior to Admission Medications   Prescriptions Last Dose Informant Patient Reported? Taking?   EPINEPHrine (EPIPEN) 0.3 mg/0.3 mL SOAJ   No No   Sig: Inject 0.3 mL (0.3 mg total) into a muscle once for 1 dose   ciprofloxacin (CIPRO) 500 mg tablet   No No   Sig: Take 1 tablet (500 mg total) by mouth every 12 (twelve) hours for 10 days   famotidine (PEPCID) 20 mg tablet   No No   Sig: Take 1 tablet (20 mg total) by mouth 2 (two) times a day   methylPREDNISolone 4 MG tablet therapy pack   No No   Sig: Use as directed on package   Patient not taking: Reported on 12/28/2023   montelukast (SINGULAIR) 10 mg " tablet   No No   Sig: Take 1 tablet (10 mg total) by mouth daily at bedtime      Facility-Administered Medications: None       Past Medical History:   Diagnosis Date    Bee allergy status     Last Assessed 8/25/2016    Benign lipomatous neoplasm     Last Assessed 7/7/2017    Cellulitis of mid back region     Last Assessed 11/13/2016    High risk medication use     Last Assessed 12/21/2015    Internal derangement of knee, left     Last Assessed 2/28/2017    Pelvic cramping     Last Assessed 8/25/2016    Prostatism     Last Assessed 8/25/2016    Sebaceous cyst     Last Assessed 7/01/2017       Past Surgical History:   Procedure Laterality Date    ARTHROSCOPY ELBOW Left 2003    ARTHROSCOPY KNEE Right     COLONOSCOPY  01/09/2013    normal    EGD  2009    regular Z line and biopsy negative for intestinal metaplasia by Dr. Avila    EGD  03/2012    intact fundoplication no abnormalities by Dr. Avila biopsy esophagus showed normal mucosa    HERNIA REPAIR      x 2    KNEE SURGERY Left     PARTIAL KNEE ARTHROPLASTY Left 12/05/2023    Dr Martinez [facility]    REPLACEMENT TOTAL KNEE BILATERAL      SHOULDER ARTHROSCOPY Left 2003    x2    SHOULDER ARTHROSCOPY Right 04/2012    x2    TONSILLECTOMY      WRIST SURGERY Left     Wrist hardware - Titanium plate       Family History   Problem Relation Age of Onset    Asthma Mother     Diabetes type II Maternal Grandfather      I have reviewed and agree with the history as documented.    E-Cigarette/Vaping    E-Cigarette Use Never User      E-Cigarette/Vaping Substances     Social History     Tobacco Use    Smoking status: Never    Smokeless tobacco: Former     Quit date: 2010   Vaping Use    Vaping status: Never Used   Substance Use Topics    Alcohol use: Yes     Comment: Occasionally beer    Drug use: No       Review of Systems   Constitutional:  Positive for fatigue and fever. Negative for chills.   HENT:  Negative for ear pain, hearing loss, sore throat, trouble swallowing and  voice change.    Eyes:  Negative for pain and discharge.   Respiratory:  Negative for cough, shortness of breath and wheezing.    Cardiovascular:  Negative for chest pain and palpitations.   Gastrointestinal:  Positive for abdominal pain, anorexia and nausea. Negative for blood in stool, constipation, diarrhea and vomiting.   Genitourinary:  Negative for dysuria, flank pain, frequency and hematuria.   Musculoskeletal:  Positive for myalgias. Negative for joint swelling, neck pain and neck stiffness.   Skin:  Negative for rash and wound.   Neurological:  Positive for weakness. Negative for dizziness, seizures, syncope, facial asymmetry and headaches.   Psychiatric/Behavioral:  Negative for hallucinations, self-injury and suicidal ideas.    All other systems reviewed and are negative.      Physical Exam  Physical Exam  Vitals and nursing note reviewed.   Constitutional:       General: He is not in acute distress.     Appearance: He is well-developed.   HENT:      Head: Normocephalic and atraumatic.      Right Ear: External ear normal.      Left Ear: External ear normal.   Eyes:      General: No scleral icterus.        Right eye: No discharge.         Left eye: No discharge.      Extraocular Movements: Extraocular movements intact.      Conjunctiva/sclera: Conjunctivae normal.   Cardiovascular:      Rate and Rhythm: Normal rate and regular rhythm.      Heart sounds: Normal heart sounds. No murmur heard.  Pulmonary:      Effort: Pulmonary effort is normal.      Breath sounds: Normal breath sounds. No wheezing or rales.   Abdominal:      General: Bowel sounds are normal. There is no distension.      Palpations: Abdomen is soft.      Tenderness: There is abdominal tenderness in the suprapubic area. There is no guarding or rebound.   Genitourinary:     Penis: Normal.       Testes: Normal.         Right: Mass, tenderness or swelling not present.         Left: Mass or tenderness not present.   Musculoskeletal:          General: No deformity. Normal range of motion.      Cervical back: Normal range of motion and neck supple.   Skin:     General: Skin is warm and dry.      Findings: No rash.      Comments: Left knee with full range of motion.  No pain with movement.  Healing surgical scar at the left knee.  There is a small amount of effusion.  Minimal warmth.  This is been unchanged per patient since the surgery.   Neurological:      General: No focal deficit present.      Mental Status: He is alert and oriented to person, place, and time.      Cranial Nerves: No cranial nerve deficit.   Psychiatric:         Mood and Affect: Mood normal.         Behavior: Behavior normal.         Thought Content: Thought content normal.         Judgment: Judgment normal.         Vital Signs  ED Triage Vitals [12/30/23 1752]   Temperature Pulse Respirations Blood Pressure SpO2   98 °F (36.7 °C) 91 18 128/95 98 %      Temp Source Heart Rate Source Patient Position - Orthostatic VS BP Location FiO2 (%)   Tympanic Monitor Sitting Right arm --      Pain Score       6           Vitals:    12/30/23 1752   BP: 128/95   Pulse: 91   Patient Position - Orthostatic VS: Sitting         Visual Acuity      ED Medications  Medications   sulfamethoxazole-trimethoprim (BACTRIM DS) 800-160 mg per tablet 1 tablet (has no administration in time range)   phenazopyridine (PYRIDIUM) tablet 100 mg (has no administration in time range)   sodium chloride 0.9 % bolus 1,000 mL (1,000 mL Intravenous New Bag 12/30/23 1814)   ketorolac (TORADOL) injection 15 mg (15 mg Intravenous Given 12/30/23 1814)       Diagnostic Studies  Results Reviewed       Procedure Component Value Units Date/Time    FLU/RSV/COVID - if FLU/RSV clinically relevant [[id number]]  (Normal) Collected: 12/30/23 1817    Lab Status: Final result Specimen: Nares from Nose Updated: 12/30/23 1916     SARS-CoV-2 Negative     INFLUENZA A PCR Negative     INFLUENZA B PCR Negative     RSV PCR Negative    Narrative:       FOR PEDIATRIC PATIENTS - copy/paste COVID Guidelines URL to browser: [email or web address]    SARS-CoV-2 assay is a Nucleic Acid Amplification assay intended for the  qualitative detection of nucleic acid from SARS-CoV-2 in nasopharyngeal  swabs. Results are for the presumptive identification of SARS-CoV-2 RNA.    Positive results are indicative of infection with SARS-CoV-2, the virus  causing COVID-19, but do not rule out bacterial infection or co-infection  with other viruses. Laboratories within the [place] and its  territories are required to report all positive results to the appropriate  public health authorities. Negative results do not preclude SARS-CoV-2  infection and should not be used as the sole basis for treatment or other  patient management decisions. Negative results must be combined with  clinical observations, patient history, and epidemiological information.  This test has not been FDA cleared or approved.    This test has been authorized by FDA under an Emergency Use Authorization  (EUA). This test is only authorized for the duration of time the  declaration that circumstances exist justifying the authorization of the  emergency use of an in vitro diagnostic tests for detection of SARS-CoV-2  virus and/or diagnosis of COVID-19 infection under section 564(b)(1) of  the Act, 21 U.S.C. 360bbb-3(b)(1), unless the authorization is terminated  or revoked sooner. The test has been validated but independent review by FDA  and CLIA is pending.    Test performed using [employer] GeneXpert: This RT-PCR assay targets N2,  a region unique to SARS-CoV-2. A conserved region in the E-gene was chosen  for pan-Sarbecovirus detection which includes SARS-CoV-2.    According to CMS-2020-01-R, this platform meets the definition of high-throughput technology.    UA w Reflex to Microscopic w Reflex to Culture [[id number]]  (Abnormal) Collected: 12/30/23 1660    Lab  Status: Final result Specimen: Urine, Clean Catch Updated: 12/30/23 1911     Color, UA Yellow     Clarity, UA Clear     Specific Gravity, UA 1.025     pH, UA 7.0     Leukocytes, UA Negative     Nitrite, UA Negative     Protein, UA Negative mg/dl      Glucose, UA Negative mg/dl      Ketones, UA Negative mg/dl      Urobilinogen, UA 0.2 E.U./dl      Bilirubin, UA Negative     Occult Blood, UA Trace-Intact    Urine Microscopic [[id number]] Collected: 12/30/23 1820    Lab Status: In process Specimen: Urine, Clean Catch Updated: 12/30/23 1911    Lactic acid, plasma (w/reflex if result > 2.0) [[id number]]  (Normal) Collected: 12/30/23 1807    Lab Status: Final result Specimen: Blood from Arm, Right Updated: 12/30/23 1840     LACTIC ACID 1.2 mmol/L     Narrative:      Result may be elevated if tourniquet was used during collection.    Comprehensive metabolic panel [[id number]] Collected: 12/30/23 1811    Lab Status: Final result Specimen: Blood Updated: 12/30/23 1839     Sodium 137 mmol/L      Potassium 4.4 mmol/L      Chloride 106 mmol/L      CO2 23 mmol/L      ANION GAP 8 mmol/L      BUN 22 mg/dL      Creatinine 0.95 mg/dL      Glucose 127 mg/dL      Calcium 9.1 mg/dL      AST 18 U/L      ALT 17 U/L      Alkaline Phosphatase 91 U/L      Total Protein 6.9 g/dL      Albumin 3.9 g/dL      Total Bilirubin 0.41 mg/dL      eGFR 87 ml/min/1.73sq m     Narrative:      National Kidney Disease Foundation guidelines for Chronic Kidney Disease (CKD):     Stage 1 with normal or high GFR (GFR > 90 mL/min/1.73 square meters)    Stage 2 Mild CKD (GFR = 60-89 mL/min/1.73 square meters)    Stage 3A Moderate CKD (GFR = 45-59 mL/min/1.73 square meters)    Stage 3B Moderate CKD (GFR = 30-44 mL/min/1.73 square meters)    Stage 4 Severe CKD (GFR = 15-29 mL/min/1.73 square meters)    Stage 5 End Stage CKD (GFR <15 mL/min/1.73 square meters)  Note: GFR calculation is accurate only with a steady state creatinine    Lipase [[id number]]  (Normal)  Collected: 12/30/23 1811    Lab Status: Final result Specimen: Blood Updated: 12/30/23 1839     Lipase 53 u/L     CBC and differential [[id number]]  (Abnormal) Collected: 12/30/23 1807    Lab Status: Final result Specimen: Blood from Arm, Right Updated: 12/30/23 1833     WBC 10.61 Thousand/uL      RBC 4.91 Million/uL      Hemoglobin 14.9 g/dL      Hematocrit 46.1 %      MCV 94 fL      MCH 30.3 pg      MCHC 32.3 g/dL      RDW 13.3 %      MPV 10.4 fL      Platelets 282 Thousands/uL     Protime-INR [[id number]]  (Normal) Collected: 12/30/23 1807    Lab Status: Final result Specimen: Blood from Arm, Right Updated: 12/30/23 1833     Protime 12.5 seconds      INR 0.90    APTT [[id number]]  (Normal) Collected: 12/30/23 1807    Lab Status: Final result Specimen: Blood from Arm, Right Updated: 12/30/23 1833     PTT 28 seconds     Manual Differential(PHLEBS Do Not Order) [[id number]] Collected: 12/30/23 1807    Lab Status: In process Specimen: Blood from Arm, Right Updated: 12/30/23 1833    Blood culture #1 [[id number]] Collected: 12/30/23 1825    Lab Status: In process Specimen: Blood from Arm, Left Updated: 12/30/23 1830    Blood culture #2 [[id number]] Collected: 12/30/23 1825    Lab Status: In process Specimen: Blood from Arm, Right Updated: 12/30/23 1830                   CT abdomen pelvis wo contrast   Final Result by Osvaldo Dueñas DO (12/30 1908)      1. No acute intra-abdominal abnormality.      2. Mild hepatomegaly with hepatic steatosis.      3. Prior abdominoplasty with small fat-containing umbilical and bilateral inguinal hernias.      Limited study without IV or oral contrast.      Workstation performed: [id number]                    Procedures  Procedures         ED Course  ED Course as of 12/30/23 1921   Sat Dec 30, 2023   1803 Patient with a low Wells score for PE.                               SBIRT 22yo+      Flowsheet Row Most Recent Value   Initial Alcohol Screen: US AUDIT-C     1. How often do you have  a drink containing alcohol? 0 Filed at: 12/30/2023 1752   2. How many drinks containing alcohol do you have on a typical day you are drinking?  0 Filed at: 12/30/2023 1752   3a. Male UNDER 65: How often do you have five or more drinks on one occasion? 0 Filed at: 12/30/2023 1752   3b. FEMALE Any Age, or MALE 65+: How often do you have 4 or more drinks on one occassion? 0 Filed at: 12/30/2023 1752   Audit-C Score 0 Filed at: 12/30/2023 1752   CHASE: How many times in the past year have you...    Used an illegal drug or used a prescription medication for non-medical reasons? Never Filed at: 12/30/2023 1752                      Medical Decision Making  Amount and/or Complexity of Data Reviewed  Labs: ordered. Decision-making details documented in ED Course.  Radiology: ordered. Decision-making details documented in ED Course.  Discussion of management or test interpretation with external provider(s): Differential diagnosis includes but not limited to cholelithiasis, cholecystitis, peptic ulcer disease, gastritis, pancreatitis, diverticulitis, colitis, bowel obstruction, appendicitis, UTI, ureteral stone, kidney stone, inflammatory bowel disease.    Risk  Prescription drug management.             Disposition  Final diagnoses:   Lower abdominal pain   Dysuria     Time reflects when diagnosis was documented in both MDM as applicable and the Disposition within this note       Time User Action Codes Description Comment    12/30/2023  7:18 PM John Dempsey Add [R10.30] Lower abdominal pain     12/30/2023  7:19 PM John Dempsey Add [R30.0] Dysuria           ED Disposition       ED Disposition   Discharge    Condition   Stable    Date/Time   Sat Dec 30, 2023 1918    Comment   Ancelmo Rosales Jr. discharge to home/self care.                   Follow-up Information       Follow up With Specialties Details Why Contact Info    Jed White MD Urology Call in 3 days  [address] [facility] [id number] [phone]               Patient's Medications   Discharge Prescriptions    PHENAZOPYRIDINE (PYRIDIUM) 200 MG TABLET    Take 1 tablet (200 mg total) by mouth 3 (three) times a day       Start Date: 12/30/2023End Date: --       Order Dose: 200 mg       Quantity: 6 tablet    Refills: 0    SULFAMETHOXAZOLE-TRIMETHOPRIM (BACTRIM DS) 800-160 MG PER TABLET    Take 1 tablet by mouth 2 (two) times a day for 10 days smx-tmp DS (BACTRIM) 800-160 mg tabs (1tab q12 D10)       Start Date: 12/30/2023End Date: 1/9/2024       Order Dose: 1 tablet       Quantity: 20 tablet    Refills: 0       No discharge procedures on file.    PDMP Review       None            ED Provider  Electronically Signed by             John Dempsey MD  12/30/23 2623

## 2024-01-02 ENCOUNTER — TELEPHONE (OUTPATIENT)
Dept: FAMILY MEDICINE CLINIC | Facility: CLINIC | Age: 59
End: 2024-01-02

## 2024-01-02 NOTE — TELEPHONE ENCOUNTER
01/02/24 3:29 PM    Patient contacted post ED visit, VBI phone outreaches documented. Patient called practice and scheduled a follow-up ED visit.     Thank you.  Zoya Ventura [facility]

## 2024-01-03 ENCOUNTER — APPOINTMENT (OUTPATIENT)
Dept: PHYSICAL THERAPY | Facility: CLINIC | Age: 59
End: 2024-01-03
Payer: COMMERCIAL

## 2024-01-04 LAB
BACTERIA BLD CULT: NORMAL
BACTERIA BLD CULT: NORMAL

## 2024-01-08 ENCOUNTER — OFFICE VISIT (OUTPATIENT)
Dept: PHYSICAL THERAPY | Facility: CLINIC | Age: 59
End: 2024-01-08
Payer: COMMERCIAL

## 2024-01-08 DIAGNOSIS — Z96.652 STATUS POST LEFT PARTIAL KNEE REPLACEMENT: Primary | ICD-10-CM

## 2024-01-08 DIAGNOSIS — M25.562 CHRONIC PAIN OF LEFT KNEE: ICD-10-CM

## 2024-01-08 DIAGNOSIS — G89.29 CHRONIC PAIN OF LEFT KNEE: ICD-10-CM

## 2024-01-08 PROCEDURE — 97110 THERAPEUTIC EXERCISES: CPT

## 2024-01-08 PROCEDURE — 97140 MANUAL THERAPY 1/> REGIONS: CPT

## 2024-01-08 PROCEDURE — 97112 NEUROMUSCULAR REEDUCATION: CPT

## 2024-01-08 NOTE — PROGRESS NOTES
"Daily Note     Today's date: 2024  Patient name: Ancelmo Rosales Jr.  : 1965  MRN: [id number]  Referring provider: Bere Martinez MD  Dx:   Encounter Diagnosis     ICD-10-CM    1. Status post left partial knee replacement  Z96.652       2. Chronic pain of left knee  M25.562     G89.29                      Subjective: Pt reports being sick recently and unable to attend PT.       Objective: See treatment diary below      Assessment: Tolerated treatment well. Patient exhibited good technique with therapeutic exercises. Pt making cont slow steady gains with program. Pt with good overall demonstration of strength and ROM. Pt c/o lateral and post L knee pain today.       Plan: Continue per plan of care.      Precautions: MD protocol for L Knee Partial Knee arthroscopy 23    Daily Treatment Diary    HEP: Handout provided and discussed      Manuals 23    ART        PROM/Stretch x15' x15' x15' 15'    IASTM        STM/Triggerpoint        JM                Neuro Re-Ed        Standing / Roll calf stretch 6x20'' 6x20'' 6x20'' \"    SL Ecc HR        SL Balance AE  This session      Tall Box distal HS Stretch  6x20'' 6x20'' \"                                     Ther Ex        HS into QS into SLR 2x10 2x10 2x10 3/10    HR/TR 2x10 3x10 3x10 3/10    TB TKE 2x10 L5 3x10 L5 3x10 L5 L5 3/10    TB Heel to Toes  This session 3x10 L4 L4 3/10    Sit to stand  This session      TRX Squats  2x10 3x10 3/10    Bridge with Add squeeze        SL Bridge   3x10 3/10    Clam shells        SL Sit to Stand        BOSU SLB        Upside down BOSU SL squat holds        TB Lat Walks  This session      Step ups/downs  This session 2x10 14'' 2/10 14\"                     Ther Activity        TM        Bike  x10' x10' 10'   ROM    NuStep        Forward/backward heel to toe walk  2x10 2x10             Gait Training                                        Modalities        MHP        CP x10' x10' x10'   "   US/Stim

## 2024-01-09 ENCOUNTER — OFFICE VISIT (OUTPATIENT)
Dept: UROLOGY | Facility: CLINIC | Age: 59
End: 2024-01-09
Payer: COMMERCIAL

## 2024-01-09 VITALS
DIASTOLIC BLOOD PRESSURE: 72 MMHG | BODY MASS INDEX: 38.74 KG/M2 | SYSTOLIC BLOOD PRESSURE: 118 MMHG | HEART RATE: 68 BPM | WEIGHT: 270 LBS

## 2024-01-09 DIAGNOSIS — N41.9 PROSTATITIS, UNSPECIFIED PROSTATITIS TYPE: ICD-10-CM

## 2024-01-09 DIAGNOSIS — Z12.5 PROSTATE CANCER SCREENING: Primary | ICD-10-CM

## 2024-01-09 PROCEDURE — 99213 OFFICE O/P EST LOW 20 MIN: CPT | Performed by: UROLOGY

## 2024-01-09 NOTE — PROGRESS NOTES
UROLOGY PROGRESS NOTE   [facility]  [address]  [address]  [place] [address]  [phone]  Fax:[phone]  [email or web address]      NAME: Ancelmo Rosales Jr.  AGE: 58 y.o. SEX: male  : 1965   MRN: [id number]    DATE: 2024  TIME: 4:52 PM    Assessment and Plan     1. Prostate cancer screening  -     PSA, Total Screen; Future; Expected date: 2025    2. Prostatitis, unspecified prostatitis type      Pelvic floor dysfunction and possible recent prostatitis.  A request for a PSA blood test has been initiated. The patient was instructed to abstain from sexual intercourse for 2 days prior to the test.   I will send a note to Dr. Castro Richard. The patient will inform us if the symptoms recur, at which point we may consider another course of Bactrim.   Additionally, physical therapy may be considered if symptom recurrence becomes a persistent issue.    Follow-up  The patient will follow up as needed.    Chief Complaint   No chief complaint on file.      History of Present Illness   New patient office visit: 58-year-old man last seen by me 2018 for microhematuria and underwent a negative workup including cystoscopy.  He had pelvic pain which was hypertonicity of his membranous sphincter and I thought this was the source of his pain with intercourse and urination.  At that time he wished to look on his own and see for resources on the Internet see what exercises were available.  I also offered him injections into his left groin for genitofemoral nerve block with bupivacaine and Solu-Cortef, which I am no longer using in terms of the Solu-Cortef.  We did cystoscopy he was extremely tender at the membranous urethra where the sphincter is and it was very tight.  He was seen in the emergency department for abdominal pain what he described as prostate pain 2023 and urinalysis was negative except for trace blood which he normally has.  CT scan was  unremarkable except for bilateral inguinal hernias and small fat-containing umbilical hernia.  Urine culture from December 20, 2023 was negative and his dipstick on that day was negative as well.    Ancelmo Rosales is a 58-year-old male who presents for evaluation of pain with intercourse and urination.    He was seen in urgent care due to abdominal pain and spasms. Currently, the patient reports feeling well. He experiences osteitis pubis and occasional discomfort during sexual intercourse. Nevertheless, he expressed significant pain and discomfort when seated.    He denies any left groin pain.     He underwent a partial knee replacement on 12/05/2023.    He contracted influenza, which resulted in urticaria. He experienced an allergic reaction to analgesics. The patient was unfit for 2 weeks due to illness. He visited the ER on 12/30/2023 and was prescribed Bactrim, with 3 tablets remaining.     His micturition is normal.     He is currently undergoing physical therapy for his leg.     He also reports occasional episodes of diarrhea.     The following portions of the patient's history were reviewed and updated as appropriate: allergies, current medications, past family history, past medical history, past social history, past surgical history and problem list.  Past Medical History:   Diagnosis Date    Bee allergy status     Last Assessed 8/25/2016    Benign lipomatous neoplasm     Last Assessed 7/7/2017    Cellulitis of mid back region     Last Assessed 11/13/2016    High risk medication use     Last Assessed 12/21/2015    Internal derangement of knee, left     Last Assessed 2/28/2017    Pelvic cramping     Last Assessed 8/25/2016    Prostatism     Last Assessed 8/25/2016    Sebaceous cyst     Last Assessed 7/01/2017     Past Surgical History:   Procedure Laterality Date    ARTHROSCOPY ELBOW Left 2003    ARTHROSCOPY KNEE Right     COLONOSCOPY  01/09/2013    normal    EGD  2009    regular Z line and biopsy negative  for intestinal metaplasia by Dr. Avila    EGD  03/2012    intact fundoplication no abnormalities by Dr. Avila biopsy esophagus showed normal mucosa    HERNIA REPAIR      x 2    KNEE SURGERY Left     PARTIAL KNEE ARTHROPLASTY Left 12/05/2023    Dr Martinez [facility]    REPLACEMENT TOTAL KNEE BILATERAL      SHOULDER ARTHROSCOPY Left 2003    x2    SHOULDER ARTHROSCOPY Right 04/2012    x2    TONSILLECTOMY      WRIST SURGERY Left     Wrist hardware - Titanium plate         Active Problem List     Patient Active Problem List   Diagnosis    Chondromalacia    Kienbock's disease of adults    Knee pain    Osteoarthritis    Osteoarthritis of hip    Osteoarthritis of knee    Tenosynovitis    Shoulder joint pain    Pain in elbow    Injury of wrist    Hip pain    Enthesopathy of wrist    Disorder of wrist joint    Disorder of shoulder    Tear of lateral cartilage or meniscus of knee, current    Disorder of bursae of shoulder region    Articular cartilage disorder of wrist       Objective   /72   Pulse 68   Wt 122 kg (270 lb)   BMI 38.74 kg/m²   Physical exam: Awake alert and oriented.  In no apparent distress.  HEENT atraumatic normocephalic extraocular wounds are intact respiratory effort is normal extremities no cyanosis or edema, normal range of motion.  I have personally reviewed results with the patient.    His urinalysis shows chronic benign microscopic hematuria.    His last PSA was from 08/20/2018 and it shows 0.7 ng/ml.    His CT scan shows he has a small hernia and underwent herniorrhaphy.    Current Medications     Current Outpatient Medications:     famotidine (PEPCID) 20 mg tablet, Take 1 tablet (20 mg total) by mouth 2 (two) times a day, Disp: 30 tablet, Rfl: 0    montelukast (SINGULAIR) 10 mg tablet, Take 1 tablet (10 mg total) by mouth daily at bedtime, Disp: 30 tablet, Rfl: 5    sulfamethoxazole-trimethoprim (BACTRIM DS) 800-160 mg per tablet, Take 1 tablet by mouth 2 (two) times a day for 10  days smx-tmp DS (BACTRIM) 800-160 mg tabs (1tab q12 D10), Disp: 20 tablet, Rfl: 0    EPINEPHrine (EPIPEN) 0.3 mg/0.3 mL SOAJ, Inject 0.3 mL (0.3 mg total) into a muscle once for 1 dose, Disp: 0.6 mL, Rfl: 0        Charlton Reyes      Transcribed for Edi Banerjee MD, by Grace Trinidad on 01/09/24 at 4:52 PM. Powered by Dragon Ambient e[employer].

## 2024-01-09 NOTE — LETTER
January 10, 2024     Castro Richard, DO  [address] [address]    Patient: Ancelmo Rosales Jr.   YOB: 1965   Date of Visit: 2024       Dear Dr. Richard:    Thank you for referring Ancelmo Rosales to me for evaluation. Below are my notes for this consultation.    If you have questions, please do not hesitate to call me. I look forward to following your patient along with you.         Sincerely,        Edi Banerjee MD        CC: No Recipients    Howard Reyes  2024  4:55 PM  Signed  UROLOGY PROGRESS NOTE   [facility]  [address] [place] [address]  [address]  [place] [address]  [phone]  Fax:[phone]  [email or web address]      NAME: Ancelmo Rosales Jr.  AGE: 58 y.o. SEX: male  : 1965   MRN: [id number]    DATE: 2024  TIME: 4:52 PM    Assessment and Plan     1. Prostate cancer screening  -     PSA, Total Screen; Future; Expected date: 2025    2. Prostatitis, unspecified prostatitis type      Pelvic floor dysfunction and possible recent prostatitis.  A request for a PSA blood test has been initiated. The patient was instructed to abstain from sexual intercourse for 2 days prior to the test.   I will send a note to Dr. Castro Richard. The patient will inform us if the symptoms recur, at which point we may consider another course of Bactrim.   Additionally, physical therapy may be considered if symptom recurrence becomes a persistent issue.    Follow-up  The patient will follow up as needed.    Chief Complaint   No chief complaint on file.      History of Present Illness   New patient office visit: 58-year-old man last seen by me 2018 for microhematuria and underwent a negative workup including cystoscopy.  He had pelvic pain which was hypertonicity of his membranous sphincter and I thought this was the source of his pain with intercourse and urination.  At that time he wished to look on his own and see for resources on the Internet  see what exercises were available.  I also offered him injections into his left groin for genitofemoral nerve block with bupivacaine and Solu-Cortef, which I am no longer using in terms of the Solu-Cortef.  We did cystoscopy he was extremely tender at the membranous urethra where the sphincter is and it was very tight.  He was seen in the emergency department for abdominal pain what he described as prostate pain December 30, 2023 and urinalysis was negative except for trace blood which he normally has.  CT scan was unremarkable except for bilateral inguinal hernias and small fat-containing umbilical hernia.  Urine culture from December 20, 2023 was negative and his dipstick on that day was negative as well.    Ancelmo Rosales is a 58-year-old male who presents for evaluation of pain with intercourse and urination.    He was seen in urgent care due to abdominal pain and spasms. Currently, the patient reports feeling well. He experiences osteitis pubis and occasional discomfort during sexual intercourse. Nevertheless, he expressed significant pain and discomfort when seated.    He denies any left groin pain.     He underwent a partial knee replacement on 12/05/2023.    He contracted influenza, which resulted in urticaria. He experienced an allergic reaction to analgesics. The patient was unfit for 2 weeks due to illness. He visited the ER on 12/30/2023 and was prescribed Bactrim, with 3 tablets remaining.     His micturition is normal.     He is currently undergoing physical therapy for his leg.     He also reports occasional episodes of diarrhea.     The following portions of the patient's history were reviewed and updated as appropriate: allergies, current medications, past family history, past medical history, past social history, past surgical history and problem list.  Past Medical History:   Diagnosis Date   • Bee allergy status     Last Assessed 8/25/2016   • Benign lipomatous neoplasm     Last Assessed  7/7/2017   • Cellulitis of mid back region     Last Assessed 11/13/2016   • High risk medication use     Last Assessed 12/21/2015   • Internal derangement of knee, left     Last Assessed 2/28/2017   • Pelvic cramping     Last Assessed 8/25/2016   • Prostatism     Last Assessed 8/25/2016   • Sebaceous cyst     Last Assessed 7/01/2017     Past Surgical History:   Procedure Laterality Date   • ARTHROSCOPY ELBOW Left 2003   • ARTHROSCOPY KNEE Right    • COLONOSCOPY  01/09/2013    normal   • EGD  2009    regular Z line and biopsy negative for intestinal metaplasia by Dr. Avila   • EGD  03/2012    intact fundoplication no abnormalities by Dr. Avila biopsy esophagus showed normal mucosa   • HERNIA REPAIR      x 2   • KNEE SURGERY Left    • PARTIAL [facility]   • REPLACEMENT TOTAL KNEE BILATERAL     • SHOULDER ARTHROSCOPY Left 2003    x2   • SHOULDER ARTHROSCOPY Right 04/2012    x2   • TONSILLECTOMY     • WRIST SURGERY Left     Wrist hardware - Titanium plate         Active Problem List     Patient Active Problem List   Diagnosis   • Chondromalacia   • Kienbock's disease of adults   • Knee pain   • Osteoarthritis   • Osteoarthritis of hip   • Osteoarthritis of knee   • Tenosynovitis   • Shoulder joint pain   • Pain in elbow   • Injury of wrist   • Hip pain   • Enthesopathy of wrist   • Disorder of wrist joint   • Disorder of shoulder   • Tear of lateral cartilage or meniscus of knee, current   • Disorder of bursae of shoulder region   • Articular cartilage disorder of wrist       Objective   /72   Pulse 68   Wt 122 kg (270 lb)   BMI 38.74 kg/m²   Physical exam: Awake alert and oriented.  In no apparent distress.  HEENT atraumatic normocephalic extraocular wounds are intact respiratory effort is normal extremities no cyanosis or edema, normal range of motion.  I have personally reviewed results with the patient.    His urinalysis shows chronic benign  microscopic hematuria.    His last PSA was from 08/20/2018 and it shows 0.7 ng/ml.    His CT scan shows he has a small hernia and underwent herniorrhaphy.    Current Medications     Current Outpatient Medications:   •  famotidine (PEPCID) 20 mg tablet, Take 1 tablet (20 mg total) by mouth 2 (two) times a day, Disp: 30 tablet, Rfl: 0  •  montelukast (SINGULAIR) 10 mg tablet, Take 1 tablet (10 mg total) by mouth daily at bedtime, Disp: 30 tablet, Rfl: 5  •  sulfamethoxazole-trimethoprim (BACTRIM DS) 800-160 mg per tablet, Take 1 tablet by mouth 2 (two) times a day for 10 days smx-tmp DS (BACTRIM) 800-160 mg tabs (1tab q12 D10), Disp: 20 tablet, Rfl: 0  •  EPINEPHrine (EPIPEN) 0.3 mg/0.3 mL SOAJ, Inject 0.3 mL (0.3 mg total) into a muscle once for 1 dose, Disp: 0.6 mL, Rfl: 0        Charlton Reyes      Transcribed for Edi Banerjee MD, by Grace Trinidad on 01/09/24 at 4:52 PM. Powered by Dragon Ambient e[employer].

## 2024-01-09 NOTE — PATIENT INSTRUCTIONS
KHOA if you have these symptoms again and we will try another course of Bactrim. Also, PT may be an option if this recurs.   Subsequent Stages Histo Method Verbiage: Using a similar technique to that described above, a thin layer of tissue was removed from all areas where tumor was visible on the previous stage.  The tissue was again oriented, mapped, dyed, and processed as above.

## 2024-01-10 ENCOUNTER — OFFICE VISIT (OUTPATIENT)
Dept: PHYSICAL THERAPY | Facility: CLINIC | Age: 59
End: 2024-01-10
Payer: COMMERCIAL

## 2024-01-10 DIAGNOSIS — G89.29 CHRONIC PAIN OF LEFT KNEE: ICD-10-CM

## 2024-01-10 DIAGNOSIS — M25.562 CHRONIC PAIN OF LEFT KNEE: ICD-10-CM

## 2024-01-10 DIAGNOSIS — Z96.652 STATUS POST LEFT PARTIAL KNEE REPLACEMENT: Primary | ICD-10-CM

## 2024-01-10 PROCEDURE — 97110 THERAPEUTIC EXERCISES: CPT | Performed by: PHYSICAL THERAPIST

## 2024-01-10 PROCEDURE — 97530 THERAPEUTIC ACTIVITIES: CPT | Performed by: PHYSICAL THERAPIST

## 2024-01-10 PROCEDURE — 97112 NEUROMUSCULAR REEDUCATION: CPT | Performed by: PHYSICAL THERAPIST

## 2024-01-10 PROCEDURE — 97140 MANUAL THERAPY 1/> REGIONS: CPT | Performed by: PHYSICAL THERAPIST

## 2024-01-10 NOTE — PROGRESS NOTES
"Daily Note     Today's date: 1/10/2024  Patient name: Ancelmo Rosales Jr.  : 1965  MRN: [id number]  Referring provider: Bere Martinez MD  Dx:   Encounter Diagnosis     ICD-10-CM    1. Status post left partial knee replacement  Z96.652       2. Chronic pain of left knee  M25.562     G89.29                      Subjective: Pt reports HEP going well. Incision looks good. No setbacks. Anxious to continue making progress.      Objective: See treatment diary below      Assessment: Tolerated treatment well. Patient demonstrated fatigue post treatment, exhibited good technique with therapeutic exercises, and would benefit from continued PT      Plan: Continue per plan of care.  Progress treatment as tolerated.         Precautions: MD protocol for L Knee Partial Knee arthroscopy 23    Daily Treatment Diary    HEP: Handout provided and discussed      Manuals 12/11/23 12/13/23 12/18/23 1/8/24 1/10/24   ART        PROM/Stretch x15' x15' x15' 15' x15'   IASTM        STM/Triggerpoint        JM                Neuro Re-Ed        Standing  Roll calf stretch 6x20'' 6x20'' 6x20'' \" 6x20''   SL Ecc HR        SL Balance AE  This session      Tall Box distal HS Stretch  6x20'' 6x20'' \"  6x20''                                   Ther Ex        HS into QS into SLR 2x10 2x10 2x10 3/10 3x10   HR/TR 2x10 3x10 3x10 3/10 3x10   TB TKE 2x10 L5 3x10 L5 3x10 L5 L5 3/10 3x10 L5   TB Heel to Toes  This session 3x10 L4 L4 3/10 3x10 L4   Sit to stand  This session      TRX Squats  2x10 3x10 3/10 3x10   Bridge with Add squeeze        SL Bridge   3x10 3/10 3x10   Clam shells        SL Sit to Stand        BOSU SLB        Stool scoots     5x25'   TB Lat Walks  This session      Step ups/downs  This session 2x10 14'' 2/10 14\"  2x10 14''                   Ther Activity        TM        Bike  x10' x10' 10'   ROM x10'   NuStep        Forward/backward heel to toe walk  2x10 2x10             Gait Training                                 "        Modalities        MHP        CP x10' x10' x10'     US/Stim

## 2024-01-12 NOTE — PROGRESS NOTES
PT Discharge    Today's date: 2024  Patient name: Ancelmo Rosales Jr.  : 1965  MRN: [id number]  Referring provider: Bere Martinez MD  Dx:   Encounter Diagnosis     ICD-10-CM    1. Status post left partial knee replacement  Z96.652       2. Chronic pain of left knee  M25.562     G89.29           Start Time: 0855  Stop Time: 1005  Total time in clinic (min): 70 minutes      Goals  STGs: To be complete within 4 weeks (met)  - Decrease pain to < 2/10 at worst  - Increase AROM to WNL  - Increase strength to > 4+/5  - Improve gait to WNL for distances < 6 blocks    LTGs: To be complete within 6 weeks (met)  - Able to walk for any extended amount of time/distance without pain or limitation for increased safety and functional capacity with ADLs and work-related duty  - Able to repetitively squat without pain or limitation for increased safety and functional capacity with ADLs and work-related duty  - Able to repetitively ascend/descend a full flight of stairs without pain or limitation for increased safety and functional capacity with ADLs and work-related duty  - Able to repetitively complete transfers without pain or limitation for increased safety and functional capacity with ADLs and work-related duty  - Able to keel for any extended amount of time without pain or limitation for increased safety and functional capacity with ADLs and work-related duty       Pt will be D/C from physical therapy as he has achieved all personal and functional goals.        Subjective Evaluation    Pain  At best pain ratin  At worst pain ratin  Location: R Knee         Pt reports he and his surgeon are in agreement goals are met and patient no longer requires physical therapy at this time.        Objective Pain level ranges 0-2/10  AROM: L Knee 0-125 degrees; R Knee 0-125 degrees  Strength: L quad and hamstrings 4+/5; R Quad and HS 5/5  Gait: WNL  Swelling: Mild (will continue to monitor)  Incisions: Clean and healing  well (no present signs/sx of infection. Will continue to monitor)  Isa's sign: (-) (patient instructed on signs and sx to watch for and to get to ED ASAP if noticing signs/sx)  FOTO: 99; GOAL: 70  Able to walk without pain and limitation  Able to squat without pain and limitation  Able complete transfers without pain and limitation  Able to ascend/descend stairs without pain and limitation  Able to kneel without pain and limitation

## 2024-01-15 ENCOUNTER — APPOINTMENT (OUTPATIENT)
Dept: PHYSICAL THERAPY | Facility: CLINIC | Age: 59
End: 2024-01-15
Payer: COMMERCIAL

## 2024-01-17 ENCOUNTER — APPOINTMENT (OUTPATIENT)
Dept: PHYSICAL THERAPY | Facility: CLINIC | Age: 59
End: 2024-01-17
Payer: COMMERCIAL

## 2024-01-22 ENCOUNTER — APPOINTMENT (OUTPATIENT)
Dept: PHYSICAL THERAPY | Facility: CLINIC | Age: 59
End: 2024-01-22
Payer: COMMERCIAL

## 2024-01-24 ENCOUNTER — APPOINTMENT (OUTPATIENT)
Dept: PHYSICAL THERAPY | Facility: CLINIC | Age: 59
End: 2024-01-24
Payer: COMMERCIAL

## 2024-01-29 ENCOUNTER — APPOINTMENT (OUTPATIENT)
Dept: PHYSICAL THERAPY | Facility: CLINIC | Age: 59
End: 2024-01-29
Payer: COMMERCIAL

## 2024-01-31 ENCOUNTER — APPOINTMENT (OUTPATIENT)
Dept: PHYSICAL THERAPY | Facility: CLINIC | Age: 59
End: 2024-01-31
Payer: COMMERCIAL

## 2024-02-08 ENCOUNTER — TELEPHONE (OUTPATIENT)
Age: 59
End: 2024-02-08

## 2024-02-08 DIAGNOSIS — N41.9 PROSTATITIS, UNSPECIFIED PROSTATITIS TYPE: Primary | ICD-10-CM

## 2024-02-08 NOTE — TELEPHONE ENCOUNTER
Patient called the RX Refill Line. Message is being forwarded to the office.     Patient is requesting Bactrim. Was told if he was having symptoms to call and Dr. Banerjee would prescribe it for him. In [place] right now.     Please contact patient at [phone] to let him know that it was sent to the [facility] in [place].

## 2024-02-08 NOTE — TELEPHONE ENCOUNTER
Called and left voicemail for patient to return call to discuss his current symptoms and determine if symptoms are similar to possible prostatitis that patient experienced last month. Also need to determine exactly which [facility] in [place] medication would need to be sent to.

## 2024-02-09 RX ORDER — SULFAMETHOXAZOLE AND TRIMETHOPRIM 800; 160 MG/1; MG/1
1 TABLET ORAL EVERY 12 HOURS SCHEDULED
Qty: 20 TABLET | Refills: 0 | Status: SHIPPED | OUTPATIENT
Start: 2024-02-09 | End: 2024-02-19

## 2024-02-09 NOTE — TELEPHONE ENCOUNTER
Returned call to Fran and made him aware script for Bactrim was sent to his [facility] in [place].

## 2024-02-09 NOTE — TELEPHONE ENCOUNTER
Called and spoke with the patient. Fran describes his symptoms as rectal tightness, pain and pressure in the rectum.  No dysuria or blood. No fever/chills.    Patient seen at Urgent Care end of December 2023 with the same symptoms.  Urgent Care prescribed Bactrim and Pyridium.  Patient reports he was feeling better in 48 hours.    Patient requesting another course of the Bactrim.  [facility] pharmacy in [place] listed in patient's chart.

## 2024-02-21 ENCOUNTER — RA CDI HCC (OUTPATIENT)
Dept: OTHER | Facility: HOSPITAL | Age: 59
End: 2024-02-21

## 2024-02-21 NOTE — PROGRESS NOTES
HCC coding opportunities          Chart Reviewed number of suggestions sent to Provider: 1  E66.01     Patients Insurance        Commercial Insurance: [employer]

## 2024-02-26 ENCOUNTER — TELEPHONE (OUTPATIENT)
Dept: FAMILY MEDICINE CLINIC | Facility: CLINIC | Age: 59
End: 2024-02-26

## 2024-02-26 NOTE — TELEPHONE ENCOUNTER
Patient has appt on 3/6/24. He is asking for routine blood work to be ordered prior to appt. Thank you.

## 2024-02-27 DIAGNOSIS — N40.0 PROSTATE HYPERTROPHY: ICD-10-CM

## 2024-02-27 DIAGNOSIS — E66.01 MORBID OBESITY (HCC): ICD-10-CM

## 2024-02-27 DIAGNOSIS — R53.82 CHRONIC FATIGUE: Primary | ICD-10-CM

## 2024-02-27 DIAGNOSIS — E78.2 MIXED HYPERLIPIDEMIA: ICD-10-CM

## 2024-03-02 ENCOUNTER — LAB (OUTPATIENT)
Dept: LAB | Facility: MEDICAL CENTER | Age: 59
End: 2024-03-02
Payer: COMMERCIAL

## 2024-03-02 DIAGNOSIS — Z12.5 PROSTATE CANCER SCREENING: ICD-10-CM

## 2024-03-02 DIAGNOSIS — E66.01 MORBID OBESITY (HCC): ICD-10-CM

## 2024-03-02 DIAGNOSIS — N40.0 PROSTATE HYPERTROPHY: Primary | ICD-10-CM

## 2024-03-02 LAB
ALBUMIN SERPL BCP-MCNC: 4.2 G/DL (ref 3.5–5)
ALP SERPL-CCNC: 83 U/L (ref 34–104)
ALT SERPL W P-5'-P-CCNC: 21 U/L (ref 7–52)
ANION GAP SERPL CALCULATED.3IONS-SCNC: 7 MMOL/L
AST SERPL W P-5'-P-CCNC: 19 U/L (ref 13–39)
BILIRUB SERPL-MCNC: 0.68 MG/DL (ref 0.2–1)
BUN SERPL-MCNC: 15 MG/DL (ref 5–25)
CALCIUM SERPL-MCNC: 9.2 MG/DL (ref 8.4–10.2)
CHLORIDE SERPL-SCNC: 103 MMOL/L (ref 96–108)
CHOLEST SERPL-MCNC: 195 MG/DL
CO2 SERPL-SCNC: 26 MMOL/L (ref 21–32)
CREAT SERPL-MCNC: 0.76 MG/DL (ref 0.6–1.3)
GFR SERPL CREATININE-BSD FRML MDRD: 100 ML/MIN/1.73SQ M
GLUCOSE P FAST SERPL-MCNC: 107 MG/DL (ref 65–99)
HDLC SERPL-MCNC: 49 MG/DL
LDLC SERPL CALC-MCNC: 122 MG/DL (ref 0–100)
NONHDLC SERPL-MCNC: 146 MG/DL
POTASSIUM SERPL-SCNC: 4.3 MMOL/L (ref 3.5–5.3)
PROT SERPL-MCNC: 7.2 G/DL (ref 6.4–8.4)
SODIUM SERPL-SCNC: 136 MMOL/L (ref 135–147)
TRIGL SERPL-MCNC: 120 MG/DL
TSH SERPL DL<=0.05 MIU/L-ACNC: 2.41 UIU/ML (ref 0.45–4.5)

## 2024-03-02 PROCEDURE — 80053 COMPREHEN METABOLIC PANEL: CPT | Performed by: FAMILY MEDICINE

## 2024-03-02 PROCEDURE — 84443 ASSAY THYROID STIM HORMONE: CPT

## 2024-03-02 PROCEDURE — 80061 LIPID PANEL: CPT | Performed by: FAMILY MEDICINE

## 2024-03-02 PROCEDURE — 36415 COLL VENOUS BLD VENIPUNCTURE: CPT

## 2024-03-02 PROCEDURE — 84153 ASSAY OF PSA TOTAL: CPT

## 2024-03-02 PROCEDURE — 84154 ASSAY OF PSA FREE: CPT

## 2024-03-04 LAB
PSA FREE MFR SERPL: 11.4 %
PSA FREE SERPL-MCNC: 0.16 NG/ML
PSA SERPL-MCNC: 1.4 NG/ML (ref 0–4)

## 2024-03-04 NOTE — RESULT ENCOUNTER NOTE
Please call the patient regarding his abnormal result.  Total cholesterol is good LDL cholesterol is still elevated at 122 if he is already watching his diet we need to start him on a statin

## 2024-03-06 ENCOUNTER — OFFICE VISIT (OUTPATIENT)
Dept: FAMILY MEDICINE CLINIC | Facility: CLINIC | Age: 59
End: 2024-03-06
Payer: COMMERCIAL

## 2024-03-06 VITALS
TEMPERATURE: 98.1 F | WEIGHT: 282 LBS | SYSTOLIC BLOOD PRESSURE: 126 MMHG | HEIGHT: 70 IN | OXYGEN SATURATION: 97 % | BODY MASS INDEX: 40.37 KG/M2 | DIASTOLIC BLOOD PRESSURE: 86 MMHG | HEART RATE: 84 BPM

## 2024-03-06 DIAGNOSIS — E66.01 MORBID OBESITY (HCC): ICD-10-CM

## 2024-03-06 DIAGNOSIS — S73.192A TEAR OF LEFT ACETABULAR LABRUM, INITIAL ENCOUNTER: ICD-10-CM

## 2024-03-06 DIAGNOSIS — M25.552 LEFT HIP PAIN: Primary | ICD-10-CM

## 2024-03-06 PROCEDURE — 99214 OFFICE O/P EST MOD 30 MIN: CPT | Performed by: FAMILY MEDICINE

## 2024-03-06 RX ORDER — TOPIRAMATE 50 MG/1
TABLET, FILM COATED ORAL
COMMUNITY
Start: 2024-01-29

## 2024-03-06 NOTE — PROGRESS NOTES
Assessment/Plan:    Problem List Items Addressed This Visit    None  Visit Diagnoses       Left hip pain    -  Primary    Tear of left acetabular labrum, initial encounter                 Diagnoses and all orders for this visit:    Left hip pain    Tear of left acetabular labrum, initial encounter    Other orders  -     topiramate (TOPAMAX) 50 MG tablet;  (Patient not taking: Reported on 3/6/2024)        No problem-specific Assessment & Plan notes found for this encounter.        Subjective:      Patient ID: Ancelmo Rosales Jr. is a 58 y.o. male.    HPI    The following portions of the patient's history were reviewed and updated as appropriate:   He has a past medical history of Bee allergy status, Benign lipomatous neoplasm, Cellulitis of mid back region, High risk medication use, Internal derangement of knee, left, Pelvic cramping, Prostatism, and Sebaceous cyst.,  does not have any pertinent problems on file.,   has a past surgical history that includes ARTHROSCOPY ELBOW (Left, 2003); ARTHROSCOPY KNEE (Right); Shoulder arthroscopy (Left, 2003); Shoulder arthroscopy (Right, 04/2012); TONSILLECTOMY; Wrist surgery (Left); Knee surgery (Left); Hernia repair; Colonoscopy (01/09/2013); EGD (2009); EGD (03/2012); Replacement total knee bilateral; and Partial knee arthroplasty (Left, 12/05/2023).,  family history includes Asthma in his mother; Diabetes type II in his maternal grandfather.,   reports that he has never smoked. He quit smokeless tobacco use about 14 years ago. He reports current alcohol use. He reports that he does not use drugs.,  is allergic to bee venom and contrast  [iodinated contrast media]..  Current Outpatient Medications   Medication Sig Dispense Refill   • EPINEPHrine (EPIPEN) 0.3 mg/0.3 mL SOAJ Inject 0.3 mL (0.3 mg total) into a muscle once for 1 dose (Patient not taking: Reported on 3/6/2024) 0.6 mL 0   • famotidine (PEPCID) 20 mg tablet Take 1 tablet (20 mg total) by mouth 2 (two) times a  "day (Patient not taking: Reported on 3/6/2024) 30 tablet 0   • montelukast (SINGULAIR) 10 mg tablet Take 1 tablet (10 mg total) by mouth daily at bedtime (Patient not taking: Reported on 3/6/2024) 30 tablet 5   • topiramate (TOPAMAX) 50 MG tablet  (Patient not taking: Reported on 3/6/2024)       No current facility-administered medications for this visit.       Review of Systems   Constitutional:  Positive for activity change. Negative for appetite change, diaphoresis, fatigue and fever.   HENT: Negative.     Eyes: Negative.    Respiratory:  Negative for apnea, cough, chest tightness, shortness of breath and wheezing.    Cardiovascular:  Negative for chest pain, palpitations and leg swelling.   Gastrointestinal:  Negative for abdominal distention, abdominal pain, anal bleeding, constipation, diarrhea, nausea and vomiting.   Endocrine: Negative for cold intolerance, heat intolerance, polydipsia, polyphagia and polyuria.   Genitourinary:  Negative for difficulty urinating, dysuria, flank pain, hematuria and urgency.   Musculoskeletal:  Positive for gait problem. Negative for arthralgias, back pain, joint swelling and myalgias.        Hip pain   Skin:  Negative for color change, rash and wound.   Allergic/Immunologic: Negative for environmental allergies, food allergies and immunocompromised state.   Neurological:  Negative for dizziness, seizures, syncope, speech difficulty, numbness and headaches.   Hematological:  Negative for adenopathy. Does not bruise/bleed easily.   Psychiatric/Behavioral:  Negative for agitation, behavioral problems, hallucinations, sleep disturbance and suicidal ideas.          Objective:  Vitals:    03/06/24 1332   BP: 126/86   BP Location: Left arm   Patient Position: Sitting   Cuff Size: Large   Pulse: 84   Temp: 98.1 °F (36.7 °C)   TempSrc: Temporal   SpO2: 97%   Weight: 128 kg (282 lb)   Height: 5' 10\" (1.778 m)     Body mass index is 40.46 kg/m².     Physical Exam  Constitutional:       " General: He is not in acute distress.     Appearance: He is well-developed. He is not diaphoretic.   HENT:      Head: Normocephalic.      Right Ear: External ear normal.      Left Ear: External ear normal.      Nose: Nose normal.   Eyes:      General: No scleral icterus.        Right eye: No discharge.         Left eye: No discharge.      Conjunctiva/sclera: Conjunctivae normal.      Pupils: Pupils are equal, round, and reactive to light.   Neck:      Thyroid: No thyromegaly.      Trachea: No tracheal deviation.   Cardiovascular:      Rate and Rhythm: Normal rate and regular rhythm.      Heart sounds: Normal heart sounds. No murmur heard.     No friction rub. No gallop.   Pulmonary:      Effort: Pulmonary effort is normal. No respiratory distress.      Breath sounds: Normal breath sounds. No wheezing.   Abdominal:      General: Bowel sounds are normal.      Palpations: Abdomen is soft. There is no mass.      Tenderness: There is no abdominal tenderness. There is no guarding.   Musculoskeletal:         General: Tenderness present. No deformity.      Cervical back: Normal range of motion.      Comments: Pain left hip labral tear   Lymphadenopathy:      Cervical: No cervical adenopathy.   Skin:     General: Skin is warm and dry.      Findings: No erythema or rash.   Neurological:      Mental Status: He is alert and oriented to person, place, and time.      Cranial Nerves: No cranial nerve deficit.   Psychiatric:         Thought Content: Thought content normal.

## 2024-04-08 ENCOUNTER — HOSPITAL ENCOUNTER (OUTPATIENT)
Dept: NON INVASIVE DIAGNOSTICS | Facility: HOSPITAL | Age: 59
Discharge: HOME | End: 2024-04-08
Payer: COMMERCIAL

## 2024-04-08 DIAGNOSIS — R60.0 LEG EDEMA, LEFT: ICD-10-CM

## 2024-04-08 DIAGNOSIS — M79.605 LEFT LEG PAIN: ICD-10-CM

## 2024-04-08 PROCEDURE — 93971 EXTREMITY STUDY: CPT

## 2024-04-10 PROCEDURE — 93971 EXTREMITY STUDY: CPT | Performed by: SURGERY

## 2024-04-22 ENCOUNTER — TELEPHONE (OUTPATIENT)
Dept: FAMILY MEDICINE CLINIC | Facility: CLINIC | Age: 59
End: 2024-04-22

## 2024-04-22 NOTE — TELEPHONE ENCOUNTER
Gwen from ortho called stating that Dr Bere Martinez ordered a venous duplex because the pt had a partial  left knee replacement in a couple months ago and was complaining of knee pain it did show Evidence suggestive of an Acute occlusive deep vein thrombosis noted in one of the paired left gastrocnemius vein.    They started the patient on Elquis 5 mg BID for 30 days and patient is scheduled in May for another venous duplex     Just wanted you aware

## 2024-05-16 ENCOUNTER — HOSPITAL ENCOUNTER (OUTPATIENT)
Dept: NON INVASIVE DIAGNOSTICS | Facility: HOSPITAL | Age: 59
Discharge: HOME | End: 2024-05-16
Payer: COMMERCIAL

## 2024-05-16 DIAGNOSIS — R60.0 EDEMA OF LEFT LOWER LEG: ICD-10-CM

## 2024-05-16 PROCEDURE — 93971 EXTREMITY STUDY: CPT

## 2024-05-16 PROCEDURE — 93971 EXTREMITY STUDY: CPT | Performed by: SURGERY

## 2024-05-21 ENCOUNTER — NURSE TRIAGE (OUTPATIENT)
Age: 59
End: 2024-05-21

## 2024-05-21 NOTE — TELEPHONE ENCOUNTER
"Pt was dx'd with with DVT about 1 month ago and was placed on a months course of eliquis. Pt had repeat vascular ultrasound as pt is still having pt and it is still showing the DVT. Dr. Martinez is questioning if pt should be on stronger blood thinner. Dr. Martinez is requesting that pt schedule appt with Dr. Richard to discuss this. No available appts. Please reach out to pt.   Answer Assessment - Initial Assessment Questions  1. REASON FOR CALL or QUESTION: \"What is your reason for calling today?\" or \"How can I best help you?\" or \"What question do you have that I can help answer?\"      Pt was dx'd with with DVT about 1 month ago and was placed on a months course of eliquis. Pt had repeat vascular ultrasound as pt is still having pt and it is still showing the DVT. Dr. Martinez is questioning if pt should be on stronger blood thinner. Dr. Martinez is requesting that pt schedule appt with Dr. Richard to discuss this. No available appts. Please reach out to pt.    Protocols used: Information Only Call - No Triage-ADULT-[place]    "

## 2024-05-22 DIAGNOSIS — I82.5Y9 CHRONIC DEEP VEIN THROMBOSIS (DVT) OF PROXIMAL VEIN OF LOWER EXTREMITY, UNSPECIFIED LATERALITY (HCC): Primary | ICD-10-CM

## 2024-05-22 NOTE — TELEPHONE ENCOUNTER
Spoke to patient he is unable to come in, he is workiing until 12pm then [place] 2pm for Airport [place] to [place] until Monday night. Spoke to Dr. Richard, will send in script and wants to see him next week when he comes back.

## 2024-05-23 ENCOUNTER — TELEPHONE (OUTPATIENT)
Dept: BARIATRICS | Facility: CLINIC | Age: 59
End: 2024-05-23

## 2024-05-29 ENCOUNTER — OFFICE VISIT (OUTPATIENT)
Dept: FAMILY MEDICINE CLINIC | Facility: CLINIC | Age: 59
End: 2024-05-29
Payer: COMMERCIAL

## 2024-05-29 VITALS
WEIGHT: 280 LBS | SYSTOLIC BLOOD PRESSURE: 108 MMHG | DIASTOLIC BLOOD PRESSURE: 78 MMHG | HEIGHT: 70 IN | TEMPERATURE: 97.7 F | BODY MASS INDEX: 40.09 KG/M2 | OXYGEN SATURATION: 95 % | HEART RATE: 84 BPM

## 2024-05-29 DIAGNOSIS — I82.502 LEG DVT (DEEP VENOUS THROMBOEMBOLISM), CHRONIC, LEFT (HCC): Primary | ICD-10-CM

## 2024-05-29 DIAGNOSIS — J30.1 SEASONAL ALLERGIC RHINITIS DUE TO POLLEN: ICD-10-CM

## 2024-05-29 DIAGNOSIS — I82.5Y9 CHRONIC DEEP VEIN THROMBOSIS (DVT) OF PROXIMAL VEIN OF LOWER EXTREMITY, UNSPECIFIED LATERALITY (HCC): ICD-10-CM

## 2024-05-29 DIAGNOSIS — J45.40 MODERATE PERSISTENT REACTIVE AIRWAY DISEASE WITHOUT COMPLICATION: ICD-10-CM

## 2024-05-29 PROCEDURE — 99214 OFFICE O/P EST MOD 30 MIN: CPT | Performed by: FAMILY MEDICINE

## 2024-05-29 RX ORDER — AZELASTINE 1 MG/ML
1 SPRAY, METERED NASAL 2 TIMES DAILY
Qty: 30 ML | Refills: 3 | Status: SHIPPED | OUTPATIENT
Start: 2024-05-29

## 2024-05-29 RX ORDER — ALBUTEROL SULFATE 90 UG/1
2 AEROSOL, METERED RESPIRATORY (INHALATION) EVERY 6 HOURS PRN
Qty: 18 G | Refills: 5 | Status: SHIPPED | OUTPATIENT
Start: 2024-05-29 | End: 2024-06-03 | Stop reason: ALTCHOICE

## 2024-05-29 RX ORDER — FEXOFENADINE HCL 180 MG/1
180 TABLET ORAL DAILY
COMMUNITY

## 2024-05-29 NOTE — PROGRESS NOTES
Assessment/Plan:    Problem List Items Addressed This Visit    None  Visit Diagnoses       Leg DVT (deep venous thromboembolism), chronic, left (HCC)    -  Primary    Seasonal allergic rhinitis due to pollen        Relevant Medications    azelastine (ASTELIN) 0.1 % nasal spray    Moderate persistent reactive airway disease without complication        Relevant Medications    albuterol (Ventolin HFA) 90 mcg/act inhaler    Chronic deep vein thrombosis (DVT) of proximal vein of lower extremity, unspecified laterality (HCC)        Relevant Medications    apixaban (Eliquis) 5 mg             Diagnoses and all orders for this visit:    Leg DVT (deep venous thromboembolism), chronic, left (HCC)    Seasonal allergic rhinitis due to pollen  -     azelastine (ASTELIN) 0.1 % nasal spray; 1 spray into each nostril 2 (two) times a day Use in each nostril as directed    Moderate persistent reactive airway disease without complication  -     albuterol (Ventolin HFA) 90 mcg/act inhaler; Inhale 2 puffs every 6 (six) hours as needed for wheezing    Chronic deep vein thrombosis (DVT) of proximal vein of lower extremity, unspecified laterality (HCC)  -     apixaban (Eliquis) 5 mg; take 1 tablet PO BID    Other orders  -     fexofenadine (ALLEGRA) 180 MG tablet; Take 180 mg by mouth daily        No problem-specific Assessment & Plan notes found for this encounter.        Subjective:      Patient ID: Ancelmo Rosales Jr. is a 58 y.o. male.    Mr. Rosales is here chief complaint is that he has pain behind his left knee where he had a knee surgery done he has a acute versus subacute DVT in one of the paired gastrocnemius muscles he has completed 1 month of Eliquis he is now on his second month of Eliquis and will need 1/3-month follow that up with Doppler study to see if we need to continue his Eliquis also having terrible seasonal allergic rhinitis and reactive airway disease        The following portions of the patient's history were  reviewed and updated as appropriate:   He has a past medical history of Bee allergy status, Benign lipomatous neoplasm, Cellulitis of mid back region, High risk medication use, Internal derangement of knee, left, Pelvic cramping, Prostatism, and Sebaceous cyst.,  does not have any pertinent problems on file.,   has a past surgical history that includes ARTHROSCOPY ELBOW (Left, 2003); ARTHROSCOPY KNEE (Right); Shoulder arthroscopy (Left, 2003); Shoulder arthroscopy (Right, 04/2012); TONSILLECTOMY; Wrist surgery (Left); Knee surgery (Left); Hernia repair; Colonoscopy (01/09/2013); EGD (2009); EGD (03/2012); Replacement total knee bilateral; and Partial knee arthroplasty (Left, 12/05/2023).,  family history includes Asthma in his mother; Diabetes type II in his maternal grandfather.,   reports that he has never smoked. He quit smokeless tobacco use about 14 years ago.  His smokeless tobacco use included snuff. He reports current alcohol use. He reports that he does not use drugs.,  is allergic to bee venom and contrast  [iodinated contrast media]..  Current Outpatient Medications   Medication Sig Dispense Refill    albuterol (Ventolin HFA) 90 mcg/act inhaler Inhale 2 puffs every 6 (six) hours as needed for wheezing 18 g 5    apixaban (Eliquis) 5 mg take 1 tablet PO BID 60 tablet 0    azelastine (ASTELIN) 0.1 % nasal spray 1 spray into each nostril 2 (two) times a day Use in each nostril as directed 30 mL 3    EPINEPHrine (EPIPEN) 0.3 mg/0.3 mL SOAJ Inject 0.3 mL (0.3 mg total) into a muscle once for 1 dose 0.6 mL 0    fexofenadine (ALLEGRA) 180 MG tablet Take 180 mg by mouth daily      topiramate (TOPAMAX) 50 MG tablet  (Patient not taking: Reported on 5/29/2024)       No current facility-administered medications for this visit.       Review of Systems   Constitutional:  Positive for activity change and fever.   HENT:  Positive for postnasal drip, rhinorrhea and sinus pain.    Respiratory:  Positive for shortness of  "breath and wheezing.          Objective:  Vitals:    05/29/24 1421   BP: 108/78   BP Location: Left arm   Patient Position: Sitting   Cuff Size: Large   Pulse: 84   Temp: 97.7 °F (36.5 °C)   TempSrc: Temporal   SpO2: 95%   Weight: 127 kg (280 lb)   Height: 5' 10\" (1.778 m)     Body mass index is 40.18 kg/m².     Physical Exam  Constitutional:       General: He is not in acute distress.     Appearance: He is well-developed. He is not diaphoretic.   HENT:      Head: Normocephalic.      Right Ear: External ear normal.      Left Ear: External ear normal.      Nose: Nose normal.   Eyes:      General: No scleral icterus.        Right eye: No discharge.         Left eye: No discharge.      Conjunctiva/sclera: Conjunctivae normal.      Pupils: Pupils are equal, round, and reactive to light.   Neck:      Thyroid: No thyromegaly.      Trachea: No tracheal deviation.   Cardiovascular:      Rate and Rhythm: Normal rate and regular rhythm.      Heart sounds: Normal heart sounds. No murmur heard.     No friction rub. No gallop.   Pulmonary:      Effort: Pulmonary effort is normal. No respiratory distress.      Breath sounds: Normal breath sounds. No wheezing.   Abdominal:      General: Bowel sounds are normal.      Palpations: Abdomen is soft. There is no mass.      Tenderness: There is no abdominal tenderness. There is no guarding.   Musculoskeletal:         General: No deformity.      Cervical back: Normal range of motion.   Lymphadenopathy:      Cervical: No cervical adenopathy.   Skin:     General: Skin is warm and dry.      Findings: No erythema or rash.   Neurological:      Mental Status: He is alert and oriented to person, place, and time.      Cranial Nerves: No cranial nerve deficit.   Psychiatric:         Thought Content: Thought content normal.         "

## 2024-05-31 ENCOUNTER — TELEPHONE (OUTPATIENT)
Age: 59
End: 2024-05-31

## 2024-05-31 NOTE — TELEPHONE ENCOUNTER
PA for albuterol (Ventolin HFA) 90 mcg/act inhaler  Denied    Reason:          Message sent to provider pool Yes    Denial letter scanned into Media Yes    Appeal started No    (Provider will need to decide if appeal is warranted and send clinical documentation to PA team for initiation.)

## 2024-05-31 NOTE — TELEPHONE ENCOUNTER
PA for albuterol (Ventolin HFA) 90 mcg/act inhaler     Submitted via    [][employer]-KEY   [x][employer]-Case ID # [id number]   []Faxed to plan   []Other website   []Phone call Case ID #     Office notes sent, clinical questions answered. Awaiting determination    Turnaround time for your insurance to make a decision on your Prior Authorization can take 7-21 business days.

## 2024-06-03 DIAGNOSIS — J45.909 UNCOMPLICATED ASTHMA, UNSPECIFIED ASTHMA SEVERITY, UNSPECIFIED WHETHER PERSISTENT: Primary | ICD-10-CM

## 2024-06-03 RX ORDER — ALBUTEROL SULFATE 90 UG/1
2 AEROSOL, METERED RESPIRATORY (INHALATION) EVERY 6 HOURS PRN
Qty: 18 G | Refills: 5 | Status: SHIPPED | OUTPATIENT
Start: 2024-06-03

## 2024-06-03 NOTE — TELEPHONE ENCOUNTER
Pt had PROAIR in past, - not sure IF/HOW this medication did NOT work that  Albuterol HFA was ordered??    If Dr wants to keep on [facility] dialator, EDITH pintoud be covered, or Proventil HFA

## 2024-06-18 DIAGNOSIS — I82.5Y9 CHRONIC DEEP VEIN THROMBOSIS (DVT) OF PROXIMAL VEIN OF LOWER EXTREMITY, UNSPECIFIED LATERALITY (HCC): ICD-10-CM

## 2024-06-18 NOTE — TELEPHONE ENCOUNTER
Reason for call:   [x] Refill   [] Prior Auth  [] Other:     Office:   [x] PCP/Provider -   [] Specialty/Provider -     Medication: apixaban (Eliquis) 5 mg       Dose/Frequency:  take 1 tablet PO BID     Quantity: 60    Pharmacy: [id number] - [place] [address]     Does the patient have enough for 3 days?   [] Yes   [x] No - Send as HP to POD

## 2024-07-10 ENCOUNTER — OFFICE VISIT (OUTPATIENT)
Dept: FAMILY MEDICINE CLINIC | Facility: CLINIC | Age: 59
End: 2024-07-10
Payer: COMMERCIAL

## 2024-07-10 VITALS
DIASTOLIC BLOOD PRESSURE: 78 MMHG | TEMPERATURE: 97.6 F | HEIGHT: 70 IN | BODY MASS INDEX: 39.8 KG/M2 | SYSTOLIC BLOOD PRESSURE: 102 MMHG | WEIGHT: 278 LBS | RESPIRATION RATE: 15 BRPM | OXYGEN SATURATION: 97 % | HEART RATE: 85 BPM

## 2024-07-10 DIAGNOSIS — S83.512D RUPTURE OF ANTERIOR CRUCIATE LIGAMENT OF LEFT KNEE, SUBSEQUENT ENCOUNTER: ICD-10-CM

## 2024-07-10 DIAGNOSIS — M25.562 PAIN AND SWELLING OF LEFT KNEE: Primary | ICD-10-CM

## 2024-07-10 DIAGNOSIS — S83.207A ACUTE MENISCAL TEAR OF LEFT KNEE, INITIAL ENCOUNTER: ICD-10-CM

## 2024-07-10 DIAGNOSIS — Z12.11 COLON CANCER SCREENING: ICD-10-CM

## 2024-07-10 DIAGNOSIS — M25.462 PAIN AND SWELLING OF LEFT KNEE: Primary | ICD-10-CM

## 2024-07-10 PROBLEM — M17.9 OSTEOARTHRITIS OF KNEE: Status: RESOLVED | Noted: 2018-04-27 | Resolved: 2024-07-10

## 2024-07-10 PROBLEM — S69.90XA INJURY OF WRIST: Status: RESOLVED | Noted: 2018-04-27 | Resolved: 2024-07-10

## 2024-07-10 PROBLEM — S83.512A LEFT ANTERIOR CRUCIATE LIGAMENT TEAR: Status: ACTIVE | Noted: 2024-07-10

## 2024-07-10 PROBLEM — M16.9 OSTEOARTHRITIS OF HIP: Status: RESOLVED | Noted: 2018-04-27 | Resolved: 2024-07-10

## 2024-07-10 PROCEDURE — 99213 OFFICE O/P EST LOW 20 MIN: CPT | Performed by: PHYSICIAN ASSISTANT

## 2024-07-10 RX ORDER — TRIAMCINOLONE ACETONIDE 1 MG/G
CREAM TOPICAL
COMMUNITY
Start: 2024-06-05

## 2024-07-10 NOTE — PROGRESS NOTES
Ambulatory Visit  Name: Ancelmo Rosales Jr.      : 1965      MRN: [id number]  Encounter Provider: Holli Starks PA-C  Encounter Date: 7/10/2024   Encounter department: [facility]    Assessment & Plan   1. Pain and swelling of left knee  Assessment & Plan:  Referral to ortho for second opinion. Pt is S/P partial L knee replacement Dec 2023. MRI 2024 shows partial meniscal tears as well as ACL tear.   Orders:  -     Ambulatory Referral to Orthopedic Surgery; Future  2. Acute meniscal tear of left knee, initial encounter  Assessment & Plan:  Referral to ortho for second opinion. Pt is S/P partial L knee replacement Dec 2023. MRI 2024 shows partial meniscal tears as well as ACL tear.   Orders:  -     Ambulatory Referral to Orthopedic Surgery; Future  3. Rupture of anterior cruciate ligament of left knee, subsequent encounter  Assessment & Plan:  Referral to ortho for second opinion. Pt is S/P partial L knee replacement Dec 2023. MRI 2024 shows partial meniscal tears as well as ACL tear.   Orders:  -     Ambulatory Referral to Orthopedic Surgery; Future  4. Colon cancer screening  -     Ambulatory Referral to Gastroenterology; Future      Depression Screening and Follow-up Plan: Patient was screened for depression during today's encounter. They screened negative with a PHQ-2 score of 0.      History of Present Illness     Ancelmo is here today complaining of pain/swelling in L knee. Had partial replacement done Dec 2023 with [facility], states knee has never felt good. He did develop a chronic DVT in LLE, taking Eliquis. Pt states 1.5 weeks ago, developed increased pain and swelling in L knee. MRI L knee in 2024 shows meniscal tearing as well as ACL tear.         Review of Systems   Constitutional:  Negative for chills and fever.   HENT:  Negative for ear pain and sore throat.    Eyes:  Negative for pain and visual disturbance.   Respiratory:  Negative for cough and  "shortness of breath.    Cardiovascular:  Negative for chest pain and palpitations.   Gastrointestinal:  Negative for abdominal pain and vomiting.   Genitourinary:  Negative for dysuria and hematuria.   Musculoskeletal:  Positive for arthralgias and joint swelling. Negative for back pain.   Skin:  Negative for color change and rash.   Neurological:  Negative for seizures and syncope.   All other systems reviewed and are negative.      Objective     /78 (BP Location: Left arm, Patient Position: Sitting, Cuff Size: Adult)   Pulse 85   Temp 97.6 °F (36.4 °C) (Temporal)   Resp 15   Ht 5' 10\" (1.778 m)   Wt 126 kg (278 lb)   SpO2 97%   BMI 39.89 kg/m²     Physical Exam  Vitals and nursing note reviewed.   Constitutional:       General: He is not in acute distress.     Appearance: He is well-developed.   HENT:      Head: Normocephalic and atraumatic.   Eyes:      Conjunctiva/sclera: Conjunctivae normal.   Cardiovascular:      Rate and Rhythm: Normal rate and regular rhythm.      Heart sounds: No murmur heard.  Pulmonary:      Effort: Pulmonary effort is normal. No respiratory distress.      Breath sounds: Normal breath sounds.   Abdominal:      Palpations: Abdomen is soft.      Tenderness: There is no abdominal tenderness.   Musculoskeletal:         General: No swelling.      Cervical back: Neck supple.      Left knee: Swelling and effusion present. Tenderness present.   Skin:     General: Skin is warm and dry.      Capillary Refill: Capillary refill takes less than 2 seconds.   Neurological:      Mental Status: He is alert.   Psychiatric:         Mood and Affect: Mood normal.       Administrative Statements           "

## 2024-07-10 NOTE — ASSESSMENT & PLAN NOTE
Referral to ortho for second opinion. Pt is S/P partial L knee replacement Dec 2023. MRI April 2024 shows partial meniscal tears as well as ACL tear.

## 2024-07-18 ENCOUNTER — TELEPHONE (OUTPATIENT)
Age: 59
End: 2024-07-18

## 2024-07-18 DIAGNOSIS — I82.502 LEG DVT (DEEP VENOUS THROMBOEMBOLISM), CHRONIC, LEFT (HCC): Primary | ICD-10-CM

## 2024-07-18 DIAGNOSIS — I82.5Y9 CHRONIC DEEP VEIN THROMBOSIS (DVT) OF PROXIMAL VEIN OF LOWER EXTREMITY, UNSPECIFIED LATERALITY (HCC): ICD-10-CM

## 2024-07-18 NOTE — TELEPHONE ENCOUNTER
Patient called in requesting a call back regarding his medication. He states he will be done with his script apixaban (Eliquis) 5 mg. He wanted to know if he just stops this medication completely or if this is something he will need to be winged off of? Patient also states he was suppose to have a ultrasound to follow up on the blood clot in his left knee. No orders in chart. Please advise.

## 2024-07-24 ENCOUNTER — OFFICE VISIT (OUTPATIENT)
Dept: OBGYN CLINIC | Facility: CLINIC | Age: 59
End: 2024-07-24
Payer: COMMERCIAL

## 2024-07-24 VITALS
SYSTOLIC BLOOD PRESSURE: 130 MMHG | DIASTOLIC BLOOD PRESSURE: 80 MMHG | HEART RATE: 83 BPM | OXYGEN SATURATION: 98 % | WEIGHT: 274.6 LBS | TEMPERATURE: 97.7 F | BODY MASS INDEX: 39.31 KG/M2 | HEIGHT: 70 IN

## 2024-07-24 DIAGNOSIS — M25.462 PAIN AND SWELLING OF LEFT KNEE: ICD-10-CM

## 2024-07-24 DIAGNOSIS — S83.512D RUPTURE OF ANTERIOR CRUCIATE LIGAMENT OF LEFT KNEE, SUBSEQUENT ENCOUNTER: ICD-10-CM

## 2024-07-24 DIAGNOSIS — Z96.653 PRESENCE OF ARTIFICIAL KNEE JOINT, BILATERAL: Primary | ICD-10-CM

## 2024-07-24 DIAGNOSIS — M23.201 OTHER OLD TEAR OF LATERAL MENISCUS OF LEFT KNEE: ICD-10-CM

## 2024-07-24 DIAGNOSIS — M25.562 PAIN AND SWELLING OF LEFT KNEE: ICD-10-CM

## 2024-07-24 DIAGNOSIS — Z86.718 HISTORY OF DVT (DEEP VEIN THROMBOSIS): ICD-10-CM

## 2024-07-24 PROCEDURE — 99204 OFFICE O/P NEW MOD 45 MIN: CPT | Performed by: ORTHOPAEDIC SURGERY

## 2024-07-24 NOTE — PROGRESS NOTES
ASSESSMENT/PLAN:    Diagnoses and all orders for this visit:    Presence of artificial knee joint, bilateral    Pain and swelling of left knee  -     Ambulatory Referral to Orthopedic Surgery    Other old tear of lateral meniscus of left knee  -     Ambulatory Referral to Orthopedic Surgery    Rupture of anterior cruciate ligament of left knee, subsequent encounter  -     Ambulatory Referral to Orthopedic Surgery    History of DVT (deep vein thrombosis)        Plan: I would recommend that he discuss treatment with his operating surgeon.  He indicates he is scheduled to see the surgeon tomorrow.  At this time, I do not think there is a good solution.  Any attempted reconstruction of the anterior cruciate ligament would potentially compromise the medial compartment arthroplasty.  Conversion to a total knee replacement would be a fairly significant surgery but could be considered.  However, he may recover with further physical therapy and strengthening and may find the knee is not problematic for him as recovery from knee replacement surgery can take up to 1 year.  I would recommend follow-up with me as needed.  If he is unhappy with the discussion with his surgeon, he can contact the office and I would be happy to have him get a second opinion from one of our arthroplasty surgeons.    Return if symptoms worsen or fail to improve.      _____________________________________________________  CHIEF COMPLAINT:  Chief Complaint   Patient presents with    Left Knee - Pain         SUBJECTIVE:  Ancelmo Rosales  is a 58 y.o. year old male who presents for evaluation of his left knee.  He underwent unicompartmental right knee replacement arthroplasty several years ago and unicompartmental left knee arthroplasty in December 2023 at the [facility].  Surgery was performed by the same surgeon.  He states that his left knee has continued to bother him since the surgery.  He states that the pain prior to surgery was  localized to the medial aspect of his knee and he does not recall any anterior or lateral pain.  He is now experiencing some pain in the posterior aspect of his leg and when seen by the orthopedic surgeon for this complaint was diagnosed with a DVT after ultrasound was obtained.  He was placed on Eliquis which she continues to take and he has a third ultrasound scheduled for the near future to assess for resolution of the DVT.  He has had 2 MRIs, one obtained in April 2024 and a second more recently because of feelings of instability in the knee.  Most recently, he was playing golf when he twisted the knee as he swung his  and felt pain.  When questioning him for a instability episode, he does not specifically recall whether he had a giving way or instability episode.  He presents today for a second opinion regarding treatment options.  He states he is scheduled to see his operating surgeon tomorrow.    PAST MEDICAL HISTORY:  Past Medical History:   Diagnosis Date    Bee allergy status     Last Assessed 8/25/2016    Benign lipomatous neoplasm     Last Assessed 7/7/2017    Cellulitis of mid back region     Last Assessed 11/13/2016    High risk medication use     Last Assessed 12/21/2015    Internal derangement of knee, left     Last Assessed 2/28/2017    Pelvic cramping     Last Assessed 8/25/2016    Prostatism     Last Assessed 8/25/2016    Sebaceous cyst     Last Assessed 7/01/2017       PAST SURGICAL HISTORY:  Past Surgical History:   Procedure Laterality Date    ARTHROSCOPY ELBOW Left 2003    ARTHROSCOPY KNEE Right     COLONOSCOPY  01/09/2013    normal    EGD  2009    regular Z line and biopsy negative for intestinal metaplasia by Dr. Avila    EGD  03/2012    intact fundoplication no abnormalities by Dr. Avila biopsy esophagus showed normal mucosa    HERNIA REPAIR      x 2    KNEE SURGERY Left     PARTIAL KNEE ARTHROPLASTY Left 12/05/2023    Dr [facility]    REPLACEMENT TOTAL KNEE BILATERAL       SHOULDER ARTHROSCOPY Left 2003    x2    SHOULDER ARTHROSCOPY Right 04/2012    x2    TONSILLECTOMY      WRIST SURGERY Left     Wrist hardware - Titanium plate       FAMILY HISTORY:  Family History   Problem Relation Age of Onset    Asthma Mother     Diabetes type II Maternal Grandfather        SOCIAL HISTORY:  Social History     Tobacco Use    Smoking status: Never    Smokeless tobacco: Former     Types: Snuff     Quit date: 2010   Vaping Use    Vaping status: Never Used   Substance Use Topics    Alcohol use: Yes     Comment: Occasionally beer    Drug use: No       MEDICATIONS:    Current Outpatient Medications:     albuterol (ProAir HFA) 90 mcg/act inhaler, Inhale 2 puffs every 6 (six) hours as needed for wheezing, Disp: 18 g, Rfl: 5    apixaban (Eliquis) 5 mg, take 1 tablet PO BID, Disp: 60 tablet, Rfl: 01    azelastine (ASTELIN) 0.1 % nasal spray, 1 spray into each nostril 2 (two) times a day Use in each nostril as directed, Disp: 30 mL, Rfl: 3    EPINEPHrine (EPIPEN) 0.3 mg/0.3 mL SOAJ, Inject 0.3 mL (0.3 mg total) into a muscle once for 1 dose, Disp: 0.6 mL, Rfl: 0    fexofenadine (ALLEGRA) 180 MG tablet, Take 180 mg by mouth daily, Disp: , Rfl:     triamcinolone (KENALOG) 0.1 % cream, apply topically twice a day TO AREA ON RIGHT EAR FOR 2 WEEKS FOR FLARES/REDNESS, Disp: , Rfl:     topiramate (TOPAMAX) 50 MG tablet, , Disp: , Rfl:     ALLERGIES:  Allergies   Allergen Reactions    Bee Venom Anaphylaxis    Contrast  [Iodinated Contrast Media] Rash       Review of systems:   Constitutional: Negative for fatigue, fever or loss of apetite.   HENT: Negative.    Respiratory: Negative for shortness of breath, dyspnea.    Cardiovascular: Negative for chest pain/tightness.   Gastrointestinal: Negative for abdominal pain, N/V.   Endocrine: Negative for cold/heat intolerance, unexplained weight loss/gain.   Genitourinary: Negative for flank pain, dysuria, hematuria.   Musculoskeletal: Positive as in the HPI  Skin:  "Negative for rash.    Neurological: Negative  Psychiatric/Behavioral: Negative for agitation.  _____________________________________________________  PHYSICAL EXAMINATION:    Blood pressure 130/80, pulse 83, temperature 97.7 °F (36.5 °C), temperature source Temporal, height 5' 10\" (1.778 m), weight 125 kg (274 lb 9.6 oz), SpO2 98%.    General: well developed and well nourished, alert, oriented times 3, and appears comfortable  Psychiatric: Normal  HEENT: Benign  Cardiovascular: Regular    Pulmonary: No wheezing or stridor  Abdomen: Soft, Nontender  Skin: No masses, erythema, lacerations, fluctation, ulcerations  Neurovascular: Motor and sensory exams are intact and pulses are palpable.    MUSCULOSKELETAL EXAMINATION:    The left knee exam demonstrates a well-healed incision consistent with his past surgical history.  He continues to demonstrate some postoperative swelling/effusion.  He has full extension and flexion to about 100 degrees but complains of pain with flexion greater than 90 degrees.  He has a 2B anterior drawer and Lachman test.  Pivot shift could not be performed secondary to apprehension.  He does complain of some popliteal tenderness.  Ligaments are grossly stable although there is slight laxity noted with valgus stress.  He has no medial tenderness.  There was slight tenderness laterally about the tibia and femur.  There was some mild lateral joint line tenderness but Paradise's test could not be performed.    The right knee exam demonstrates a well-healed incision, full extension and flexion to 120 degrees without complaints.  Ligaments are grossly stable and there is no effusion noted.      _____________________________________________________  STUDIES REVIEWED:  X-rays of the knee were not available for my review.  The most recent MRI was available.  There does appear to be a lateral meniscal tear as well as a popliteal cyst and some residual effusion.  The anterior crucial ligament is poorly " visualized.  The posterior crucial ligament appears intact.  There is a medial compartment arthroplasty in place.    This MRI report was reviewed as well as an MRI report from that obtained in April 2024.  Both MRI reports seem to indicate a similar degree of pathology.        Sanju Monique

## 2024-08-02 ENCOUNTER — HOSPITAL ENCOUNTER (OUTPATIENT)
Dept: NON INVASIVE DIAGNOSTICS | Facility: HOSPITAL | Age: 59
Discharge: HOME | End: 2024-08-02
Payer: COMMERCIAL

## 2024-08-02 DIAGNOSIS — I82.502 LEG DVT (DEEP VENOUS THROMBOEMBOLISM), CHRONIC, LEFT (HCC): ICD-10-CM

## 2024-08-02 PROCEDURE — 93971 EXTREMITY STUDY: CPT

## 2024-08-02 PROCEDURE — 93971 EXTREMITY STUDY: CPT | Performed by: SURGERY

## 2024-08-03 NOTE — RESULT ENCOUNTER NOTE
Call patient to notify normal results at present and like so he can probably come off his blood thinners but stay on an aspirin a day

## 2024-09-03 ENCOUNTER — VBI (OUTPATIENT)
Dept: ADMINISTRATIVE | Facility: OTHER | Age: 59
End: 2024-09-03

## 2024-09-03 NOTE — TELEPHONE ENCOUNTER
09/03/24 2:20 PM     Chart reviewed for CRC: Colonoscopy was/were not submitted to the patient's insurance.     Jacqueline Rosa MA   [facility]

## 2024-10-15 ENCOUNTER — OFFICE VISIT (OUTPATIENT)
Dept: FAMILY MEDICINE CLINIC | Facility: CLINIC | Age: 59
End: 2024-10-15
Payer: COMMERCIAL

## 2024-10-15 VITALS
HEART RATE: 85 BPM | OXYGEN SATURATION: 98 % | SYSTOLIC BLOOD PRESSURE: 128 MMHG | WEIGHT: 279.4 LBS | TEMPERATURE: 97.8 F | HEIGHT: 70 IN | BODY MASS INDEX: 40 KG/M2 | DIASTOLIC BLOOD PRESSURE: 82 MMHG

## 2024-10-15 DIAGNOSIS — R68.2 DRY MOUTH: ICD-10-CM

## 2024-10-15 DIAGNOSIS — R63.1 POLYDIPSIA: ICD-10-CM

## 2024-10-15 DIAGNOSIS — R35.89 POLYURIA: ICD-10-CM

## 2024-10-15 DIAGNOSIS — R25.2 MUSCLE CRAMPING: Primary | ICD-10-CM

## 2024-10-15 PROCEDURE — 99214 OFFICE O/P EST MOD 30 MIN: CPT | Performed by: PHYSICIAN ASSISTANT

## 2024-10-15 RX ORDER — METHOCARBAMOL 500 MG/1
500 TABLET, FILM COATED ORAL 3 TIMES DAILY PRN
Qty: 60 TABLET | Refills: 0 | Status: SHIPPED | OUTPATIENT
Start: 2024-10-15

## 2024-10-15 RX ORDER — ASPIRIN 81 MG/1
81 TABLET, CHEWABLE ORAL DAILY
COMMUNITY

## 2024-10-15 NOTE — PROGRESS NOTES
Ambulatory Visit  Name: Ancelmo Rosales Jr.      : 1965      MRN: [id number]  Encounter Provider: Rakel Ricketts PA-C  Encounter Date: 10/15/2024   Encounter department: [facility]    Assessment & Plan  Muscle cramping  Labs ordered to evaluate.  Encouraged patient to cut back on caffeine and continue to drink plenty of water.  Prescription for Robaxin.  He may try this as needed for muscle spasms.  Orders:    Comprehensive metabolic panel; Future    Magnesium; Future    Hemoglobin A1C; Future    methocarbamol (ROBAXIN) 500 mg tablet; Take 1 tablet (500 mg total) by mouth 3 (three) times a day as needed for muscle spasms    Dry mouth  Labs ordered to evaluate for Sjogren's disease.  Orders:    Sjogren's Antibodies; Future    Polyuria  Labs ordered to evaluate.  Concerned about possible underlying diabetes.  Orders:    Comprehensive metabolic panel; Future    Magnesium; Future    Hemoglobin A1C; Future    Polydipsia  Labs ordered to evaluate.  Concerned about possible underlying diabetes  Orders:    Comprehensive metabolic panel; Future    Magnesium; Future    Hemoglobin A1C; Future       History of Present Illness     Ancelmo is a very pleasant 58-year-old male who is here today complaining of muscle cramping.  He admits that he gets the cramps in his back, legs, and under his left rib area.  He admits that this has been happening a lot.  It is typically worse in the evening when he lies down to relax.  He admits that this has been interrupting his sleep.  He typically drinks a pot of coffee in the morning and 4-5 bottles of water throughout the day.  He will typically have a glass of iced tea in the evening.  He admits that he urinates 1-2 times per hour.  He is also complaining of always being thirsty.  He admits that diabetes does run in his family.  He has never been tested for Sjogren's or any autoimmune diseases.  He has been trying hydration powder and electrolyte supplements, but it is  "not providing relief.  He denies any new medications.          Review of Systems   Constitutional:  Negative for chills, diaphoresis, fatigue and fever.   HENT:  Negative for congestion, ear pain, postnasal drip, rhinorrhea, sneezing, sore throat and trouble swallowing.         Dry mouth   Eyes:  Negative for pain and visual disturbance.   Respiratory:  Negative for apnea, cough, shortness of breath and wheezing.    Cardiovascular:  Negative for chest pain and palpitations.   Gastrointestinal:  Negative for abdominal pain, constipation, diarrhea, nausea and vomiting.   Endocrine: Positive for polydipsia and polyuria.   Genitourinary:  Negative for dysuria.   Musculoskeletal:  Positive for myalgias. Negative for arthralgias and gait problem.        Severe muscle cramping   Neurological:  Negative for dizziness, syncope, weakness, light-headedness, numbness and headaches.   Psychiatric/Behavioral:  Negative for suicidal ideas. The patient is not nervous/anxious.            Objective     /82   Pulse 85   Temp 97.8 °F (36.6 °C)   Ht 5' 10\" (1.778 m)   Wt 127 kg (279 lb 6.4 oz)   SpO2 98%   BMI 40.09 kg/m²     Physical Exam  Constitutional:       Appearance: He is well-developed.   HENT:      Head: Normocephalic and atraumatic.      Right Ear: Tympanic membrane, ear canal and external ear normal.      Left Ear: Tympanic membrane, ear canal and external ear normal.      Nose: Nose normal.      Mouth/Throat:      Pharynx: No oropharyngeal exudate or posterior oropharyngeal erythema.   Eyes:      Extraocular Movements: Extraocular movements intact.   Cardiovascular:      Rate and Rhythm: Normal rate and regular rhythm.      Heart sounds: Normal heart sounds. No murmur heard.     No friction rub. No gallop.   Pulmonary:      Effort: Pulmonary effort is normal. No respiratory distress.      Breath sounds: Normal breath sounds. No wheezing or rales.   Musculoskeletal:         General: No signs of injury. Normal " range of motion.      Cervical back: Normal range of motion and neck supple.      Right lower leg: No edema.      Left lower leg: No edema.   Skin:     General: Skin is warm and dry.   Neurological:      Mental Status: He is alert and oriented to person, place, and time.   Psychiatric:         Behavior: Behavior normal.         Thought Content: Thought content normal.         Judgment: Judgment normal.

## 2024-10-17 ENCOUNTER — APPOINTMENT (OUTPATIENT)
Dept: LAB | Facility: MEDICAL CENTER | Age: 59
End: 2024-10-17
Payer: COMMERCIAL

## 2024-10-17 DIAGNOSIS — R63.1 POLYDIPSIA: ICD-10-CM

## 2024-10-17 DIAGNOSIS — R68.2 DRY MOUTH: ICD-10-CM

## 2024-10-17 DIAGNOSIS — R25.2 MUSCLE CRAMPING: ICD-10-CM

## 2024-10-17 DIAGNOSIS — R35.89 POLYURIA: ICD-10-CM

## 2024-10-17 LAB
ALBUMIN SERPL BCG-MCNC: 4.1 G/DL (ref 3.5–5)
ALP SERPL-CCNC: 85 U/L (ref 34–104)
ALT SERPL W P-5'-P-CCNC: 26 U/L (ref 7–52)
ANION GAP SERPL CALCULATED.3IONS-SCNC: 11 MMOL/L (ref 4–13)
AST SERPL W P-5'-P-CCNC: 22 U/L (ref 13–39)
BILIRUB SERPL-MCNC: 0.85 MG/DL (ref 0.2–1)
BUN SERPL-MCNC: 15 MG/DL (ref 5–25)
CALCIUM SERPL-MCNC: 9.1 MG/DL (ref 8.4–10.2)
CHLORIDE SERPL-SCNC: 104 MMOL/L (ref 96–108)
CO2 SERPL-SCNC: 24 MMOL/L (ref 21–32)
CREAT SERPL-MCNC: 0.76 MG/DL (ref 0.6–1.3)
EST. AVERAGE GLUCOSE BLD GHB EST-MCNC: 128 MG/DL
GFR SERPL CREATININE-BSD FRML MDRD: 100 ML/MIN/1.73SQ M
GLUCOSE P FAST SERPL-MCNC: 106 MG/DL (ref 65–99)
HBA1C MFR BLD: 6.1 %
MAGNESIUM SERPL-MCNC: 2 MG/DL (ref 1.9–2.7)
POTASSIUM SERPL-SCNC: 4.1 MMOL/L (ref 3.5–5.3)
PROT SERPL-MCNC: 7.2 G/DL (ref 6.4–8.4)
SODIUM SERPL-SCNC: 139 MMOL/L (ref 135–147)

## 2024-10-17 PROCEDURE — 86235 NUCLEAR ANTIGEN ANTIBODY: CPT

## 2024-10-17 PROCEDURE — 36415 COLL VENOUS BLD VENIPUNCTURE: CPT

## 2024-10-17 PROCEDURE — 80053 COMPREHEN METABOLIC PANEL: CPT

## 2024-10-17 PROCEDURE — 83036 HEMOGLOBIN GLYCOSYLATED A1C: CPT

## 2024-10-17 PROCEDURE — 83735 ASSAY OF MAGNESIUM: CPT

## 2024-10-18 LAB
ENA SS-A AB SER-ACNC: <0.2 AI (ref 0–0.9)
ENA SS-B AB SER-ACNC: <0.2 AI (ref 0–0.9)

## 2024-10-21 DIAGNOSIS — R73.09 ELEVATED GLUCOSE: Primary | ICD-10-CM

## 2024-10-21 RX ORDER — METFORMIN HYDROCHLORIDE 500 MG/1
500 TABLET, EXTENDED RELEASE ORAL
Qty: 90 TABLET | Refills: 1 | Status: SHIPPED | OUTPATIENT
Start: 2024-10-21

## 2024-11-16 NOTE — PROGRESS NOTES
Call patient to notify normal results urine sample did not show any cancer cells but he still needs to see the urologist for a possible scoping Jozef Barrera(Attending)

## 2025-01-15 ENCOUNTER — OFFICE VISIT (OUTPATIENT)
Dept: FAMILY MEDICINE CLINIC | Facility: CLINIC | Age: 60
End: 2025-01-15
Payer: COMMERCIAL

## 2025-01-15 VITALS
WEIGHT: 288 LBS | HEIGHT: 70 IN | DIASTOLIC BLOOD PRESSURE: 78 MMHG | BODY MASS INDEX: 41.23 KG/M2 | RESPIRATION RATE: 17 BRPM | TEMPERATURE: 97.4 F | HEART RATE: 68 BPM | OXYGEN SATURATION: 96 % | SYSTOLIC BLOOD PRESSURE: 110 MMHG

## 2025-01-15 DIAGNOSIS — J98.8 RESPIRATORY INFECTION: Primary | ICD-10-CM

## 2025-01-15 PROCEDURE — 99213 OFFICE O/P EST LOW 20 MIN: CPT | Performed by: PHYSICIAN ASSISTANT

## 2025-01-15 RX ORDER — AZITHROMYCIN 250 MG/1
TABLET, FILM COATED ORAL
Qty: 6 TABLET | Refills: 0 | Status: SHIPPED | OUTPATIENT
Start: 2025-01-15 | End: 2025-01-20

## 2025-01-15 NOTE — PROGRESS NOTES
"Name: Ancelmo Rosales Jr.      : 1965      MRN: [id number]  Encounter Provider: Holli Starks PA-C  Encounter Date: 1/15/2025   Encounter department: [facility]  :  Assessment & Plan  Respiratory infection  Given Zithromax, take until finished.  Orders:  •  azithromycin (Zithromax) 250 mg tablet; Day 1 take 2 tablets, days 2-5 take 1 tablet.          Depression Screening and Follow-up Plan: Patient was screened for depression during today's encounter. They screened negative with a PHQ-2 score of 0.      History of Present Illness     Ancelmo is here today complaining of ongoing acute sick symptoms.      Review of Systems   Constitutional:  Negative for chills and fever.   HENT:  Positive for congestion, postnasal drip, rhinorrhea and sore throat. Negative for ear pain.    Eyes:  Negative for pain and visual disturbance.   Respiratory:  Positive for cough. Negative for shortness of breath.    Cardiovascular:  Negative for chest pain and palpitations.   Gastrointestinal:  Negative for abdominal pain and vomiting.   Genitourinary:  Negative for dysuria and hematuria.   Musculoskeletal:  Negative for arthralgias and back pain.   Skin:  Negative for color change and rash.   Neurological:  Negative for seizures and syncope.   All other systems reviewed and are negative.      Objective   /78 (BP Location: Left arm, Patient Position: Sitting, Cuff Size: Standard)   Pulse 68   Temp (!) 97.4 °F (36.3 °C) (Temporal)   Resp 17   Ht 5' 10\" (1.778 m)   Wt 131 kg (288 lb)   SpO2 96%   BMI 41.32 kg/m²      Physical Exam  Vitals reviewed.   Constitutional:       General: He is not in acute distress.     Appearance: He is well-developed. He is not diaphoretic.   HENT:      Head: Normocephalic and atraumatic.      Right Ear: Hearing, tympanic membrane, ear canal and external ear normal.      Left Ear: Hearing, tympanic membrane, ear canal and external ear normal.      Nose: Congestion and rhinorrhea " present.      Mouth/Throat:      Mouth: Mucous membranes are moist.      Pharynx: Oropharynx is clear. Uvula midline. No oropharyngeal exudate.   Eyes:      General: No scleral icterus.        Right eye: No discharge.         Left eye: No discharge.      Conjunctiva/sclera: Conjunctivae normal.   Neck:      Thyroid: No thyromegaly.      Vascular: No carotid bruit.   Cardiovascular:      Rate and Rhythm: Normal rate and regular rhythm.      Heart sounds: Normal heart sounds. No murmur heard.  Pulmonary:      Effort: Pulmonary effort is normal. No respiratory distress.      Breath sounds: Normal breath sounds. No wheezing.   Abdominal:      General: Bowel sounds are normal. There is no distension.      Palpations: Abdomen is soft. There is no mass.      Tenderness: There is no abdominal tenderness. There is no guarding or rebound.   Musculoskeletal:         General: No tenderness. Normal range of motion.      Cervical back: Neck supple.   Lymphadenopathy:      Cervical: No cervical adenopathy.   Skin:     General: Skin is warm and dry.      Findings: No erythema or rash.   Neurological:      Mental Status: He is alert and oriented to person, place, and time.   Psychiatric:         Behavior: Behavior normal.         Thought Content: Thought content normal.         Judgment: Judgment normal.

## 2025-04-15 ENCOUNTER — TELEPHONE (OUTPATIENT)
Age: 60
End: 2025-04-15

## 2025-04-15 NOTE — TELEPHONE ENCOUNTER
Please have patient stop metformin for the next few days to see if abdominal pain and diarrhea resolves.  It is possible that it is the metformin, but it may also be due to a viral gastroenteritis.  We will not know unless we stop the medication at least temporarily.

## 2025-04-15 NOTE — TELEPHONE ENCOUNTER
Pt states he has been taking the Metformin 500 mg since October.  Lately he has noticed in the past 5 days or so that he gets very bad abdominal pain and diarrhea.  Pt states he does not feel sick at all but is away in [place] for the holidays with family and they are concerned.    Asking for recommendations.

## 2025-04-23 ENCOUNTER — TELEPHONE (OUTPATIENT)
Age: 60
End: 2025-04-23

## 2025-04-23 NOTE — TELEPHONE ENCOUNTER
Patient states he returned from vacation to find a Cologuard kit on his door step.    Would like to know who ordered it.  Review of chart did not show any order for Cologuard.    Patient would like to know if provider ordered, and if so should he complete it.

## 2025-06-11 ENCOUNTER — RA CDI HCC (OUTPATIENT)
Dept: OTHER | Facility: HOSPITAL | Age: 60
End: 2025-06-11

## 2025-06-18 ENCOUNTER — OFFICE VISIT (OUTPATIENT)
Dept: FAMILY MEDICINE CLINIC | Facility: CLINIC | Age: 60
End: 2025-06-18
Payer: COMMERCIAL

## 2025-06-18 VITALS
BODY MASS INDEX: 38.88 KG/M2 | SYSTOLIC BLOOD PRESSURE: 118 MMHG | OXYGEN SATURATION: 97 % | TEMPERATURE: 97.3 F | HEIGHT: 70 IN | WEIGHT: 271.6 LBS | DIASTOLIC BLOOD PRESSURE: 76 MMHG

## 2025-06-18 DIAGNOSIS — G47.62 NOCTURNAL LEG CRAMPS: Primary | ICD-10-CM

## 2025-06-18 DIAGNOSIS — R73.9 ELEVATED RANDOM BLOOD GLUCOSE LEVEL: ICD-10-CM

## 2025-06-18 DIAGNOSIS — E78.2 MIXED HYPERLIPIDEMIA: ICD-10-CM

## 2025-06-18 DIAGNOSIS — I73.9 CLAUDICATION (HCC): ICD-10-CM

## 2025-06-18 PROCEDURE — 99214 OFFICE O/P EST MOD 30 MIN: CPT | Performed by: FAMILY MEDICINE

## 2025-06-18 RX ORDER — ATOGEPANT 60 MG/1
60 TABLET ORAL DAILY
COMMUNITY
Start: 2025-06-02

## 2025-06-18 RX ORDER — SUMATRIPTAN SUCCINATE 25 MG/1
25 TABLET ORAL ONCE AS NEEDED
COMMUNITY

## 2025-06-18 NOTE — PROGRESS NOTES
"Name: Ancelmo Rosales Jr.      : 1965      MRN: [id number]  Encounter Provider: Castro Richard DO  Encounter Date: 2025   Encounter department: [facility]  :  Assessment & Plan  Nocturnal leg cramps    Orders:    Magnesium; Future    Phosphorus; Future    Calcium, ionized; Future    Basic metabolic panel    VAS MORENA and waveform analysis, multiple levels; Future    Claudication (HCC)    Orders:    VAS MORENA and waveform analysis, multiple levels; Future           History of Present Illness   Nocturnal leg cramps      Review of Systems   Constitutional:  Negative for activity change, appetite change, diaphoresis, fatigue and fever.   HENT: Negative.     Eyes: Negative.    Respiratory:  Negative for apnea, cough, chest tightness, shortness of breath and wheezing.    Cardiovascular:  Negative for chest pain, palpitations and leg swelling.   Gastrointestinal:  Negative for abdominal distention, abdominal pain, anal bleeding, constipation, diarrhea, nausea and vomiting.   Endocrine: Negative for cold intolerance, heat intolerance, polydipsia, polyphagia and polyuria.   Genitourinary:  Negative for difficulty urinating, dysuria, flank pain, hematuria and urgency.   Musculoskeletal:  Negative for arthralgias, back pain, gait problem, joint swelling and myalgias.   Skin:  Negative for color change, rash and wound.   Allergic/Immunologic: Negative for environmental allergies, food allergies and immunocompromised state.   Neurological:  Negative for dizziness, seizures, syncope, speech difficulty, numbness and headaches.   Hematological:  Negative for adenopathy. Does not bruise/bleed easily.   Psychiatric/Behavioral:  Negative for agitation, behavioral problems, hallucinations, sleep disturbance and suicidal ideas.        Objective   /76   Temp (!) 97.3 °F (36.3 °C)   Ht 5' 10\" (1.778 m)   Wt 123 kg (271 lb 9.6 oz)   SpO2 97%   BMI 38.97 kg/m²      Physical Exam  Constitutional:       " Appearance: He is well-developed.   HENT:      Head: Normocephalic and atraumatic.      Right Ear: External ear normal.      Left Ear: External ear normal.      Nose: Nose normal.     Eyes:      Conjunctiva/sclera: Conjunctivae normal.      Pupils: Pupils are equal, round, and reactive to light.       Cardiovascular:      Rate and Rhythm: Normal rate and regular rhythm.      Heart sounds: Normal heart sounds. No murmur heard.     No friction rub.   Pulmonary:      Effort: Pulmonary effort is normal. No respiratory distress.      Breath sounds: Normal breath sounds. No wheezing or rales.   Chest:      Chest wall: No tenderness.   Abdominal:      General: Bowel sounds are normal.      Palpations: Abdomen is soft.     Musculoskeletal:         General: Normal range of motion.      Cervical back: Normal range of motion and neck supple.     Skin:     General: Skin is warm and dry.      Capillary Refill: Capillary refill takes 2 to 3 seconds.     Neurological:      Mental Status: He is alert and oriented to person, place, and time.     Psychiatric:         Behavior: Behavior normal.         Thought Content: Thought content normal.         Judgment: Judgment normal.

## 2025-06-20 ENCOUNTER — APPOINTMENT (OUTPATIENT)
Dept: LAB | Facility: MEDICAL CENTER | Age: 60
End: 2025-06-20
Payer: COMMERCIAL

## 2025-06-20 DIAGNOSIS — G47.62 NOCTURNAL LEG CRAMPS: ICD-10-CM

## 2025-06-20 LAB
ALBUMIN SERPL BCG-MCNC: 4.2 G/DL (ref 3.5–5)
ALP SERPL-CCNC: 98 U/L (ref 34–104)
ALT SERPL W P-5'-P-CCNC: 22 U/L (ref 7–52)
ANION GAP SERPL CALCULATED.3IONS-SCNC: 9 MMOL/L (ref 4–13)
AST SERPL W P-5'-P-CCNC: 21 U/L (ref 13–39)
BILIRUB SERPL-MCNC: 0.7 MG/DL (ref 0.2–1)
BUN SERPL-MCNC: 17 MG/DL (ref 5–25)
CA-I BLD-SCNC: 1.18 MMOL/L (ref 1.12–1.32)
CALCIUM SERPL-MCNC: 9.3 MG/DL (ref 8.4–10.2)
CHLORIDE SERPL-SCNC: 104 MMOL/L (ref 96–108)
CHOLEST SERPL-MCNC: 210 MG/DL (ref ?–200)
CO2 SERPL-SCNC: 25 MMOL/L (ref 21–32)
CREAT SERPL-MCNC: 0.79 MG/DL (ref 0.6–1.3)
EST. AVERAGE GLUCOSE BLD GHB EST-MCNC: 128 MG/DL
GFR SERPL CREATININE-BSD FRML MDRD: 98 ML/MIN/1.73SQ M
GLUCOSE P FAST SERPL-MCNC: 118 MG/DL (ref 65–99)
HBA1C MFR BLD: 6.1 %
HDLC SERPL-MCNC: 56 MG/DL
LDLC SERPL CALC-MCNC: 138 MG/DL (ref 0–100)
MAGNESIUM SERPL-MCNC: 2.1 MG/DL (ref 1.9–2.7)
NONHDLC SERPL-MCNC: 154 MG/DL
PHOSPHATE SERPL-MCNC: 3.2 MG/DL (ref 2.7–4.5)
POTASSIUM SERPL-SCNC: 4.3 MMOL/L (ref 3.5–5.3)
PROT SERPL-MCNC: 7.2 G/DL (ref 6.4–8.4)
SODIUM SERPL-SCNC: 138 MMOL/L (ref 135–147)
TRIGL SERPL-MCNC: 82 MG/DL (ref ?–150)

## 2025-06-20 PROCEDURE — 82330 ASSAY OF CALCIUM: CPT

## 2025-06-20 PROCEDURE — 84100 ASSAY OF PHOSPHORUS: CPT

## 2025-06-20 PROCEDURE — 83735 ASSAY OF MAGNESIUM: CPT

## 2025-06-20 PROCEDURE — 80053 COMPREHEN METABOLIC PANEL: CPT | Performed by: FAMILY MEDICINE

## 2025-06-20 PROCEDURE — 83036 HEMOGLOBIN GLYCOSYLATED A1C: CPT | Performed by: FAMILY MEDICINE

## 2025-06-20 PROCEDURE — 80061 LIPID PANEL: CPT | Performed by: FAMILY MEDICINE

## 2025-06-20 PROCEDURE — 36415 COLL VENOUS BLD VENIPUNCTURE: CPT

## 2025-06-23 ENCOUNTER — TELEPHONE (OUTPATIENT)
Dept: FAMILY MEDICINE CLINIC | Facility: CLINIC | Age: 60
End: 2025-06-23

## 2025-06-23 ENCOUNTER — RESULTS FOLLOW-UP (OUTPATIENT)
Dept: FAMILY MEDICINE CLINIC | Facility: CLINIC | Age: 60
End: 2025-06-23

## 2025-06-23 ENCOUNTER — TELEPHONE (OUTPATIENT)
Dept: BARIATRICS | Facility: CLINIC | Age: 60
End: 2025-06-23

## 2025-06-23 NOTE — TELEPHONE ENCOUNTER
Pt said that he forgot to mention at his LOV that he is experiencing Right Heel numbness.  Not sure what you would recommend or if the testing that Dr ordered could explain this problem as well.    Looking for recommendations.

## 2025-06-24 DIAGNOSIS — G57.91 NEUROPATHY OF ANKLE, RIGHT: Primary | ICD-10-CM

## 2025-06-24 NOTE — TELEPHONE ENCOUNTER
It has been going on for about 4 weeks, so he will see if it is still bothering him and then call to schedule the EMG.

## 2025-06-30 ENCOUNTER — HOSPITAL ENCOUNTER (OUTPATIENT)
Dept: NON INVASIVE DIAGNOSTICS | Facility: HOSPITAL | Age: 60
Discharge: HOME | End: 2025-06-30
Attending: FAMILY MEDICINE
Payer: COMMERCIAL

## 2025-06-30 DIAGNOSIS — G47.62 NOCTURNAL LEG CRAMPS: ICD-10-CM

## 2025-06-30 DIAGNOSIS — I73.9 CLAUDICATION (HCC): ICD-10-CM

## 2025-06-30 PROCEDURE — 93923 UPR/LXTR ART STDY 3+ LVLS: CPT

## 2025-06-30 PROCEDURE — 93923 UPR/LXTR ART STDY 3+ LVLS: CPT | Performed by: SURGERY

## 2025-07-01 DIAGNOSIS — R73.09 ELEVATED GLUCOSE: ICD-10-CM

## 2025-07-01 DIAGNOSIS — J30.1 SEASONAL ALLERGIC RHINITIS DUE TO POLLEN: ICD-10-CM

## 2025-07-01 RX ORDER — AZELASTINE 1 MG/ML
1 SPRAY, METERED NASAL 2 TIMES DAILY
Qty: 30 ML | Refills: 3 | Status: SHIPPED | OUTPATIENT
Start: 2025-07-01

## 2025-07-01 RX ORDER — METFORMIN HYDROCHLORIDE 500 MG/1
500 TABLET, EXTENDED RELEASE ORAL
Qty: 90 TABLET | Refills: 1 | Status: SHIPPED | OUTPATIENT
Start: 2025-07-01

## 2025-07-14 ENCOUNTER — OFFICE VISIT (OUTPATIENT)
Age: 60
End: 2025-07-14
Payer: COMMERCIAL

## 2025-07-14 VITALS
OXYGEN SATURATION: 96 % | BODY MASS INDEX: 38.11 KG/M2 | WEIGHT: 266.2 LBS | HEART RATE: 83 BPM | DIASTOLIC BLOOD PRESSURE: 84 MMHG | TEMPERATURE: 97.1 F | SYSTOLIC BLOOD PRESSURE: 122 MMHG | HEIGHT: 70 IN

## 2025-07-14 DIAGNOSIS — R73.03 PREDIABETES: ICD-10-CM

## 2025-07-14 DIAGNOSIS — R73.09 ELEVATED GLUCOSE: ICD-10-CM

## 2025-07-14 DIAGNOSIS — E66.812 OBESITY, CLASS II, BMI 35-39.9: Primary | ICD-10-CM

## 2025-07-14 PROCEDURE — 99204 OFFICE O/P NEW MOD 45 MIN: CPT | Performed by: PHYSICIAN ASSISTANT

## 2025-07-14 RX ORDER — METFORMIN HYDROCHLORIDE 750 MG/1
750 TABLET, EXTENDED RELEASE ORAL
Qty: 90 TABLET | Refills: 0 | Status: SHIPPED | OUTPATIENT
Start: 2025-07-14

## 2025-07-14 RX ORDER — GABAPENTIN 300 MG/1
300 CAPSULE ORAL
COMMUNITY
Start: 2025-07-09

## 2025-07-14 NOTE — ASSESSMENT & PLAN NOTE
-Discussed options of [facility]-Intensive Lifestyle Intervention Program, Very Low Calorie Diet-VLCD, Conservative Program, Alexandr-En-Y Gastric Bypass, and Vertical Sleeve Gastrectomy and the role of weight loss medications.  -Initial weight loss goal of 5-10% weight loss for improved health  -Trialed topiramate for migraines- did not see improvement in headaches   -Screening labs: CMP, A1c, LP reviewed from 6/20/25  -Patient is interested in pursuing Conservative Program  -on metformin by PCP and responding well; increase dose  -injectables not covered and prefers to avoid injections  -dietary recs provided  -not interested in RD visit     +STOP BANG (4/8):  -reviewed risks of undiagnosed/untreated sleep apnea.  -Recommended referral to sleep medicine provider for further evaluation.  -Patient declined and would like to see if risks improve with weight loss

## 2025-07-14 NOTE — PROGRESS NOTES
Assessment/Plan:    Obesity, Class II, BMI 35-39.9  -Discussed options of [facility]-Intensive Lifestyle Intervention Program, Very Low Calorie Diet-VLCD, Conservative Program, Alexandr-En-Y Gastric Bypass, and Vertical Sleeve Gastrectomy and the role of weight loss medications.  -Initial weight loss goal of 5-10% weight loss for improved health  -Trialed topiramate for migraines- did not see improvement in headaches   -Screening labs: CMP, A1c, LP reviewed from 6/20/25  -Patient is interested in pursuing Conservative Program  -on metformin by PCP and responding well; increase dose  -injectables not covered and prefers to avoid injections  -dietary recs provided  -not interested in RD visit     +STOP BANG (4/8):  -reviewed risks of undiagnosed/untreated sleep apnea.  -Recommended referral to sleep medicine provider for further evaluation.  -Patient declined and would like to see if risks improve with weight loss    Follow up in approximately 6 months with Non-Surgical Physician/Advanced Practitioner.    Goals:  Food log (ie.) www.[employer].com,sparkpeople.com,loseit.com,calorieking.com,etc. baritastic  No sugary beverages. At least 64oz of water daily.  Increase physical activity by 10 minutes daily. Gradually increase physical activity to a goal of 5 days per week for 30 minutes of MODERATE intensity PLUS 2 days per week of FULL BODY resistance training  5-10 servings of fruits and vegetables per day and 25-35 grams of dietary fiber per day, gradually increasing    Diagnoses and all orders for this visit:    Obesity, Class II, BMI 35-39.9    Elevated glucose    Prediabetes  -     metFORMIN (GLUCOPHAGE-XR) 750 mg 24 hr tablet; Take 1 tablet (750 mg total) by mouth daily with dinner    Body mass index 38.0-38.9, adult      Subjective:   Chief Complaint   Patient presents with    Consult     NEWP to be established for weight management. Patient needs knee surgery but needs to lose weight first.     Patient ID: Ancelmo  YANNA Rosales Jr.  is a 59 y.o. male with excess weight/obesity here to pursue weight management.  Past Medical History[1]  HPI:  Obesity/Excess Weight:  Severity: Severe  Onset:  many years; has had many surgeries and had limited activity; now having knee and hip issues which now limits activity even further     Modifiers: Diet and Exercise  Recently lost 18 lbs - since start of metformin- + effects on appetite and cravings  Contributing factors: Poor Food Choices, Insufficient Physical Activity, and 20+ surgeries   Associated symptoms: comorbid conditions, increased joint pain, and inability to do certain activities    Goals: 245  Hydration: 5-6 bottles of water  Coffee with milk   Alcohol: 2-3 beers 1-2x/week  Exercise:  limited due to knee and back pain; >1/2 days of week gets 10k steps per day   Dining out:  4x/week   Occupation: retired- worked in correction, 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    Review of Systems   Constitutional:  Negative for chills and fever.   HENT:  Negative for sore throat.    Respiratory:  Negative for cough and shortness of breath.    Cardiovascular:  Negative for chest pain and palpitations.   Gastrointestinal:  Negative for constipation and diarrhea.   Genitourinary:  Negative for dysuria.   Musculoskeletal:  Positive for back pain.   Skin:  Negative for rash.   Neurological:  Positive for headaches (behind eyes- recommend seeing ophthalmology; currently managed by neurology).   Psychiatric/Behavioral: Negative.       Objective:    /84   Pulse 83    "Temp (!) 97.1 °F (36.2 °C)   Ht 5' 10\" (1.778 m)   Wt 121 kg (266 lb 3.2 oz)   SpO2 96%   BMI 38.20 kg/m²     Physical Exam  Vitals and nursing note reviewed.     Constitutional   General appearance: Abnormal.  well developed and obese.   Pulmonary   Respiratory effort: No increased work of breathing or signs of respiratory distress.     Abdomen   Abdomen: Abnormal.  The abdomen was obese.   Musculoskeletal   Gait and station: Normal.    Psychiatric   Orientation to person, place and time: Normal.    Affect: appropriate    45 minute visit, >50% time spent counseling patient on surgical and nonsurgical interventions for the treatment of excess weight.  Discussed the advantages and long-term outcomes with regards to bariatric surgery.  Discussed in detail nonsurgical options including intensive lifestyle intervention program, very low-calorie diet program and conservative program.  Discussed the role of weight loss medications.  Counseled patient on diet behavior and exercise modification for weight loss.           [1]   Past Medical History:  Diagnosis Date    Bee allergy status     Last Assessed 8/25/2016    Benign lipomatous neoplasm     Last Assessed 7/7/2017    Cellulitis of mid back region     Last Assessed 11/13/2016    High risk medication use     Last Assessed 12/21/2015    Internal derangement of knee, left     Last Assessed 2/28/2017    Pelvic cramping     Last Assessed 8/25/2016    Prostatism     Last Assessed 8/25/2016    Sebaceous cyst     Last Assessed 7/01/2017     "

## 2025-08-19 ENCOUNTER — VBI (OUTPATIENT)
Dept: ADMINISTRATIVE | Facility: OTHER | Age: 60
End: 2025-08-19